# Patient Record
Sex: FEMALE | Race: WHITE | Employment: OTHER | URBAN - METROPOLITAN AREA
[De-identification: names, ages, dates, MRNs, and addresses within clinical notes are randomized per-mention and may not be internally consistent; named-entity substitution may affect disease eponyms.]

---

## 2019-04-17 ENCOUNTER — APPOINTMENT (OUTPATIENT)
Dept: GENERAL RADIOLOGY | Facility: CLINIC | Age: 84
DRG: 981 | End: 2019-04-17
Attending: EMERGENCY MEDICINE
Payer: MEDICARE

## 2019-04-17 ENCOUNTER — APPOINTMENT (OUTPATIENT)
Dept: CT IMAGING | Facility: CLINIC | Age: 84
DRG: 981 | End: 2019-04-17
Attending: EMERGENCY MEDICINE
Payer: MEDICARE

## 2019-04-17 ENCOUNTER — APPOINTMENT (OUTPATIENT)
Dept: GENERAL RADIOLOGY | Facility: CLINIC | Age: 84
DRG: 981 | End: 2019-04-17
Attending: PHYSICIAN ASSISTANT
Payer: MEDICARE

## 2019-04-17 ENCOUNTER — HOSPITAL ENCOUNTER (INPATIENT)
Facility: CLINIC | Age: 84
LOS: 13 days | Discharge: HOSPICE | DRG: 981 | End: 2019-04-30
Attending: EMERGENCY MEDICINE | Admitting: INTERNAL MEDICINE
Payer: MEDICARE

## 2019-04-17 ENCOUNTER — DOCUMENTATION ONLY (OUTPATIENT)
Dept: OTHER | Facility: CLINIC | Age: 84
End: 2019-04-17

## 2019-04-17 ENCOUNTER — APPOINTMENT (OUTPATIENT)
Dept: CARDIOLOGY | Facility: CLINIC | Age: 84
DRG: 981 | End: 2019-04-17
Attending: INTERNAL MEDICINE
Payer: MEDICARE

## 2019-04-17 ENCOUNTER — NURSE TRIAGE (OUTPATIENT)
Dept: NURSING | Facility: CLINIC | Age: 84
End: 2019-04-17

## 2019-04-17 DIAGNOSIS — I63.9 CEREBROVASCULAR ACCIDENT (CVA), UNSPECIFIED MECHANISM (H): ICD-10-CM

## 2019-04-17 DIAGNOSIS — I63.9 ACUTE CVA (CEREBROVASCULAR ACCIDENT) (H): Primary | ICD-10-CM

## 2019-04-17 LAB
ABO + RH BLD: NORMAL
ABO + RH BLD: NORMAL
ALBUMIN SERPL-MCNC: 2.5 G/DL (ref 3.4–5)
ALBUMIN SERPL-MCNC: 3 G/DL (ref 3.4–5)
ALBUMIN UR-MCNC: 30 MG/DL
ALP SERPL-CCNC: 122 U/L (ref 40–150)
ALP SERPL-CCNC: 128 U/L (ref 40–150)
ALT SERPL W P-5'-P-CCNC: 29 U/L (ref 0–50)
ALT SERPL W P-5'-P-CCNC: 30 U/L (ref 0–50)
ANION GAP SERPL CALCULATED.3IONS-SCNC: 11 MMOL/L (ref 3–14)
APPEARANCE UR: CLEAR
APTT PPP: 35 SEC (ref 22–37)
AST SERPL W P-5'-P-CCNC: 23 U/L (ref 0–45)
AST SERPL W P-5'-P-CCNC: 26 U/L (ref 0–45)
BASOPHILS # BLD AUTO: 0 10E9/L (ref 0–0.2)
BASOPHILS NFR BLD AUTO: 0.1 %
BILIRUB DIRECT SERPL-MCNC: 0.3 MG/DL (ref 0–0.2)
BILIRUB DIRECT SERPL-MCNC: 0.4 MG/DL (ref 0–0.2)
BILIRUB SERPL-MCNC: 0.8 MG/DL (ref 0.2–1.3)
BILIRUB SERPL-MCNC: 0.8 MG/DL (ref 0.2–1.3)
BILIRUB UR QL STRIP: NEGATIVE
BLD GP AB SCN SERPL QL: NORMAL
BLOOD BANK CMNT PATIENT-IMP: NORMAL
BUN SERPL-MCNC: 25 MG/DL (ref 7–30)
CALCIUM SERPL-MCNC: 9.7 MG/DL (ref 8.5–10.1)
CHLORIDE SERPL-SCNC: 96 MMOL/L (ref 94–109)
CHOLEST SERPL-MCNC: 126 MG/DL
CO2 SERPL-SCNC: 22 MMOL/L (ref 20–32)
COLOR UR AUTO: YELLOW
CREAT SERPL-MCNC: 0.84 MG/DL (ref 0.52–1.04)
CRP SERPL-MCNC: 231 MG/L (ref 0–8)
DIFFERENTIAL METHOD BLD: ABNORMAL
EOSINOPHIL # BLD AUTO: 0 10E9/L (ref 0–0.7)
EOSINOPHIL NFR BLD AUTO: 0 %
ERYTHROCYTE [DISTWIDTH] IN BLOOD BY AUTOMATED COUNT: 14.1 % (ref 10–15)
FLUAV+FLUBV AG SPEC QL: NEGATIVE
FLUAV+FLUBV AG SPEC QL: NEGATIVE
GFR SERPL CREATININE-BSD FRML MDRD: 61 ML/MIN/{1.73_M2}
GLUCOSE BLDC GLUCOMTR-MCNC: 127 MG/DL (ref 70–99)
GLUCOSE SERPL-MCNC: 136 MG/DL (ref 70–99)
GLUCOSE UR STRIP-MCNC: NEGATIVE MG/DL
HBA1C MFR BLD: 6.1 % (ref 0–5.6)
HCT VFR BLD AUTO: 31.6 % (ref 35–47)
HDLC SERPL-MCNC: 22 MG/DL
HGB BLD-MCNC: 11.4 G/DL (ref 11.7–15.7)
HGB UR QL STRIP: ABNORMAL
IMM GRANULOCYTES # BLD: 0 10E9/L (ref 0–0.4)
IMM GRANULOCYTES NFR BLD: 0.3 %
INR PPP: 1.2 (ref 0.86–1.14)
INTERPRETATION ECG - MUSE: NORMAL
KETONES UR STRIP-MCNC: NEGATIVE MG/DL
LACTATE BLD-SCNC: 1.1 MMOL/L (ref 0.7–2)
LACTATE BLD-SCNC: 1.3 MMOL/L (ref 0.7–2)
LDLC SERPL CALC-MCNC: 81 MG/DL
LEUKOCYTE ESTERASE UR QL STRIP: NEGATIVE
LIPASE SERPL-CCNC: 77 U/L (ref 73–393)
LYMPHOCYTES # BLD AUTO: 0.3 10E9/L (ref 0.8–5.3)
LYMPHOCYTES NFR BLD AUTO: 1.8 %
MCH RBC QN AUTO: 31.2 PG (ref 26.5–33)
MCHC RBC AUTO-ENTMCNC: 36.1 G/DL (ref 31.5–36.5)
MCV RBC AUTO: 87 FL (ref 78–100)
MONOCYTES # BLD AUTO: 0.5 10E9/L (ref 0–1.3)
MONOCYTES NFR BLD AUTO: 3.1 %
NEUTROPHILS # BLD AUTO: 13.9 10E9/L (ref 1.6–8.3)
NEUTROPHILS NFR BLD AUTO: 94.7 %
NITRATE UR QL: NEGATIVE
NONHDLC SERPL-MCNC: 104 MG/DL
NRBC # BLD AUTO: 0 10*3/UL
NRBC BLD AUTO-RTO: 0 /100
PH UR STRIP: 6 PH (ref 5–7)
PLATELET # BLD AUTO: 199 10E9/L (ref 150–450)
POTASSIUM SERPL-SCNC: 3.9 MMOL/L (ref 3.4–5.3)
PROCALCITONIN SERPL-MCNC: 0.61 NG/ML
PROT SERPL-MCNC: 6.4 G/DL (ref 6.8–8.8)
PROT SERPL-MCNC: 7.4 G/DL (ref 6.8–8.8)
RADIOLOGIST FLAGS: ABNORMAL
RBC # BLD AUTO: 3.65 10E12/L (ref 3.8–5.2)
RBC #/AREA URNS AUTO: 1 /HPF (ref 0–2)
SODIUM SERPL-SCNC: 129 MMOL/L (ref 133–144)
SOURCE: ABNORMAL
SP GR UR STRIP: 1.02 (ref 1–1.03)
SPECIMEN EXP DATE BLD: NORMAL
SPECIMEN SOURCE: NORMAL
TRIGL SERPL-MCNC: 117 MG/DL
UROBILINOGEN UR STRIP-MCNC: NORMAL MG/DL (ref 0–2)
WBC # BLD AUTO: 14.7 10E9/L (ref 4–11)
WBC #/AREA URNS AUTO: 1 /HPF (ref 0–5)

## 2019-04-17 PROCEDURE — 85610 PROTHROMBIN TIME: CPT | Performed by: EMERGENCY MEDICINE

## 2019-04-17 PROCEDURE — 86140 C-REACTIVE PROTEIN: CPT | Performed by: INTERNAL MEDICINE

## 2019-04-17 PROCEDURE — 85025 COMPLETE CBC W/AUTO DIFF WBC: CPT | Performed by: EMERGENCY MEDICINE

## 2019-04-17 PROCEDURE — 81001 URINALYSIS AUTO W/SCOPE: CPT | Performed by: EMERGENCY MEDICINE

## 2019-04-17 PROCEDURE — 71046 X-RAY EXAM CHEST 2 VIEWS: CPT

## 2019-04-17 PROCEDURE — 93306 TTE W/DOPPLER COMPLETE: CPT | Mod: 26 | Performed by: INTERNAL MEDICINE

## 2019-04-17 PROCEDURE — 40000264 ECHOCARDIOGRAM COMPLETE

## 2019-04-17 PROCEDURE — 87800 DETECT AGNT MULT DNA DIREC: CPT | Performed by: EMERGENCY MEDICINE

## 2019-04-17 PROCEDURE — 00000146 ZZHCL STATISTIC GLUCOSE BY METER IP

## 2019-04-17 PROCEDURE — 36415 COLL VENOUS BLD VENIPUNCTURE: CPT

## 2019-04-17 PROCEDURE — 87040 BLOOD CULTURE FOR BACTERIA: CPT | Performed by: EMERGENCY MEDICINE

## 2019-04-17 PROCEDURE — 25000132 ZZH RX MED GY IP 250 OP 250 PS 637: Performed by: EMERGENCY MEDICINE

## 2019-04-17 PROCEDURE — 25800030 ZZH RX IP 258 OP 636: Performed by: PHYSICIAN ASSISTANT

## 2019-04-17 PROCEDURE — 80076 HEPATIC FUNCTION PANEL: CPT | Performed by: EMERGENCY MEDICINE

## 2019-04-17 PROCEDURE — 70450 CT HEAD/BRAIN W/O DYE: CPT

## 2019-04-17 PROCEDURE — 80048 BASIC METABOLIC PNL TOTAL CA: CPT | Performed by: EMERGENCY MEDICINE

## 2019-04-17 PROCEDURE — 87077 CULTURE AEROBIC IDENTIFY: CPT | Performed by: PHYSICIAN ASSISTANT

## 2019-04-17 PROCEDURE — 80061 LIPID PANEL: CPT | Performed by: INTERNAL MEDICINE

## 2019-04-17 PROCEDURE — 12000000 ZZH R&B MED SURG/OB

## 2019-04-17 PROCEDURE — 86900 BLOOD TYPING SEROLOGIC ABO: CPT | Performed by: EMERGENCY MEDICINE

## 2019-04-17 PROCEDURE — 96360 HYDRATION IV INFUSION INIT: CPT | Mod: 59

## 2019-04-17 PROCEDURE — 70498 CT ANGIOGRAPHY NECK: CPT

## 2019-04-17 PROCEDURE — 36415 COLL VENOUS BLD VENIPUNCTURE: CPT | Performed by: PHYSICIAN ASSISTANT

## 2019-04-17 PROCEDURE — 99285 EMERGENCY DEPT VISIT HI MDM: CPT | Mod: 25

## 2019-04-17 PROCEDURE — 87040 BLOOD CULTURE FOR BACTERIA: CPT | Performed by: PHYSICIAN ASSISTANT

## 2019-04-17 PROCEDURE — 87804 INFLUENZA ASSAY W/OPTIC: CPT | Performed by: EMERGENCY MEDICINE

## 2019-04-17 PROCEDURE — 86850 RBC ANTIBODY SCREEN: CPT | Performed by: EMERGENCY MEDICINE

## 2019-04-17 PROCEDURE — 87186 SC STD MICRODIL/AGAR DIL: CPT | Performed by: EMERGENCY MEDICINE

## 2019-04-17 PROCEDURE — 84145 PROCALCITONIN (PCT): CPT | Performed by: INTERNAL MEDICINE

## 2019-04-17 PROCEDURE — 25000128 H RX IP 250 OP 636: Performed by: EMERGENCY MEDICINE

## 2019-04-17 PROCEDURE — 25800030 ZZH RX IP 258 OP 636: Performed by: INTERNAL MEDICINE

## 2019-04-17 PROCEDURE — 73502 X-RAY EXAM HIP UNI 2-3 VIEWS: CPT

## 2019-04-17 PROCEDURE — 74176 CT ABD & PELVIS W/O CONTRAST: CPT

## 2019-04-17 PROCEDURE — 99223 1ST HOSP IP/OBS HIGH 75: CPT | Mod: GC | Performed by: PSYCHIATRY & NEUROLOGY

## 2019-04-17 PROCEDURE — 25000132 ZZH RX MED GY IP 250 OP 250 PS 637: Performed by: PHYSICIAN ASSISTANT

## 2019-04-17 PROCEDURE — 83605 ASSAY OF LACTIC ACID: CPT | Performed by: EMERGENCY MEDICINE

## 2019-04-17 PROCEDURE — 83036 HEMOGLOBIN GLYCOSYLATED A1C: CPT | Performed by: INTERNAL MEDICINE

## 2019-04-17 PROCEDURE — 25000125 ZZHC RX 250: Performed by: EMERGENCY MEDICINE

## 2019-04-17 PROCEDURE — 80076 HEPATIC FUNCTION PANEL: CPT | Performed by: INTERNAL MEDICINE

## 2019-04-17 PROCEDURE — 93005 ELECTROCARDIOGRAM TRACING: CPT

## 2019-04-17 PROCEDURE — 99223 1ST HOSP IP/OBS HIGH 75: CPT | Mod: AI | Performed by: INTERNAL MEDICINE

## 2019-04-17 PROCEDURE — 25000128 H RX IP 250 OP 636: Performed by: PHYSICIAN ASSISTANT

## 2019-04-17 PROCEDURE — A9270 NON-COVERED ITEM OR SERVICE: HCPCS | Performed by: EMERGENCY MEDICINE

## 2019-04-17 PROCEDURE — 83605 ASSAY OF LACTIC ACID: CPT | Performed by: INTERNAL MEDICINE

## 2019-04-17 PROCEDURE — A9270 NON-COVERED ITEM OR SERVICE: HCPCS | Performed by: PHYSICIAN ASSISTANT

## 2019-04-17 PROCEDURE — 96361 HYDRATE IV INFUSION ADD-ON: CPT

## 2019-04-17 PROCEDURE — 36415 COLL VENOUS BLD VENIPUNCTURE: CPT | Performed by: INTERNAL MEDICINE

## 2019-04-17 PROCEDURE — 25000128 H RX IP 250 OP 636: Performed by: INTERNAL MEDICINE

## 2019-04-17 PROCEDURE — 83690 ASSAY OF LIPASE: CPT | Performed by: EMERGENCY MEDICINE

## 2019-04-17 PROCEDURE — 86901 BLOOD TYPING SEROLOGIC RH(D): CPT | Performed by: EMERGENCY MEDICINE

## 2019-04-17 PROCEDURE — 73562 X-RAY EXAM OF KNEE 3: CPT | Mod: RT

## 2019-04-17 PROCEDURE — 87077 CULTURE AEROBIC IDENTIFY: CPT | Performed by: EMERGENCY MEDICINE

## 2019-04-17 PROCEDURE — 85730 THROMBOPLASTIN TIME PARTIAL: CPT | Performed by: EMERGENCY MEDICINE

## 2019-04-17 RX ORDER — NALOXONE HYDROCHLORIDE 0.4 MG/ML
.1-.4 INJECTION, SOLUTION INTRAMUSCULAR; INTRAVENOUS; SUBCUTANEOUS
Status: DISCONTINUED | OUTPATIENT
Start: 2019-04-17 | End: 2019-04-18

## 2019-04-17 RX ORDER — ATORVASTATIN CALCIUM 40 MG/1
40 TABLET, FILM COATED ORAL
Status: DISCONTINUED | OUTPATIENT
Start: 2019-04-17 | End: 2019-04-19

## 2019-04-17 RX ORDER — ACETAMINOPHEN 650 MG/1
650 SUPPOSITORY RECTAL EVERY 4 HOURS PRN
Status: DISCONTINUED | OUTPATIENT
Start: 2019-04-17 | End: 2019-04-25

## 2019-04-17 RX ORDER — LORAZEPAM 2 MG/ML
.5-1 INJECTION INTRAMUSCULAR ONCE
Status: COMPLETED | OUTPATIENT
Start: 2019-04-18 | End: 2019-04-18

## 2019-04-17 RX ORDER — LORAZEPAM 2 MG/ML
0.5 INJECTION INTRAMUSCULAR
Status: DISCONTINUED | OUTPATIENT
Start: 2019-04-17 | End: 2019-04-24

## 2019-04-17 RX ORDER — ONDANSETRON 2 MG/ML
4 INJECTION INTRAMUSCULAR; INTRAVENOUS EVERY 6 HOURS PRN
Status: DISCONTINUED | OUTPATIENT
Start: 2019-04-17 | End: 2019-04-25

## 2019-04-17 RX ORDER — POTASSIUM CHLORIDE 29.8 MG/ML
20 INJECTION INTRAVENOUS
Status: DISCONTINUED | OUTPATIENT
Start: 2019-04-17 | End: 2019-04-18

## 2019-04-17 RX ORDER — HYDROCHLOROTHIAZIDE 12.5 MG/1
12.5 TABLET ORAL DAILY
Status: ON HOLD | COMMUNITY
End: 2019-04-29

## 2019-04-17 RX ORDER — AMLODIPINE BESYLATE 5 MG/1
5 TABLET ORAL DAILY
Status: ON HOLD | COMMUNITY
End: 2019-04-29

## 2019-04-17 RX ORDER — POTASSIUM CHLORIDE 1500 MG/1
20-40 TABLET, EXTENDED RELEASE ORAL
Status: DISCONTINUED | OUTPATIENT
Start: 2019-04-17 | End: 2019-04-18

## 2019-04-17 RX ORDER — POTASSIUM CHLORIDE 7.45 MG/ML
10 INJECTION INTRAVENOUS
Status: DISCONTINUED | OUTPATIENT
Start: 2019-04-17 | End: 2019-04-18

## 2019-04-17 RX ORDER — POTASSIUM CHLORIDE 1.5 G/1.58G
20-40 POWDER, FOR SOLUTION ORAL
Status: DISCONTINUED | OUTPATIENT
Start: 2019-04-17 | End: 2019-04-18

## 2019-04-17 RX ORDER — HYDROMORPHONE HYDROCHLORIDE 1 MG/ML
0.2 INJECTION, SOLUTION INTRAMUSCULAR; INTRAVENOUS; SUBCUTANEOUS
Status: DISCONTINUED | OUTPATIENT
Start: 2019-04-17 | End: 2019-04-18

## 2019-04-17 RX ORDER — LORAZEPAM 2 MG/ML
.5-1 INJECTION INTRAMUSCULAR ONCE
Status: DISCONTINUED | OUTPATIENT
Start: 2019-04-17 | End: 2019-04-17

## 2019-04-17 RX ORDER — IOPAMIDOL 755 MG/ML
120 INJECTION, SOLUTION INTRAVASCULAR ONCE
Status: COMPLETED | OUTPATIENT
Start: 2019-04-17 | End: 2019-04-17

## 2019-04-17 RX ORDER — ASPIRIN 300 MG/1
300 SUPPOSITORY RECTAL ONCE
Status: COMPLETED | OUTPATIENT
Start: 2019-04-17 | End: 2019-04-17

## 2019-04-17 RX ORDER — ONDANSETRON 4 MG/1
4 TABLET, ORALLY DISINTEGRATING ORAL EVERY 6 HOURS PRN
Status: DISCONTINUED | OUTPATIENT
Start: 2019-04-17 | End: 2019-04-25

## 2019-04-17 RX ORDER — PIPERACILLIN SODIUM, TAZOBACTAM SODIUM 3; .375 G/15ML; G/15ML
3.38 INJECTION, POWDER, LYOPHILIZED, FOR SOLUTION INTRAVENOUS EVERY 6 HOURS
Status: DISCONTINUED | OUTPATIENT
Start: 2019-04-17 | End: 2019-04-17

## 2019-04-17 RX ORDER — POTASSIUM CL/LIDO/0.9 % NACL 10MEQ/0.1L
10 INTRAVENOUS SOLUTION, PIGGYBACK (ML) INTRAVENOUS
Status: DISCONTINUED | OUTPATIENT
Start: 2019-04-17 | End: 2019-04-18

## 2019-04-17 RX ORDER — ATORVASTATIN CALCIUM 10 MG/1
10 TABLET, FILM COATED ORAL DAILY
Status: ON HOLD | COMMUNITY
End: 2019-04-29

## 2019-04-17 RX ORDER — LABETALOL 20 MG/4 ML (5 MG/ML) INTRAVENOUS SYRINGE
10-40 EVERY 10 MIN PRN
Status: DISCONTINUED | OUTPATIENT
Start: 2019-04-17 | End: 2019-04-30 | Stop reason: HOSPADM

## 2019-04-17 RX ORDER — ASPIRIN 81 MG/1
81 TABLET ORAL DAILY
Status: ON HOLD | COMMUNITY
End: 2019-04-29

## 2019-04-17 RX ORDER — ACETAMINOPHEN 325 MG/1
650 TABLET ORAL EVERY 4 HOURS PRN
Status: DISCONTINUED | OUTPATIENT
Start: 2019-04-17 | End: 2019-04-18

## 2019-04-17 RX ORDER — AMOXICILLIN 250 MG
1 CAPSULE ORAL 2 TIMES DAILY PRN
Status: DISCONTINUED | OUTPATIENT
Start: 2019-04-17 | End: 2019-04-30 | Stop reason: HOSPADM

## 2019-04-17 RX ORDER — LOSARTAN POTASSIUM 100 MG/1
100 TABLET ORAL DAILY
Status: ON HOLD | COMMUNITY
End: 2019-04-29

## 2019-04-17 RX ORDER — AMOXICILLIN 250 MG
2 CAPSULE ORAL 2 TIMES DAILY PRN
Status: DISCONTINUED | OUTPATIENT
Start: 2019-04-17 | End: 2019-04-30 | Stop reason: HOSPADM

## 2019-04-17 RX ORDER — ASPIRIN 300 MG/1
300 SUPPOSITORY RECTAL DAILY
Status: DISCONTINUED | OUTPATIENT
Start: 2019-04-18 | End: 2019-04-25

## 2019-04-17 RX ORDER — SODIUM CHLORIDE 9 MG/ML
INJECTION, SOLUTION INTRAVENOUS CONTINUOUS
Status: DISCONTINUED | OUTPATIENT
Start: 2019-04-17 | End: 2019-04-20

## 2019-04-17 RX ORDER — CEFTRIAXONE 1 G/1
1 INJECTION, POWDER, FOR SOLUTION INTRAMUSCULAR; INTRAVENOUS EVERY 24 HOURS
Status: DISCONTINUED | OUTPATIENT
Start: 2019-04-17 | End: 2019-04-18

## 2019-04-17 RX ADMIN — HYDROMORPHONE HYDROCHLORIDE 0.2 MG: 1 INJECTION, SOLUTION INTRAMUSCULAR; INTRAVENOUS; SUBCUTANEOUS at 20:37

## 2019-04-17 RX ADMIN — VANCOMYCIN HYDROCHLORIDE 1500 MG: 5 INJECTION, POWDER, LYOPHILIZED, FOR SOLUTION INTRAVENOUS at 23:27

## 2019-04-17 RX ADMIN — SODIUM CHLORIDE: 9 INJECTION, SOLUTION INTRAVENOUS at 23:27

## 2019-04-17 RX ADMIN — SODIUM CHLORIDE 100 ML: 9 INJECTION, SOLUTION INTRAVENOUS at 08:38

## 2019-04-17 RX ADMIN — HYDROMORPHONE HYDROCHLORIDE 0.2 MG: 1 INJECTION, SOLUTION INTRAMUSCULAR; INTRAVENOUS; SUBCUTANEOUS at 23:35

## 2019-04-17 RX ADMIN — SODIUM CHLORIDE: 9 INJECTION, SOLUTION INTRAVENOUS at 14:17

## 2019-04-17 RX ADMIN — SODIUM CHLORIDE 1000 ML: 9 INJECTION, SOLUTION INTRAVENOUS at 09:33

## 2019-04-17 RX ADMIN — ACETAMINOPHEN 650 MG: 650 SUPPOSITORY RECTAL at 19:14

## 2019-04-17 RX ADMIN — ASPIRIN 300 MG: 300 SUPPOSITORY RECTAL at 09:30

## 2019-04-17 RX ADMIN — IOPAMIDOL 70 ML: 755 INJECTION, SOLUTION INTRAVENOUS at 08:38

## 2019-04-17 ASSESSMENT — ACTIVITIES OF DAILY LIVING (ADL)
ADLS_ACUITY_SCORE: 16
ADLS_ACUITY_SCORE: 16

## 2019-04-17 ASSESSMENT — MIFFLIN-ST. JEOR: SCORE: 1199.5

## 2019-04-17 NOTE — H&P
Admitted:     04/17/2019      PRIMARY CARE PROVIDER:  Out of town.      CHIEF COMPLAINT:  Change in mental state, aphasia.      HISTORY OF PRESENT ILLNESS:  Ms. is a 89-year-old female with a known history of hypertension, TIA, hyperlipidemia, and recurrent UTIs, who was brought to the Emergency Room by her daughter with a change in mental state and aphasia.  Reportedly, per the daughter, the patient woke up around 3:00 a.m. today and appeared to be confused, was having word finding difficulty and also difficulty following commands.  It appeared like she was aphasic and had a jumbled response to speech.  She also appeared a little confused and disoriented.  Reportedly, she was unable to see well on the left side.  The patient apparently slept after that on a recliner and when she woke up this morning, she was still having ongoing problems.  The daughter drove her to the Emergency Room.  At the time of my evaluation, the patient's speech and confusion appear to be much improved, and she is participating somewhat better in the history taking.  The patient denies any chest pain, dyspnea, abdominal pain, nausea, or vomiting.  She denies dysuria, urinary urgency or frequency.  Reportedly, the patient had fever up to 100 degrees Fahrenheit yesterday and subsequently 101 degrees today.  She also recently had influenza A about a month ago when she was in [place] and was treated.  She has had a lingering cough since then.      The patient also has been having an ongoing problem with her left hip.  She has had left hip arthroplasty there with some ongoing pain, but the pain has gotten worse in the past 3 days.  She had a right knee steroid injection performed by [facility] yesterday for ongoing pain of her right knee.  Reportedly, she was also evaluated for her left hip pain at that time and had an x-ray of the pelvis with left hip done, which was reported as lucencies seen along the medial aspect of the femoral  components proximally, concerning for osteolysis.  Given the findings on the x-ray, she apparently was referred for outpatient evaluation with an orthopedic surgeon for the left hip.      In the Emergency Room, the patient was evaluated by Dr. Sierra Addison.  A code stroke was called.  A CT head revealed a 2-3 cm area of hypoattenuation in gray white differentiation loss involving the mid right postcentral gyrus, extending into the right parietal white matter.  This is suspicious for an area of acute/subacute stroke ischemia.  The patient is admitted with concerns for acute stroke.  She was out of the window for TPA as she woke up with the symptoms.      PAST MEDICAL HISTORY:   1.  Transient ischemic attack.   2.  Hypertension.   3.  Hyperlipidemia.   4.  Hyponatremia.   5.  Benign essential hypertension.   6.  Recurrent UTIs.      PAST SURGICAL HISTORY:  She has had bilateral hip replacement done.      SOCIAL AND PERSONAL HISTORY:  She lives in [place] during the summer months and in [place] during the winter months.  She is visiting her mom at the [facility] at the moment.  Denies tobacco use, drinks alcohol socially.  No recreational drug use.      FAMILY HISTORY:  Reviewed and is noncontributory.      HOME MEDICATIONS:     Medications Prior to Admission   Medication Sig Dispense Refill Last Dose     amLODIPine (NORVASC) 5 MG tablet Take 5 mg by mouth daily   4/14/2019 at am     aspirin 81 MG EC tablet Take 81 mg by mouth daily   4/14/2019 at am     atorvastatin (LIPITOR) 10 MG tablet Take 10 mg by mouth daily   4/14/2019 at am     hydrochlorothiazide (HYDRODIURIL) 12.5 MG tablet Take 12.5 mg by mouth daily   4/14/2019 at am     losartan (COZAAR) 100 MG tablet Take 100 mg by mouth daily   4/14/2019 at am        REVIEW OF SYSTEMS:  A complete review of system was done and was negative for anything else other than that mentioned in the HPI.      PHYSICAL EXAMINATION:   GENERAL:  Ms. Belgica Klein is a 89-year-old  female.  She is not in any overt distress.  She does appear to be slightly anxious.   VITAL SIGNS:  Temperature 98.8, blood pressure 148/83, heart rate is 114, respiration rate is 13, O2 sat 94% on room air.   HEENT:  Atraumatic, normocephalic, no pallor or icterus.   NECK:  Supple, with good range of motion.   RESPIRATORY:  Decreased air entry at the base.  Normal effort of breathing.  No crackles or wheezing.   CARDIOVASCULAR:  Tachycardic, no murmur.   ABDOMEN:  Mildly distended but is soft, nontender.  No guarding, rebound, or rigidity.  Bowel sounds normoactive.   EXTREMITIES:  There is no edema, cyanosis or clubbing.   MUSCULOSKELETAL:  There is no obvious joint swelling, erythema or deformity in any of the joints, but she has a tender area on the lateral aspect of the left hip with restricted range of motion.   SKIN:  Warm and dry.   NEUROLOGIC:  She is awake, alert and answering most of my questions appropriately at the moment.  Cranial nerves II-XII are grossly intact except for maybe mild expressive aphasia and possible left visual field cut.  Motor strength appears to be intact, although it is difficult to quantify motor strength in the left upper extremity as she is hesitant to move around because of the left hip pain.      LABORATORY AND DIAGNOSTIC DATA:  Sodium is 129.  Lactic acid is normal.  White cell count is 14.7.  UA does not show any clear evidence of UTI.  Influenza screen is negative.  X-ray of the chest shows left pleural effusion.  There is moderate diffuse vascular prominence suggesting vascular congestion.  There are mild atelectases in the left lung base.  CT abdomen and pelvis without contrast does not show any acute findings.  There is a 0.3 cm noncalcified nodule in the posterior left lung base.  Recommendations are optional followup at 12 months.  CT head reveals a 2-3 cm area of hypoattenuation and gray white differentiation loss involving the mid right postcentral gyrus, extending  into the right parietal white matter.  This is suspicious for area of acute/subacute ischemia.  A 12-lead EKG shows sinus tachycardia.  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  CT of the neck reveals severe atherosclerotic plaquing of the bilateral carotid bifurcation with approximately 20-30% narrowing of the proximal right internal carotid artery and 40-50% narrowing of the proximal left internal carotid artery.      ASSESSMENT AND PLAN:  Ms. Belgica Klein is a 89-year-old female who presents to the Emergency Room with change in mental state and expressive aphasia.   1.  Acute to subacute ischemic cerebrovascular accident involving right postcentral gyrus and right parietal lobe.  The patient presents with expressive aphasia, some confusion and possibly left visual field cut.  CT head as findings mentioned above.  The patient will be admitted for management and evaluation of acute stroke.  MRI brain will be performed.  She has been evaluated by Neurology.  She is not a candidate for TPA.  We will do neuro checks q.2 hours.  PT, OT, and speech will be consulted.  Continue with aspirin 325 mg daily along with Lipitor 40 mg daily.  We will also perform an echocardiogram as a part of the evaluation of her stroke.     2.  Hyponatremia.  Her sodium today is 129.  The patient reportedly has had problems with low sodium in the recent past.  She was on hydrochlorothiazide and reportedly has been stopped for the past 3 days or so.  That could be one of the culprits.  We will continue to hold hydrochlorothiazide and monitor her sodium here in the hospital.   3.  Left hip pain.  The patient has had left arthroplasty about 30 years ago.  She has had ongoing pain, which has reportedly escalated in the last 3 days or so.  She did have an x-ray  done yesterday, which showed some abnormal findings but per the read could not establish the acuity of this finding.  The patient continues to have pain, also has some reported fever at home and leukocytosis.  Clinically, no obvious evidence of inflammation around that joint or any erythema.  However, due to ongoing pain and abnormal x-ray, we would like to get a second opinion from Orthopedics.  We will also check a CRP and procalcitonin.   4.  Reported fever and leukocytosis.  Again, no localizing source for this.  CT abdomen was unremarkable.  Flu swab was negative.  Moreover, she just had influenza a month ago and was treated for that.  Blood culture has been obtained.  We will await the results of that.  We will check a procalcitonin.  We will monitor fever profile.  Hold off on any antibiotics at this time.   5.  Benign essential hypertension.  This will be managed as per acute stroke blood pressure management guidelines.  We will resume her prior to admission amlodipine and losartan when it is clinically appropriate.   6.  Hyperlipidemia.  Continue Lipitor 40 mg daily.      CODE STATUS:  This was discussed with the daughter who was at the bedside, and she has power of ; patient is DNR/DNI.         AIXA MEADOWS MD             D: 2019   T: 2019   MT: SABINO      Name:     RAJANI WILD   MRN:      [id number]        Account:      [id number]   :      1928        Admitted:     2019                   Document: [id number]       cc: Primary Care Physician

## 2019-04-17 NOTE — ED PROVIDER NOTES
"  History     Chief Complaint:    Altered Mental Status    History limited due to altered mental status. History provided by the patient's daughter.       DIVYA Klein is a 89 year old female with a history of TIA, hypertension, carotid artery stenosis, amongst others, who presents with altered mental status. The patient's daughter reports that recently the patient has been experiencing right knee pain and left hip pain of which there is suspicion for bursitis and yesterday had a cortisone injection at [facility]. She was not discharged with any medications but has been taking Tylenol to combat. Over the past few days, she also has had a decreased ability to ambulate due to the pain and usually is able to ambulate with a wright. Last night, the patient went to bed around 2100 at her baseline but in pain and the patient's daughter slept next to her. Around 0200, the patient woke up and then around 0300 she appeared confused to the patient's daughter and was unable to follow commands detailing that when she was going to the bathroom she was asking where to go, where the toilet was when it was in front of her, and \"what to do you want me to do\", which is unusual for her. She continues to detail that the patient was also having difficulty with her speech and her words were not making sense. The patient's daughter also annotates that her abdomen appears more distended than usual. She denies any vomiting, numbness or tingling. The patient's daughter recalls that the patient has a history of hyponatremia.     Allergies:  Penicillins; hives       Medications:    Irbesartan   Norvasc  Hydrodiuril     Past Medical History:    Hypertension   TIA  Bilateral carotid artery stenosis  Cystitis   Expressive aphasia   UTI    Past Surgical History:    Joint replacements     Family History:    Family history reviewed. No pertinent family history.     Social History:  The patient was accompanied to the ED by daughter.  Smoking Status: " "Never Smoker  Smokeless Tobacco: Never Used  Alcohol Use: Positiv  Drug Use: Negative     Review of Systems   Unable to perform ROS: Mental status change     Physical Exam     Patient Vitals for the past 24 hrs:   BP Temp Temp src Pulse Heart Rate Resp SpO2 Height Weight   04/17/19 1139 (!) 146/111 -- -- 115 117 22 93 % -- --   04/17/19 1130 (!) 146/101 -- -- 122 120 17 93 % -- --   04/17/19 1115 -- -- -- -- 123 16 95 % -- --   04/17/19 1100 148/83 -- -- 114 113 13 94 % -- --   04/17/19 1045 -- -- -- -- 118 9 94 % -- --   04/17/19 1030 142/87 -- -- 117 112 18 91 % -- --   04/17/19 1015 158/83 -- -- 113 115 17 95 % -- --   04/17/19 1000 160/87 -- -- -- -- -- -- -- --   04/17/19 0930 161/66 -- -- 113 115 (!) 74 96 % -- --   04/17/19 0915 (!) 165/93 -- -- 110 110 20 96 % -- --   04/17/19 0909 153/81 -- -- 111 108 19 95 % -- --   04/17/19 0900 -- -- -- -- 110 27 96 % -- --   04/17/19 0844 123/87 -- -- 109 -- -- -- -- --   04/17/19 0810 -- -- -- -- -- -- -- -- 73.1 kg (161 lb 2.5 oz)   04/17/19 0801 165/86 98.8  F (37.1  C) Tympanic 100 100 20 99 % 1.727 m (5' 8\") --      Physical Exam    General: Restless, appears to be uncomfortable at times  Eyes:  The pupils are equal and round    Conjunctivae and sclerae are normal  ENT:    No head trauma  Neck:  Normal range of motion  CV:  Tachycardic rate and rhythm    Skin warm and well perfused   Resp:  Lungs are clear    Non-labored    No rales    No wheezing   GI:  Abdomen is soft, there is no rigidity    Mild abdominal distention    No rebound tenderness     Mild diffuse abdominal tenderness  MS:  Normal muscular tone  Skin:  No rash or acute skin lesions noted  Psych: Normal affect.  Appropriate interactions.  NEURO: Awake, alert    Speech is clear but has intermittent expressive aphasia    Face is symmetric    EOMI    Moves all extremities and appears symmetric    Has difficulty with following commands    Equal sensation bilaterally on UE/LE and face    Seems to have left " sided visual field deficit/neglect    Emergency Department Course     ECG:  ECG taken at 0852, ECG read at 0911  Sinus tachycardia with frequent premature ventricular complexes  Possible left atrial enlargement  Left bundle brnach block  Abnormal ECG  Rate 15 bpm. IN interval 166 ms. QRS duration 130 ms. QT/QTc 352/486 ms. P-R-T axes 61 -19 112.    Imaging:  Radiology findings were communicated with the patient's daughter who voiced understanding of the findings.    XR Chest 2 Views  The heart size is normal. There is a small left pleural  effusion. No right effusion or pneumothorax is seen. There are  surgical changes of the right shoulder with advanced degenerative  changes of the left shoulder. There is moderate diffuse vascular  prominence suggesting vascular congestion. There may be mild  atelectasis in the left lung base. The lungs are otherwise clear. No  other abnormality is seen.   NAZARIO LEAVITT MD  Reading per radiology    XR Knee Right 3 Views  Degenerative changes as described above.   NAZARIO LEAVITT MD  Reading per radiology    CT Abdomen Pelvis w/o Contrast  1. No acute abnormality is seen. Specifically, no gastrointestinal  tract pathology is noted.   2. There is a 0.3 cm noncalcified nodule in the posterior left lung  base. Recommendations for one or multiple incidental lung nodules <  6mm :    Low risk patients: No routine follow-up.    High risk patients: Optional follow-up CT at 12 months; if  unchanged, no further follow-up.  *Low Risk: Minimal or absent history of smoking or other known risk  factors.  *Nonsolid (ground glass) or partly solid nodules may require longer  follow-up to exclude indolent adenocarcinoma.  *Recommendations based on Guidelines for the Management of Incidental  Pulmonary Nodules Detected at CT: From the Fleischner Society 2017,  Radiology 2017.  NAZARIO LEAVITT MD  Reading per radiology     CTA Head Neck with Contrast  Abnormal  CTA HEAD:  1. Patent  intracranial circulation.  2. 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  CTA NECK:  1. Severe atherosclerotic plaquing at the bilateral carotid  bifurcations with approximately 20-30% narrowing of the proximal right  internal carotid artery and approximately 40-50% narrowing of the  proximal left internal carotid artery.  2. Enhancing right thyroid nodule measuring up to 3.9 cm. Recommend  correlation with ultrasound-guided fine-needle aspiration.  [Access Center: Incidental thyroid nodule and aneurysm]  This report will be copied to the [facility] to ensure a  provider acknowledges the finding. Access Center is available Monday  through Friday 8am-3:30 pm.   BELINDA PÉREZ MD  Reading per radiology    CT Head w/o Contrast  A 2-3 cm area of hypoattenuation and gray-white  differentiation loss involving the mid right postcentral gyrus  extending into the right parietal white matter. This is suspicious for  area of acute/subacute ischemia. MRI could be performed. No evidence  of hemorrhage.  Findings were discussed by phone between Dr. Pérez and Dr. Addison at  8:40 AM on 4/17/2019.  BELINDA PÉREZ MD  Reading per radiology    Laboratory:  Laboratory findings were communicated with the patient's daughter who voiced understanding of the findings.    Blood Culture x2: Pending  Lactic Acid (Resulted: 1024): 1.3  Influenza A/B: Negative   UA: Blood small (A), Protein Albumin 30 (A), o/w WNL.  ABO/Rh type and screen: A pos, neg antibody   CBC: WBC 14.7 (H), HGB 11.4 (L),   BMP:  (L), Glucose 136 (H) o/w WNL (Creatinine 0.84)  INR: 1.20 (H)  Partial Thromboplastin Time: 25  Hepatic Panel: Bilirubin 0.4 (H), Albumin 3.0 (L), o/w WNL.  Lipase: 77    Interventions:  0930 Aspirin 300 mg rectal  0933 NS 1000 mL IV    Emergency Department Course:     Nursing notes and  "vitals reviewed.    0801 I performed an exam of the patient as documented above.     0805 IV was inserted and blood was drawn for laboratory testing, results above.     0815 Code stoke initiated.    0819 Stroke neurology team arrived in ED and I spoke with them prior to patient evaluation.     0840 I spoke with radiology regarding patient's imaging    0841 I spoke with stroke neurology team regarding patient's plan of care. Code stroke deescalated.     0900 The patient provided a urine sample here in the emergency department. This was sent for laboratory testing, findings above.     0933 A nasal swab sample was obtained for laboratory testing as documented above.    1001 Blood drawn. This was sent to the lab for further testing, results above.     1043 I spoke with Dr. Li of the hospitalist service from [facility] regarding patient's presentation, findings, and plan of care.     1047 I spoke with radiology regarding the patient's imaging.      I personally reviewed the imaging results with the patient's daughter and answered all related questions prior to admission.    Impression & Plan      Medical Decision Making:  Belgica Klein is a 89 year old female who presents to the emergency department today for evaluation of AMS. Concern for encephalopathy versus CVA. Code stroke called. Stroke team evaluated pt in ED. Unable to do CT perfusion due to movement but CT head shows likely infarct. Daughter thought she was having abdominal distention so did also do CT abdomen. This showed no acute abnormality. Labs with mild hyponatremia, leukocytosis. Daughter thought she had fever yesterday but none in ED. Workup for infection obtained but no clear bacterial infection found. Was having leg pain but was able to move her LE fairly well. She did say \"ouch\" with any movement of LE but not able to tell me where it hurt. Daughter thought it was left hip and right knee. Had xray pelvis yesterday at [facility]. No swelling or " erythema on joints to suggest septic arthritis. Stroke team recommended aspirin, MRI brain, admission. Outside of TPA window. No indication for neurointervention. Discussed with hospitalist for admission. After imaging resulted, final read did say she has thyroid nodule and incidental aneursym. Discussed with patient.     Diagnosis:    ICD-10-CM    1. Cerebrovascular accident (CVA), unspecified mechanism (H) I63.9 Lactic acid whole blood     ABO/Rh type and screen     Blood culture     Blood culture     Influenza A/B antigen     Disposition:   The patient is admitted into the care of Dr. Li.     CMS Diagnoses: The patient has stroke symptoms:           ED Stroke specific documentation           NIHSS PDF          Protocol PDF     Patient last known well time: 2130 4/16/19  ED Provider first to bedside at: 0801  CT Results received at: 0840  Patient was not treated with TPA due to the following reason(s):  Out of the treatment time window    National Institutes of Health Stroke Scale (Baseline)  Time Performed: 0801      Score    Level of consciousness: (0)   Alert, keenly responsive    LOC questions: (1)   Answers one question correctly    LOC commands: (1)   Performs one task correctly    Best gaze: (0)   Normal    Visual: (1)   Partial hemianopia    Facial palsy: (0)   Normal symmetrical movements    Motor arm (left): (0)   No drift    Motor arm (right): (0)   No drift    Motor leg (left): (0)   No drift    Motor leg (right): (0)   No drift    Limb ataxia: (0)   Absent    Sensory: (0)   Normal- no sensory loss    Best language: (1)   Mild to moderate aphasia    Dysarthria: (0)   Normal    Extinction and inattention: (0)   No abnormality        Total Score:  4        Stroke Mimics were considered (including migraine headache, seizure disorder, hypoglycemia (or hyperglycemia), head or spinal trauma, CNS infection, Toxin ingestion and shock state (e.g. sepsis) .    NIH Stroke Scale Post-CT and subsequent -  same as initial presentation    Scribe Disclosure:  I, Orla Severson, am serving as a scribe at 8:02 AM on 4/17/2019 to document services personally performed by Sierra Addison MD based on my observations and the provider's statements to me.      EMERGENCY DEPARTMENT       Sierra Addison MD  04/17/19 1151

## 2019-04-17 NOTE — PROGRESS NOTES
Radiology Note:  Patient was brought down to radiology suite for left  hip xray and aspiration. Patient had difficulty lying still and complying with instructions. Procedure was discussed with patient's daughter Karla, and it was determined that it would be difficult to maintain position and sterile field for the hip aspiration in the patient's current state. It was offered for the patient to come back tomorrow for hip aspiration with moderate sedation. The patient's daughter Karla would prefer to try tomorrow with sedation and states the patient would likely make the same choice.   Mari Bustamante PA-C

## 2019-04-17 NOTE — PHARMACY-ADMISSION MEDICATION HISTORY
Admission medication history interview status for the 4/17/2019  admission is complete. See EPIC admission navigator for prior to admission medications     Medication history source reliability:Good    Actions taken by pharmacist (provider contacted, etc): Spoke with patient and daughter. Daughter had a med list on her phone. Verified all medications with [facility] pharmacy     Additional medication history information not noted on PTA med list : Patient stopped taking her medications on Sunday 4/14/19 after her morning doses d/t not feeling well    Medication reconciliation/reorder completed by provider prior to medication history? No    Time spent in this activity: 20 min    Prior to Admission medications    Medication Sig Last Dose Taking? Auth Provider   amLODIPine (NORVASC) 5 MG tablet Take 5 mg by mouth daily 4/14/2019 at am Yes Unknown, Entered By History   aspirin 81 MG EC tablet Take 81 mg by mouth daily 4/14/2019 at am Yes Unknown, Entered By History   atorvastatin (LIPITOR) 10 MG tablet Take 10 mg by mouth daily 4/14/2019 at am Yes Unknown, Entered By History   hydrochlorothiazide (HYDRODIURIL) 12.5 MG tablet Take 12.5 mg by mouth daily 4/14/2019 at am Yes Unknown, Entered By History   losartan (COZAAR) 100 MG tablet Take 100 mg by mouth daily 4/14/2019 at am Yes Unknown, Entered By History

## 2019-04-17 NOTE — PLAN OF CARE
A&O to self only. Has difficulty finding her words. Went for aspiration this afternoon and was too restless to have this done. Rescheduled for tomorrow at 11am. MD paged and Ativan order placed for prior to procedure tomorrow. When patient returned to the unit she had a fever of 101.2. Recheck 100.1 then 98.5. MD updated. Tylenol suppository ordered. Patient c/o pain to bilateral hips. PRN Dilaudid ordered. Neuro difficult to assess due to patient confusion and uncooperative. Patient NPO due to inability to follow swallow eval instructions. . LS dim. Infrequent congested cough observed. Tele A-Fib. BS active, + Flatus. Abdomen distended but non-tender. PVR performed 204. Daughter at bedside for support. SW consult placed due to potential need for placement after hospital. Progressing toward plan of care.

## 2019-04-17 NOTE — ED NOTES
"[facility]  ED Nurse Handoff Report    ED Chief complaint: Altered Mental Status (since 0300 pt been acting confusion per daughter - pt from out of town - hx of hyponatremia - similiar symptoms with hyponatremia - pt had flu in Feb. - pt also complaining of left hip pain and right knee pain )      ED Diagnosis:   Final diagnoses:   Cerebrovascular accident (CVA), unspecified mechanism (H)       Code Status: Full Code in ED, to be addressed by admitting provider    Allergies: No Known Allergies    Activity level - Baseline/Home:  Stand with Assist    Activity Level - Current:   Unable to Assess     Needed?: No    Isolation: No  Infection: Not Applicable  Bariatric?: No    Vital Signs:   Vitals:    04/17/19 0909 04/17/19 0915 04/17/19 0930 04/17/19 1000   BP: 153/81 (!) 165/93 161/66 160/87   Pulse: 111 110 113    Resp: 19 20 (!) 74    Temp:       TempSrc:       SpO2: 95% 96% 96%    Weight:       Height:           Cardiac Rhythm: ,        Pain level:      Is this patient confused?: Yes  Does this patient have a guardian?  No         If yes, is there guardianship documents in the Epic \"Code/ACP\" activity?  N/A         Guardian Notified?  N/A  [place] - Suicide Severity Rating Scale Completed?  No, secondary to confusion  If yes, what color did the patient score?  N/A    Patient Report: Initial Complaint: AMS   Focused Assessment: Pt arrives to ED via private car with daughter, c/o confusion since 0300 this AM. Last known well when going to bed at 2130 last night (4/16). Cardiac exam exhibits probable irregular A fib, rates 100-125. Respiratory exam WNL. Neuro exam exhibits inability to follow commands, dysarthria and mild aphasia, possible L field cut. GI exam exhibits distended abdomen, pt unable to report specific abdominal symptoms. MSK exam exhibits L hip pain, R knee pain, L shoulder pain and numerous other body aches that daughter states is normal for patient. Plan to admit for noted " stroke.  Tests Performed: labs, EKG, CT head, CT abdomen, lactic, blood cultures, UA  Abnormal Results: Na 129, INR 1.2, WBC 14.7, ANC 13.9  Treatments provided: 1 L NS bolus, 300 mg ASA suppository    Family Comments: claritza Acosta at bedside    OBS brochure/video discussed/provided to patient/family: N/A              Name of person given brochure if not patient: NA              Relationship to patient: NA    ED Medications:   Medications   iopamidol (ISOVUE-370) solution 120 mL (70 mLs Intravenous Given 4/17/19 0838)   100mL Saline (100 mLs Intravenous Given 4/17/19 0838)   aspirin (ASA) Suppository 300 mg (300 mg Rectal Given 4/17/19 0930)   0.9% sodium chloride BOLUS (1,000 mLs Intravenous New Bag 4/17/19 0933)       Drips infusing?:  No    For the majority of the shift this patient was Yellow.   Interventions performed were redirection, family at bedside, repositioning.    Severe Sepsis OR Septic Shock Diagnosis Present: No    To be done/followed up on inpatient unit:  NA    ED NURSE PHONE NUMBER: [phone]

## 2019-04-17 NOTE — TELEPHONE ENCOUNTER
Radiology calling to give report on MRI results. I connected the caller to the [facility].  Nikky Childers RN-[facility]

## 2019-04-17 NOTE — CONSULTS
[facility]    Orthopedic Consultation    Belgica Klein MRN# [id number]   Age: 89 year old YOB: 1928     Date of Admission:  4/17/2019    Reason for consult: Left hip pain       Requesting physician: Dr. Li       Level of consult: Consult, follow and place orders           Assessment and Plan:   Assessment:   Bilateral total hip arthroplasties  Left hip pain  Recent right knee cortisone injection      Plan:   Will order aspiration of the left hip to rule out septic GURJIT  Please hold Abx until asp obtained           Chief Complaint:   Left hip pain - concern for septic joint         History of Present Illness:   This patient is a 89 year old female who presents with the following condition requiring a hospital admission:    Mrs Klein was very confused at the time of my visit. Daughter was present and along with chart review filled in HPI. Per daughter patients left hip has been bothering her for multiple days. Patient denies pain at this time but daughter states that with any repositioning or transfers, sitting in a chair she complains of left hip/groin pain. She was seen at [facility] yesterday for right knee cortisone injection where x-rays were taken and no gross abnormalities were seen. After returning home patient seemed to be in her normal state. Over the evening, however, she became more confused and aphasic with jumbled speech. She also had a recorded temp of 101 degrees yesterday as well. On admittance to ED a code stroke was called.   Of note she is not current on anticoagulation and has not yet received any antibiotics that I can see per chart review.          Past Medical History:   No past medical history on file.          Past Surgical History:   No past surgical history on file.          Social History:     Social History     Tobacco Use     Smoking status: Not on file   Substance Use Topics     Alcohol use: Not on file             Family History:   No family history on  file.          Immunizations:     VACCINE/DOSE   Diptheria   DPT   DTAP   HBIG   Hepatitis A   Hepatitis B   HIB   Influenza   Measles   Meningococcal   MMR   Mumps   Pneumococcal   Polio   Rubella   Small Pox   TDAP   Varicella   Zoster             Allergies:   No Known Allergies          Medications:     Current Facility-Administered Medications   Medication     acetaminophen (TYLENOL) tablet 650 mg     [START ON 4/18/2019] aspirin (ASA) EC tablet 325 mg    Or     [START ON 4/18/2019] aspirin (ASA) Suppository 300 mg     atorvastatin (LIPITOR) tablet 40 mg     labetalol (NORMODYNE/TRANDATE) syringe 10-40 mg     Medication Instruction     melatonin tablet 1 mg     naloxone (NARCAN) injection 0.1-0.4 mg     ondansetron (ZOFRAN-ODT) ODT tab 4 mg    Or     ondansetron (ZOFRAN) injection 4 mg     potassium chloride (KLOR-CON) Packet 20-40 mEq     potassium chloride 10 mEq in 100 mL intermittent infusion with 10 mg lidocaine     potassium chloride 10 mEq in 100 mL sterile water intermittent infusion (premix)     potassium chloride 20 mEq in 50 mL intermittent infusion     potassium chloride ER (K-DUR/KLOR-CON M) CR tablet 20-40 mEq     senna-docusate (SENOKOT-S/PERICOLACE) 8.6-50 MG per tablet 1 tablet    Or     senna-docusate (SENOKOT-S/PERICOLACE) 8.6-50 MG per tablet 2 tablet     sodium chloride 0.9% infusion             Review of Systems:   Unable to ROS due to patients current state         Physical Exam:   All vitals have been reviewed  Patient Vitals for the past 24 hrs:   BP Temp Temp src Pulse Heart Rate Resp SpO2 Height Weight   04/17/19 1200 -- -- -- -- -- -- 92 % -- --   04/17/19 1139 (!) 146/111 -- -- 115 117 22 93 % -- --   04/17/19 1130 (!) 146/101 -- -- 122 120 17 93 % -- --   04/17/19 1115 -- -- -- -- 123 16 95 % -- --   04/17/19 1100 148/83 -- -- 114 113 13 94 % -- --   04/17/19 1045 -- -- -- -- 118 9 94 % -- --   04/17/19 1030 142/87 -- -- 117 112 18 91 % -- --   04/17/19 1015 158/83 -- -- 113 115 17  "95 % -- --   04/17/19 1000 160/87 -- -- -- -- -- -- -- --   04/17/19 0930 161/66 -- -- 113 115 (!) 74 96 % -- --   04/17/19 0915 (!) 165/93 -- -- 110 110 20 96 % -- --   04/17/19 0909 153/81 -- -- 111 108 19 95 % -- --   04/17/19 0900 -- -- -- -- 110 27 96 % -- --   04/17/19 0844 123/87 -- -- 109 -- -- -- -- --   04/17/19 0810 -- -- -- -- -- -- -- -- 73.1 kg (161 lb 2.5 oz)   04/17/19 0801 165/86 98.8  F (37.1  C) Tympanic 100 100 20 99 % 1.727 m (5' 8\") --     No intake or output data in the 24 hours ending 04/17/19 1340    Patient laying comfortably in bed, repeating that she has to use the bathroom  Leg lengths appear equal  Mild pain with log roll  Pain increased with hip flexion to 45 degrees  No TTP over greater trochanteric bursa  Right knee with mild effusion   No erythema of right knee  No TTP of either knee  Bilateral calves are soft, non-tender.  Bilateral lower extremity is NVI.  Sensation intact bilateral lower extremities  Active dorsi and plantar flexion bilaterally  +Dp pulse          Data:   All laboratory data reviewed  Results for orders placed or performed during the hospital encounter of 04/17/19   CT Head w/o Contrast    Narrative    CT SCAN OF THE HEAD WITHOUT CONTRAST   4/17/2019 8:35 AM     HISTORY: Ataxia, stroke suspected. Code stroke.    TECHNIQUE:  Axial images of the head and coronal reformations without  IV contrast material. Radiation dose for this scan was reduced using  automated exposure control, adjustment of the mA and/or kV according  to patient size, or iterative reconstruction technique.    COMPARISON: None.    FINDINGS:  Mild volume loss is present. A 2 to 3 cm area of gray-white  differentiation loss is present involving the right mid postcentral  gyrus (series 3 image 24). This is in continuity with hypoattenuation  extending into the deep right parietal white matter. Background of  patchy white matter hypoattenuation is present likely reflecting  chronic small vessel " ischemic change. No evidence of acute hemorrhage,  mass effect, or hydrocephalus. The visualized calvarium, skull base,  paranasal sinuses, and extracranial soft tissues are unremarkable.  Bilateral lens replacements are present.      Impression    IMPRESSION:  A 2-3 cm area of hypoattenuation and gray-white  differentiation loss involving the mid right postcentral gyrus  extending into the right parietal white matter. This is suspicious for  area of acute/subacute ischemia. MRI could be performed. No evidence  of hemorrhage.    Findings were discussed by phone between Dr. Pérez and Dr. Addison at  8:40 AM on 4/17/2019.    BELINDA PÉREZ MD   CTA Head Neck with Contrast   Result Value Ref Range    Radiologist flags Incidental thyroid nodule and aneurysm (Urgent)     Narrative    CT ANGIOGRAM OF THE HEAD AND NECK WITH CONTRAST  4/17/2019 8:37 AM     HISTORY: Ataxia, stroke suspected. Code stroke.    TECHNIQUE:  CT angiography with an injection of 70 mL Isovue-370 IV  with scans through the head and neck. Images were transferred to a  separate 3-D workstation where multiplanar reformations and 3-D images  were created. Estimates of carotid stenoses are made relative to the  distal internal carotid artery diameters except as noted. Radiation  dose for this scan was reduced using automated exposure control,  adjustment of the mA and/or kV according to patient size, or iterative  reconstruction technique.    COMPARISON: None.     CT ANGIOGRAM HEAD FINDINGS:  The vertebral arteries, basilar artery,  internal carotid arteries, anterior cerebral arteries, and middle  cerebral arteries are patent. Scattered vascular calcifications are  present. There is fetal origin of the right posterior cerebral artery.  Patent left posterior communicating artery is present. Patent anterior  communicating artery is present. No evidence of large vessel  occlusion. A broad-based 4 mm saccular aneurysm is present off the  distal left middle  cerebral artery M1 segment. The aneurysm projects  posteriorly (series 9 image 164). Dural venous sinuses are without  appreciable abnormality.     CT ANGIOGRAM NECK FINDINGS:  A three-vessel aortic arch is present.  The bilateral common carotid, internal carotid, external carotid, and  vertebral arteries are patent. There is severe atherosclerotic  plaquing at the bilateral carotid bifurcations. There is approximately  30% narrowing of the proximal right internal carotid artery and  approximately 50% narrowing of the proximal left internal carotid  artery.     An enhancing right thyroid nodule is present measuring up to 3.9 cm.  There is a retropharyngeal course of the right internal carotid  artery. Degenerative changes are present throughout the spine. There  is fusion across multiple bilateral cervical spine facet joints.      Impression    IMPRESSION:  CTA HEAD:  1. Patent intracranial circulation.  2. 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    CTA NECK:  1. Severe atherosclerotic plaquing at the bilateral carotid  bifurcations with approximately 20-30% narrowing of the proximal right  internal carotid artery and approximately 40-50% narrowing of the  proximal left internal carotid artery.  2. Enhancing right thyroid nodule measuring up to 3.9 cm. Recommend  correlation with ultrasound-guided fine-needle aspiration.    [Access Center: Incidental thyroid nodule and aneurysm]    This report will be copied to the [facility] to ensure a  provider acknowledges the finding. [facility] Center is available Monday  through Friday 8am-3:30 pm.     BELINDA DAVIS MD   CT Abdomen Pelvis w/o Contrast    Narrative    CT ABDOMEN AND PELVIS WITHOUT CONTRAST  4/17/2019 8:51 AM    HISTORY: Abdominal distention and abdominal pain.    COMPARISON: None.    TECHNIQUE: Routine transverse CT  imaging of the abdomen and pelvis was  performed without contrast. Radiation dose for this scan was reduced  using automated exposure control, adjustment of the mA and/or kV  according to patient size, or iterative reconstruction technique.    FINDINGS: There is a 0.3 cm noncalcified nodule in the posterior  aspect of the left lung base. There is mild atelectasis elsewhere in  both lung bases. There is also a small left pleural effusion. The  liver, spleen, pancreas, gallbladder, and adrenal glands are normal.  There is a 7.0 cm simple-appearing cyst of the right kidney. No other  renal abnormality is identified. The bladder is obscured by artifact  from bilateral hip arthroplasties. However, no abnormality is seen  within the limited visualized portion of the bladder. No enlarged  lymph node or other abnormal mass is demonstrated. No free fluid is  seen. No free intraperitoneal gas is identified. The gastrointestinal  tract is unremarkable. The appendix is not identified. There is no  additional evidence of appendicitis. There is calcification in the  vascular structures. There are degenerative changes of the spine. No  other osseous abnormality is seen. There appears to be atrophy of the  anterior abdominal wall musculature with a slight outpouching of the  peritoneal lining adjacent to the musculature. However, no focal  hernia is seen. No other abdominal or pelvic wall pathology is  demonstrated.       Impression    IMPRESSION:   1. No acute abnormality is seen. Specifically, no gastrointestinal  tract pathology is noted.   2. There is a 0.3 cm noncalcified nodule in the posterior left lung  base. Recommendations for one or multiple incidental lung nodules <  6mm :    Low risk patients: No routine follow-up.    High risk patients: Optional follow-up CT at 12 months; if  unchanged, no further follow-up.    *Low Risk: Minimal or absent history of smoking or other known risk  factors.  *Nonsolid (ground glass) or  partly solid nodules may require longer  follow-up to exclude indolent adenocarcinoma.  *Recommendations based on Guidelines for the Management of Incidental  Pulmonary Nodules Detected at CT: From the Fleischner Society 2017,  Radiology 2017.    NAZARIO LEAVITT MD   XR Chest 2 Views    Narrative    CHEST TWO VIEWS  4/17/2019 9:59 AM    HISTORY: Cough.    COMPARISON: None.      Impression    IMPRESSION: The heart size is normal. There is a small left pleural  effusion. No right effusion or pneumothorax is seen. There are  surgical changes of the right shoulder with advanced degenerative  changes of the left shoulder. There is moderate diffuse vascular  prominence suggesting vascular congestion. There may be mild  atelectasis in the left lung base. The lungs are otherwise clear. No  other abnormality is seen.     NAZARIO LEAVITT MD   XR Knee Right 3 Views    Narrative    RIGHT KNEE THREE VIEWS   4/17/2019 9:59 AM    HISTORY: Right knee pain.    COMPARISON: None.    FINDINGS: No fracture or acute abnormality is demonstrated. There is  moderate medial and lateral compartment joint space loss with mild  chondrocalcinosis. There are similar or slightly less extensive  degenerative changes of the patellofemoral joint. No other abnormality  is seen.      Impression    IMPRESSION: Degenerative changes as described above.     NAZARIO LEAVITT MD   Lactic acid whole blood   Result Value Ref Range    Lactic Acid 1.3 0.7 - 2.0 mmol/L   UA with Microscopic   Result Value Ref Range    Color Urine Yellow     Appearance Urine Clear     Glucose Urine Negative NEG^Negative mg/dL    Bilirubin Urine Negative NEG^Negative    Ketones Urine Negative NEG^Negative mg/dL    Specific Gravity Urine 1.017 1.003 - 1.035    Blood Urine Small (A) NEG^Negative    pH Urine 6.0 5.0 - 7.0 pH    Protein Albumin Urine 30 (A) NEG^Negative mg/dL    Urobilinogen mg/dL Normal 0.0 - 2.0 mg/dL    Nitrite Urine Negative NEG^Negative    Leukocyte  Esterase Urine Negative NEG^Negative    Source Catheterized Urine     WBC Urine 1 0 - 5 /HPF    RBC Urine 1 0 - 2 /HPF   Basic metabolic panel   Result Value Ref Range    Sodium 129 (L) 133 - 144 mmol/L    Potassium 3.9 3.4 - 5.3 mmol/L    Chloride 96 94 - 109 mmol/L    Carbon Dioxide 22 20 - 32 mmol/L    Anion Gap 11 3 - 14 mmol/L    Glucose 136 (H) 70 - 99 mg/dL    Urea Nitrogen 25 7 - 30 mg/dL    Creatinine 0.84 0.52 - 1.04 mg/dL    GFR Estimate 61 >60 mL/min/[1.73_m2]    GFR Estimate If Black 70 >60 mL/min/[1.73_m2]    Calcium 9.7 8.5 - 10.1 mg/dL   CBC with platelets differential   Result Value Ref Range    WBC 14.7 (H) 4.0 - 11.0 10e9/L    RBC Count 3.65 (L) 3.8 - 5.2 10e12/L    Hemoglobin 11.4 (L) 11.7 - 15.7 g/dL    Hematocrit 31.6 (L) 35.0 - 47.0 %    MCV 87 78 - 100 fl    MCH 31.2 26.5 - 33.0 pg    MCHC 36.1 31.5 - 36.5 g/dL    RDW 14.1 10.0 - 15.0 %    Platelet Count 199 150 - 450 10e9/L    Diff Method Automated Method     % Neutrophils 94.7 %    % Lymphocytes 1.8 %    % Monocytes 3.1 %    % Eosinophils 0.0 %    % Basophils 0.1 %    % Immature Granulocytes 0.3 %    Nucleated RBCs 0 0 /100    Absolute Neutrophil 13.9 (H) 1.6 - 8.3 10e9/L    Absolute Lymphocytes 0.3 (L) 0.8 - 5.3 10e9/L    Absolute Monocytes 0.5 0.0 - 1.3 10e9/L    Absolute Eosinophils 0.0 0.0 - 0.7 10e9/L    Absolute Basophils 0.0 0.0 - 0.2 10e9/L    Abs Immature Granulocytes 0.0 0 - 0.4 10e9/L    Absolute Nucleated RBC 0.0    INR   Result Value Ref Range    INR 1.20 (H) 0.86 - 1.14   Partial thromboplastin time   Result Value Ref Range    PTT 35 22 - 37 sec   Hepatic panel   Result Value Ref Range    Bilirubin Direct 0.4 (H) 0.0 - 0.2 mg/dL    Bilirubin Total 0.8 0.2 - 1.3 mg/dL    Albumin 3.0 (L) 3.4 - 5.0 g/dL    Protein Total 7.4 6.8 - 8.8 g/dL    Alkaline Phosphatase 128 40 - 150 U/L    ALT 30 0 - 50 U/L    AST 23 0 - 45 U/L   Lipase   Result Value Ref Range    Lipase 77 73 - 393 U/L   EKG 12 lead   Result Value Ref Range     Interpretation ECG Click View Image link to view waveform and result    ABO/Rh type and screen   Result Value Ref Range    ABO A     RH(D) Pos     Antibody Screen Neg     Test Valid Only At [facility]        Specimen Expires 04/20/2019    Influenza A/B antigen   Result Value Ref Range    Influenza A/B Agn Specimen Nasopharyngeal     Influenza A Negative NEG^Negative    Influenza B Negative NEG^Negative          Attestation:  I have reviewed today's vital signs, notes, medications, labs and imaging with Dr. Zhang.  Amount of time performed on this consult: 25 minutes.    Shyla Suggs PA-C

## 2019-04-17 NOTE — PROGRESS NOTES
For a patient that is not on any blood thinner inhibitors  Patient is scheduled for a left hip aspiration on 4/17/19 at 1500. The patient's present medications and allergies with the patient s nurse and the patient is not on any blood thinners.    The patient will be consented for the procedure when they come down to radiology.   Blood Thinner Holds for Joint Injections  No need to hold-     Aspirin, NSAIDs-Ibuprofen, Motrin, Advil, etc. ALMONTE-2 inhibitors-Celebrex, Vioxx, Bextra. Salsalate    Platelet inhibitors- Clopidogrel/Plavix; Ticlopidine/Ticlid; Dipyridamole/Persantine; Aggrenox; Cilostazol/Pletal; Prasugrel /Effient;  Ticagrelor/Brilinta    Blood thinners    Coumadin (Warfarin)-Hold 3 days, need INR 1.5 or less.     Dabigatrin (Pradaxa) Hold 2 days (4 doses)     Rivaroxaban (Xarelto) Hold 24 hours (1dose)    IV Heparin (therapeutic-infusion) -Hold 4 hours & check PTT  prior to procedure   SC Heparin (Prophylactic-5000 units BID or TID) -Hold 8-12 hours (1 dose)    SC Enoxaparin     Therapeutic-1 mg/kg Q24h or q12h)-hold 24 hrs (2 doses)     Prophylactic-- 30mg BID or 40mg Qday) -hold 12-24 hrs (1 dose)  SC Arixtra-     Therapeutic-5-7.5mg q24h)- Hold 24 hours (1 dose)    Prophylactic-2.5 mg q24h)- hold 24 hours (1 dose)

## 2019-04-17 NOTE — PLAN OF CARE
Patient A&0 to self. Confabulated speech. Unable to follow commands with any consistency. No neuro deficits noted. Tele is ST w BBB and frequent PVC's. Lungs diminished. Congested nonproductive cough noted.  92% on RA. Abdomin distended. Bowel sounds active. Patient unable to urinate. Bladder scanned for 600, straight cathed for 700.

## 2019-04-17 NOTE — PROGRESS NOTES
Per Dr. Andrade, pt OK to receive a diet. Bedside swallow assessment completed. No slurred speech, no facial droop. Congested cough that daughter states pt has had for approximately 6 weeks. Completed oral care with difficulty as patient kept teeth clenched. Attempted to have pt swallow water, unable to hold cup herself and pushing cup away with her tongue when it was brought up to her mouth. Will defer diet orders at this time secondary to AMS and disorganized behavior, speech to see tomorrow for formal swallow evaluation.

## 2019-04-17 NOTE — CONSULTS
"[facility]    Stroke Code / Consult Note    Reason for Consult:   Stroke Code    HPI:  Belgica Klein is a 89 year old female from [facility] with a history of hypertension, osteoarthritis of R knee s/p kenalog injection yesterday, and L total hip arthroplasty, suspected TIA with aphasia in 12/2018, and reoccurring hyponatremia (last ED visit 3 days ago with NA to 131), who presents with her daughter for confusion, inability to follow commands, and nonsensical communication. Last known normal was 9 pm last night (4/16/19).     Yesterday (4/16/19):    Pt was having some pain in her R knee and L hip that has made ambulation difficult for the past few days and has been taking tylenol for this. She was seen at [facility] orthopedics and was given a cortisone injection in her R knee. She was otherwise in her usual state of health and went to sleep next to her daughter around 9pm.         Today (4/17/19)    Pt was noted to be confused at 3am. She was unable to follow commands, had trouble with recognition, and her speech was not making sense. Daughter notes that her sister has periodically observed this behavior in the pt when she stays with her sister but this is new to her. They think it might be due to \"low sodium\" because the pt had similar similar presentation to the one today.     Pt is somewhat agitated today and has difficulty obeying commands and laying still for imaging. Physical exam was also difficult to perform due to this.     ___________________________________________________________________  Chart review:    3 days ago (4/14/19):    Presented to [facility] ED in [place] for weakness and urinary frequency. Daughter noted pt has had hyponatremia to 127 in the past. She was found to have hyponatremia to 131 and UA with + nitirties and WBCs. Was given Normal saline but no UTI tx.    Roughly 4 months ago (12/6/19):    She was hospitalized in [place] for unsteady gate and aphasia for " less than 6 hours. No prior history of TIA/Stroke before this. She was further worked up for TIA. CT head with no acute intraparenchymal abnormality and MRI brain with no acute intracranial abnormality. Carotid doppler showed bilateral plaques with stenosis of 50-60% bilaterally. Neurological symptoms were noted to resolved fast than expected and concurrent E.coli UTI was treated with Levaquin. She was discharged on 325mg of ASA and 10mg of atorvastatin in addition to her PTA meds.        Acute stroke therapy  TPA Treatment   Not given due to outside the time window.  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   -Hyponatremia  -Chronic pain  -Hypertension  -Hyperlipidemia    Recommendations:    #Right postcentral gyrus infarct  - Neurochecks every 4 hours  - Permissive HTN; labetalol PRN for SBP > 220  - Daily aspirin for secondary stroke prevention: Aspirin 325 mg  - Statin: Lipitor 40 mg daily  - MRI Stroke Protocol  - TTE with Bubble Study  - Telemetry, EKG  - Bedside Glucose Monitoring  - A1c, Lipid Panel, Troponin x 3  - PT/OT/SLP  - PM&R  - Stroke Education    #Hyponatremia  -As per daughter, patient has had multiple episodes of hyponatremia in the past  -These are associated with confusion  -Management as per hospitalist    Patient Follow-up    - Final recommendation pending work-up    Plan was discussed with patient and patient's daughter at bedside who expressed understanding, we will continue to follow along closely please call us with any questions or concerns. Case was seen and discussed with Dr. Andrade.    Valeri Hess MD  Vascular Neurology fellow  Pager # [phone]    __________________________________________________    Past medical history:   1.  Transient ischemic attack.   2.  Hypertension.   3.  Hyperlipidemia.   4.  Hyponatremia.   5.  Benign essential hypertension.   6.  Recurrent UTIs.     "  Past surgical history:  She has had bilateral hip replacement done.     Social history: She lives in [place] during the summer months and in [place] during the winter months. She is visiting her mom at the [facility] at the moment. Denies tobacco use, drinks alcohol socially.  No recreational drug use.      Family history: Reviewed and is noncontributory.     Medications Prior to Admission   Medication Sig Dispense Refill Last Dose     amLODIPine (NORVASC) 5 MG tablet Take 5 mg by mouth daily   4/14/2019 at am     aspirin 81 MG EC tablet Take 81 mg by mouth daily   4/14/2019 at am     atorvastatin (LIPITOR) 10 MG tablet Take 10 mg by mouth daily   4/14/2019 at am     hydrochlorothiazide (HYDRODIURIL) 12.5 MG tablet Take 12.5 mg by mouth daily   4/14/2019 at am     losartan (COZAAR) 100 MG tablet Take 100 mg by mouth daily   4/14/2019 at am     Allergies   No Known Allergies    ROS  Review of systems not obtained due to patient factors - confusion    PHYSICAL EXAMINATION  /86   Pulse 100   Temp 98.8  F (37.1  C) (Tympanic)   Resp 20   Ht 1.727 m (5' 8\")   SpO2 99%   General: Patient lying in bed, appears to be in mild distress from pain.  HEENT: normocephalic/atraumatic  Cardio: RRR  Pulmonary: no respiratory distress  Abdomen: Distended  Extremities: no edema  Skin: intact   Neurologic  Mental Status: Awake, oriented to self and daughter only, not oriented to month/year.  Intermittently agitated/confused and needs repeated redirection to follow commands.mild dysarthria, no clear aphasia noted.  Cranial Nerves: PERRL, facial sensation intact and symmetric, facial movements symmetric, hearing not formally tested but intact to conversation, palate elevation symmetric and uvula midline, shoulder shrug strong bilaterally, tongue protrusion midline, Has a mild right gaze preference with suspected visual neglect/left homonymous hemianopia  Motor: Could not clearly assess pronator drift in the left upper " extremity due to shoulder injury,  strength appears to be intact, left hip limited examination due to chronic pain.  Reflexes: no clonus  Sensory: intact/symmetric to light touch and pin prick throughout upper and lower extremities  Coordination: Unable to assess due to agitation  Station/Gait: unable to test    NIHSS  Interval and Comments baseline   1a. Level of Consciousness 0-->Alert, keenly responsive   1b. LOC Questions 0-->Answers both questions correctly   1c. LOC Commands 1-->Performs one task correctly   2.   Best Gaze 1-->Partial gaze palsy, gaze is abnormal in one or both eyes, but forced deviation or total gaze paresis is not present(Mild Right gaze preference)   3.   Visual 2-->Complete hemianopia   4.   Facial Palsy 0-->Normal symmetrical movements   5a. Motor Arm, Left 0-->No drift, limb holds 90 (or 45) degrees for full 10 secs   5b. Motor Arm, Right 0-->No drift, limb holds 90 (or 45) degrees for full 10 secs   6a. Motor Leg, Left 0-->No drift, leg holds 30 degree position for full 5 secs   6b. Motor Leg, right 0-->No drift, leg holds 30 degree position for full 5 secs   7.   Limb Ataxia 0-->Absent   8.   Sensory 0-->Normal, no sensory loss   9.   Best Language 0-->No aphasia, normal   10. Dysarthria 0-->Normal   11. Extinction and Inattention  0-->No abnormality   Total 4     Labs/Imaging  Labs and imaging were reviewed and used in developing plan; pertinent results included.  Data   CBC  No results found for: WBC No results found for: RBC No results found for: HGB   No results found for: HCT No results found for: PLT      BMP  No results found for: NA No results found for: POTASSIUM No results found for: CHLORIDE   No results found for: CO2 No results found for: GLC No results found for: BUN   No results found for: CR No results found for: LULU      INR Troponin I A1C   No results found for: INR No results found for: TROPI No results found for: A1C     Liver Panel  No results found for:  PROTTOTAL No results found for: ALBUMIN No results found for: BILITOTAL   No results found for: ALKPHOS No results found for: AST No results found for: ALT   No results found for: BILIDIRECT       Lipid Profile  No results found for: CHOL No results found for: HDL No results found for: LDL   No results found for: TRIG No results found for: CHOLHDLRATIO      Stroke Code Data  (for stroke code without tele)  Stroke code activated 04/17/19   0816   First stroke provider response 04/17/19   0820   Last known normal 04/16/19   2100   Time of discovery   (or onset of symptoms) 04/17/19   (Around 0800)   Head CT read by me 04/17/19   0837   Was stroke code de-escalated? Yes 04/17/19 0849  patient is outside emergent treatment time parameters       Scribe provided by MS 3 Lamin Shahid

## 2019-04-17 NOTE — PROGRESS NOTES
MD Notification    Notified Person: MD    Notified Person Name: Jen    Notification Date/Time: 4/17/9 @6918    Notification Interaction: Paged    Purpose of Notification:   FYI: patient did not have aspiration done due to restlessness. Rescheduled for 11am tomorrow.  Nurse requested sedative be ordered for procedure tomorrow morning.   Patient had temperature of 101.2 after returning from attempted aspiration.    Orders Received: Ativan prior to aspiration tomorrow. Continue to monitor temp.     Comments:

## 2019-04-18 ENCOUNTER — ANESTHESIA EVENT (OUTPATIENT)
Dept: SURGERY | Facility: CLINIC | Age: 84
DRG: 981 | End: 2019-04-18
Payer: MEDICARE

## 2019-04-18 ENCOUNTER — APPOINTMENT (OUTPATIENT)
Dept: GENERAL RADIOLOGY | Facility: CLINIC | Age: 84
DRG: 981 | End: 2019-04-18
Attending: INTERNAL MEDICINE
Payer: MEDICARE

## 2019-04-18 ENCOUNTER — APPOINTMENT (OUTPATIENT)
Dept: GENERAL RADIOLOGY | Facility: CLINIC | Age: 84
DRG: 981 | End: 2019-04-18
Attending: PHYSICIAN ASSISTANT
Payer: MEDICARE

## 2019-04-18 ENCOUNTER — APPOINTMENT (OUTPATIENT)
Dept: MRI IMAGING | Facility: CLINIC | Age: 84
DRG: 981 | End: 2019-04-18
Attending: INTERNAL MEDICINE
Payer: MEDICARE

## 2019-04-18 ENCOUNTER — ANESTHESIA (OUTPATIENT)
Dept: SURGERY | Facility: CLINIC | Age: 84
DRG: 981 | End: 2019-04-18
Payer: MEDICARE

## 2019-04-18 PROBLEM — M00.9 SEPTIC HIP (H): Status: ACTIVE | Noted: 2019-04-18

## 2019-04-18 LAB
ANION GAP SERPL CALCULATED.3IONS-SCNC: 11 MMOL/L (ref 3–14)
APPEARANCE FLD: NORMAL
BUN SERPL-MCNC: 24 MG/DL (ref 7–30)
CALCIUM SERPL-MCNC: 8.8 MG/DL (ref 8.5–10.1)
CHLORIDE SERPL-SCNC: 105 MMOL/L (ref 94–109)
CO2 SERPL-SCNC: 20 MMOL/L (ref 20–32)
COLOR FLD: NORMAL
CREAT SERPL-MCNC: 0.67 MG/DL (ref 0.52–1.04)
CRYSTALS SNV MICRO: NORMAL
ERYTHROCYTE [DISTWIDTH] IN BLOOD BY AUTOMATED COUNT: 14.5 % (ref 10–15)
ERYTHROCYTE [SEDIMENTATION RATE] IN BLOOD BY WESTERGREN METHOD: 80 MM/H (ref 0–30)
GFR SERPL CREATININE-BSD FRML MDRD: 77 ML/MIN/{1.73_M2}
GLUCOSE BLDC GLUCOMTR-MCNC: 151 MG/DL (ref 70–99)
GLUCOSE BLDC GLUCOMTR-MCNC: 170 MG/DL (ref 70–99)
GLUCOSE BLDC GLUCOMTR-MCNC: 173 MG/DL (ref 70–99)
GLUCOSE SERPL-MCNC: 139 MG/DL (ref 70–99)
GRAM STN SPEC: ABNORMAL
HCT VFR BLD AUTO: 30.5 % (ref 35–47)
HGB BLD-MCNC: 10.9 G/DL (ref 11.7–15.7)
LYMPHOCYTES NFR FLD MANUAL: 9 %
Lab: ABNORMAL
MAGNESIUM SERPL-MCNC: 1.9 MG/DL (ref 1.6–2.3)
MCH RBC QN AUTO: 31 PG (ref 26.5–33)
MCHC RBC AUTO-ENTMCNC: 35.7 G/DL (ref 31.5–36.5)
MCV RBC AUTO: 87 FL (ref 78–100)
MONOS+MACROS NFR FLD MANUAL: 19 %
NEUTS BAND NFR FLD MANUAL: 72 %
PHOSPHATE SERPL-MCNC: 2.6 MG/DL (ref 2.5–4.5)
PLATELET # BLD AUTO: 137 10E9/L (ref 150–450)
POTASSIUM SERPL-SCNC: 3.7 MMOL/L (ref 3.4–5.3)
POTASSIUM SERPL-SCNC: 4.4 MMOL/L (ref 3.4–5.3)
RBC # BLD AUTO: 3.52 10E12/L (ref 3.8–5.2)
SODIUM SERPL-SCNC: 136 MMOL/L (ref 133–144)
SPECIMEN SOURCE FLD: NORMAL
SPECIMEN SOURCE SNV: NORMAL
SPECIMEN SOURCE: ABNORMAL
SPECIMEN SOURCE: ABNORMAL
WBC # BLD AUTO: 10.6 10E9/L (ref 4–11)
WBC # FLD AUTO: NORMAL /UL

## 2019-04-18 PROCEDURE — 40000008 ZZH STATISTIC AIRWAY CARE

## 2019-04-18 PROCEDURE — A9585 GADOBUTROL INJECTION: HCPCS | Performed by: INTERNAL MEDICINE

## 2019-04-18 PROCEDURE — 25800030 ZZH RX IP 258 OP 636: Performed by: NURSE ANESTHETIST, CERTIFIED REGISTERED

## 2019-04-18 PROCEDURE — 25000566 ZZH SEVOFLURANE, EA 15 MIN: Performed by: ORTHOPAEDIC SURGERY

## 2019-04-18 PROCEDURE — 94002 VENT MGMT INPAT INIT DAY: CPT

## 2019-04-18 PROCEDURE — 25800030 ZZH RX IP 258 OP 636: Performed by: INTERNAL MEDICINE

## 2019-04-18 PROCEDURE — 37000009 ZZH ANESTHESIA TECHNICAL FEE, EACH ADDTL 15 MIN: Performed by: ORTHOPAEDIC SURGERY

## 2019-04-18 PROCEDURE — 87070 CULTURE OTHR SPECIMN AEROBIC: CPT | Performed by: PHYSICIAN ASSISTANT

## 2019-04-18 PROCEDURE — 87205 SMEAR GRAM STAIN: CPT | Performed by: PHYSICIAN ASSISTANT

## 2019-04-18 PROCEDURE — 99233 SBSQ HOSP IP/OBS HIGH 50: CPT | Performed by: INTERNAL MEDICINE

## 2019-04-18 PROCEDURE — 40000986 XR CHEST PORT 1 VW

## 2019-04-18 PROCEDURE — 93005 ELECTROCARDIOGRAM TRACING: CPT

## 2019-04-18 PROCEDURE — 99233 SBSQ HOSP IP/OBS HIGH 50: CPT | Mod: GC | Performed by: PSYCHIATRY & NEUROLOGY

## 2019-04-18 PROCEDURE — 85652 RBC SED RATE AUTOMATED: CPT | Performed by: PHYSICIAN ASSISTANT

## 2019-04-18 PROCEDURE — 25000128 H RX IP 250 OP 636: Performed by: ORTHOPAEDIC SURGERY

## 2019-04-18 PROCEDURE — 87077 CULTURE AEROBIC IDENTIFY: CPT | Performed by: PHYSICIAN ASSISTANT

## 2019-04-18 PROCEDURE — 87075 CULTR BACTERIA EXCEPT BLOOD: CPT | Performed by: ORTHOPAEDIC SURGERY

## 2019-04-18 PROCEDURE — 40000275 ZZH STATISTIC RCP TIME EA 10 MIN

## 2019-04-18 PROCEDURE — 25800030 ZZH RX IP 258 OP 636: Performed by: ANESTHESIOLOGY

## 2019-04-18 PROCEDURE — 20000003 ZZH R&B ICU

## 2019-04-18 PROCEDURE — 36000056 ZZH SURGERY LEVEL 3 1ST 30 MIN: Performed by: ORTHOPAEDIC SURGERY

## 2019-04-18 PROCEDURE — 84100 ASSAY OF PHOSPHORUS: CPT | Performed by: INTERNAL MEDICINE

## 2019-04-18 PROCEDURE — 85027 COMPLETE CBC AUTOMATED: CPT | Performed by: INTERNAL MEDICINE

## 2019-04-18 PROCEDURE — 84132 ASSAY OF SERUM POTASSIUM: CPT | Performed by: INTERNAL MEDICINE

## 2019-04-18 PROCEDURE — 87070 CULTURE OTHR SPECIMN AEROBIC: CPT | Performed by: ORTHOPAEDIC SURGERY

## 2019-04-18 PROCEDURE — 25000132 ZZH RX MED GY IP 250 OP 250 PS 637: Performed by: INTERNAL MEDICINE

## 2019-04-18 PROCEDURE — A9270 NON-COVERED ITEM OR SERVICE: HCPCS | Performed by: INTERNAL MEDICINE

## 2019-04-18 PROCEDURE — 36000058 ZZH SURGERY LEVEL 3 EA 15 ADDTL MIN: Performed by: ORTHOPAEDIC SURGERY

## 2019-04-18 PROCEDURE — 25500064 ZZH RX 255 OP 636: Performed by: INTERNAL MEDICINE

## 2019-04-18 PROCEDURE — 80048 BASIC METABOLIC PNL TOTAL CA: CPT | Performed by: INTERNAL MEDICINE

## 2019-04-18 PROCEDURE — 87077 CULTURE AEROBIC IDENTIFY: CPT | Performed by: ORTHOPAEDIC SURGERY

## 2019-04-18 PROCEDURE — P9041 ALBUMIN (HUMAN),5%, 50ML: HCPCS | Performed by: NURSE ANESTHETIST, CERTIFIED REGISTERED

## 2019-04-18 PROCEDURE — 99291 CRITICAL CARE FIRST HOUR: CPT | Performed by: INTERNAL MEDICINE

## 2019-04-18 PROCEDURE — 25000128 H RX IP 250 OP 636

## 2019-04-18 PROCEDURE — 0S9B3ZX DRAINAGE OF LEFT HIP JOINT, PERCUTANEOUS APPROACH, DIAGNOSTIC: ICD-10-PCS | Performed by: PHYSICIAN ASSISTANT

## 2019-04-18 PROCEDURE — 87205 SMEAR GRAM STAIN: CPT | Performed by: ORTHOPAEDIC SURGERY

## 2019-04-18 PROCEDURE — 25000128 H RX IP 250 OP 636: Performed by: INTERNAL MEDICINE

## 2019-04-18 PROCEDURE — 25000128 H RX IP 250 OP 636: Performed by: NURSE ANESTHETIST, CERTIFIED REGISTERED

## 2019-04-18 PROCEDURE — 93010 ELECTROCARDIOGRAM REPORT: CPT | Performed by: INTERNAL MEDICINE

## 2019-04-18 PROCEDURE — 25000125 ZZHC RX 250: Performed by: NURSE ANESTHETIST, CERTIFIED REGISTERED

## 2019-04-18 PROCEDURE — 00000146 ZZHCL STATISTIC GLUCOSE BY METER IP

## 2019-04-18 PROCEDURE — 0S9B0ZZ DRAINAGE OF LEFT HIP JOINT, OPEN APPROACH: ICD-10-PCS | Performed by: ORTHOPAEDIC SURGERY

## 2019-04-18 PROCEDURE — 40000171 ZZH STATISTIC PRE-PROCEDURE ASSESSMENT III: Performed by: ORTHOPAEDIC SURGERY

## 2019-04-18 PROCEDURE — 36415 COLL VENOUS BLD VENIPUNCTURE: CPT | Performed by: PHYSICIAN ASSISTANT

## 2019-04-18 PROCEDURE — 25800025 ZZH RX 258: Performed by: ORTHOPAEDIC SURGERY

## 2019-04-18 PROCEDURE — 25000125 ZZHC RX 250: Performed by: INTERNAL MEDICINE

## 2019-04-18 PROCEDURE — 27210794 ZZH OR GENERAL SUPPLY STERILE: Performed by: ORTHOPAEDIC SURGERY

## 2019-04-18 PROCEDURE — 83735 ASSAY OF MAGNESIUM: CPT | Performed by: INTERNAL MEDICINE

## 2019-04-18 PROCEDURE — 89051 BODY FLUID CELL COUNT: CPT | Performed by: PHYSICIAN ASSISTANT

## 2019-04-18 PROCEDURE — 87186 SC STD MICRODIL/AGAR DIL: CPT | Performed by: PHYSICIAN ASSISTANT

## 2019-04-18 PROCEDURE — 70553 MRI BRAIN STEM W/O & W/DYE: CPT

## 2019-04-18 PROCEDURE — 3E043XZ INTRODUCTION OF VASOPRESSOR INTO CENTRAL VEIN, PERCUTANEOUS APPROACH: ICD-10-PCS | Performed by: INTERNAL MEDICINE

## 2019-04-18 PROCEDURE — 89060 EXAM SYNOVIAL FLUID CRYSTALS: CPT | Performed by: PHYSICIAN ASSISTANT

## 2019-04-18 PROCEDURE — 27211111 XR JOINT ASPIRATION MAJOR LEFT

## 2019-04-18 PROCEDURE — 37000008 ZZH ANESTHESIA TECHNICAL FEE, 1ST 30 MIN: Performed by: ORTHOPAEDIC SURGERY

## 2019-04-18 RX ORDER — SODIUM CHLORIDE 9 MG/ML
INJECTION, SOLUTION INTRAVENOUS CONTINUOUS PRN
Status: DISCONTINUED | OUTPATIENT
Start: 2019-04-18 | End: 2019-04-18

## 2019-04-18 RX ORDER — ALBUMIN, HUMAN INJ 5% 5 %
SOLUTION INTRAVENOUS CONTINUOUS PRN
Status: DISCONTINUED | OUTPATIENT
Start: 2019-04-18 | End: 2019-04-18

## 2019-04-18 RX ORDER — TRAMADOL HYDROCHLORIDE 50 MG/1
50 TABLET ORAL EVERY 6 HOURS PRN
Status: DISCONTINUED | OUTPATIENT
Start: 2019-04-18 | End: 2019-04-24

## 2019-04-18 RX ORDER — PROPOFOL 10 MG/ML
INJECTION, EMULSION INTRAVENOUS
Status: COMPLETED
Start: 2019-04-18 | End: 2019-04-18

## 2019-04-18 RX ORDER — POTASSIUM CHLORIDE 29.8 MG/ML
20 INJECTION INTRAVENOUS
Status: DISCONTINUED | OUTPATIENT
Start: 2019-04-18 | End: 2019-04-24

## 2019-04-18 RX ORDER — NALOXONE HYDROCHLORIDE 0.4 MG/ML
.1-.4 INJECTION, SOLUTION INTRAMUSCULAR; INTRAVENOUS; SUBCUTANEOUS
Status: DISCONTINUED | OUTPATIENT
Start: 2019-04-18 | End: 2019-04-30 | Stop reason: HOSPADM

## 2019-04-18 RX ORDER — PROPOFOL 10 MG/ML
INJECTION, EMULSION INTRAVENOUS PRN
Status: DISCONTINUED | OUTPATIENT
Start: 2019-04-18 | End: 2019-04-18

## 2019-04-18 RX ORDER — ACETAMINOPHEN 325 MG/1
975 TABLET ORAL EVERY 8 HOURS
Status: ACTIVE | OUTPATIENT
Start: 2019-04-18 | End: 2019-04-21

## 2019-04-18 RX ORDER — HYDROMORPHONE HYDROCHLORIDE 1 MG/ML
.3-.5 INJECTION, SOLUTION INTRAMUSCULAR; INTRAVENOUS; SUBCUTANEOUS
Status: DISCONTINUED | OUTPATIENT
Start: 2019-04-18 | End: 2019-04-18

## 2019-04-18 RX ORDER — NICOTINE POLACRILEX 4 MG
15-30 LOZENGE BUCCAL
Status: DISCONTINUED | OUTPATIENT
Start: 2019-04-18 | End: 2019-04-30 | Stop reason: HOSPADM

## 2019-04-18 RX ORDER — BENZTROPINE MESYLATE 1 MG/1
1-2 TABLET ORAL 3 TIMES DAILY PRN
Status: DISCONTINUED | OUTPATIENT
Start: 2019-04-18 | End: 2019-04-30 | Stop reason: HOSPADM

## 2019-04-18 RX ORDER — DEXTROSE MONOHYDRATE 25 G/50ML
25-50 INJECTION, SOLUTION INTRAVENOUS
Status: DISCONTINUED | OUTPATIENT
Start: 2019-04-18 | End: 2019-04-30 | Stop reason: HOSPADM

## 2019-04-18 RX ORDER — DEXTROSE MONOHYDRATE 25 G/50ML
25-50 INJECTION, SOLUTION INTRAVENOUS
Status: DISCONTINUED | OUTPATIENT
Start: 2019-04-18 | End: 2019-04-18

## 2019-04-18 RX ORDER — CEFAZOLIN SODIUM 2 G/100ML
2 INJECTION, SOLUTION INTRAVENOUS EVERY 8 HOURS
Status: DISCONTINUED | OUTPATIENT
Start: 2019-04-18 | End: 2019-04-18

## 2019-04-18 RX ORDER — FENTANYL CITRATE 50 UG/ML
INJECTION, SOLUTION INTRAMUSCULAR; INTRAVENOUS PRN
Status: DISCONTINUED | OUTPATIENT
Start: 2019-04-18 | End: 2019-04-18

## 2019-04-18 RX ORDER — CLINDAMYCIN PHOSPHATE 900 MG/50ML
900 INJECTION, SOLUTION INTRAVENOUS SEE ADMIN INSTRUCTIONS
Status: DISCONTINUED | OUTPATIENT
Start: 2019-04-18 | End: 2019-04-18

## 2019-04-18 RX ORDER — POTASSIUM CHLORIDE 7.45 MG/ML
10 INJECTION INTRAVENOUS
Status: DISCONTINUED | OUTPATIENT
Start: 2019-04-18 | End: 2019-04-24

## 2019-04-18 RX ORDER — LIDOCAINE 40 MG/G
CREAM TOPICAL
Status: DISCONTINUED | OUTPATIENT
Start: 2019-04-18 | End: 2019-04-30 | Stop reason: HOSPADM

## 2019-04-18 RX ORDER — POTASSIUM CHLORIDE 1.5 G/1.58G
20-40 POWDER, FOR SOLUTION ORAL
Status: DISCONTINUED | OUTPATIENT
Start: 2019-04-18 | End: 2019-04-24

## 2019-04-18 RX ORDER — CHLORHEXIDINE GLUCONATE ORAL RINSE 1.2 MG/ML
15 SOLUTION DENTAL EVERY 12 HOURS
Status: DISCONTINUED | OUTPATIENT
Start: 2019-04-18 | End: 2019-04-20 | Stop reason: CLARIF

## 2019-04-18 RX ORDER — LIDOCAINE HYDROCHLORIDE 20 MG/ML
INJECTION, SOLUTION INFILTRATION; PERINEURAL PRN
Status: DISCONTINUED | OUTPATIENT
Start: 2019-04-18 | End: 2019-04-18

## 2019-04-18 RX ORDER — NALOXONE HYDROCHLORIDE 0.4 MG/ML
.1-.4 INJECTION, SOLUTION INTRAMUSCULAR; INTRAVENOUS; SUBCUTANEOUS
Status: DISCONTINUED | OUTPATIENT
Start: 2019-04-18 | End: 2019-04-18

## 2019-04-18 RX ORDER — MAGNESIUM SULFATE HEPTAHYDRATE 40 MG/ML
4 INJECTION, SOLUTION INTRAVENOUS EVERY 4 HOURS PRN
Status: DISCONTINUED | OUTPATIENT
Start: 2019-04-18 | End: 2019-04-30 | Stop reason: HOSPADM

## 2019-04-18 RX ORDER — VANCOMYCIN HYDROCHLORIDE 1 G/20ML
INJECTION, POWDER, LYOPHILIZED, FOR SOLUTION INTRAVENOUS PRN
Status: DISCONTINUED | OUTPATIENT
Start: 2019-04-18 | End: 2019-04-18 | Stop reason: HOSPADM

## 2019-04-18 RX ORDER — NICOTINE POLACRILEX 4 MG
15-30 LOZENGE BUCCAL
Status: DISCONTINUED | OUTPATIENT
Start: 2019-04-18 | End: 2019-04-18

## 2019-04-18 RX ORDER — CLINDAMYCIN PHOSPHATE 900 MG/50ML
900 INJECTION, SOLUTION INTRAVENOUS
Status: DISCONTINUED | OUTPATIENT
Start: 2019-04-18 | End: 2019-04-18

## 2019-04-18 RX ORDER — ACETAMINOPHEN 325 MG/1
650 TABLET ORAL EVERY 4 HOURS PRN
Status: DISCONTINUED | OUTPATIENT
Start: 2019-04-21 | End: 2019-04-25

## 2019-04-18 RX ORDER — POTASSIUM CL/LIDO/0.9 % NACL 10MEQ/0.1L
10 INTRAVENOUS SOLUTION, PIGGYBACK (ML) INTRAVENOUS
Status: DISCONTINUED | OUTPATIENT
Start: 2019-04-18 | End: 2019-04-24

## 2019-04-18 RX ORDER — NOREPINEPHRINE BITARTRATE/D5W 16MG/250ML
0.03-0.4 PLASTIC BAG, INJECTION (ML) INTRAVENOUS CONTINUOUS
Status: DISCONTINUED | OUTPATIENT
Start: 2019-04-18 | End: 2019-04-20

## 2019-04-18 RX ORDER — PANTOPRAZOLE SODIUM 40 MG/1
40 TABLET, DELAYED RELEASE ORAL DAILY
Status: DISCONTINUED | OUTPATIENT
Start: 2019-04-18 | End: 2019-04-19

## 2019-04-18 RX ORDER — SODIUM CHLORIDE, SODIUM LACTATE, POTASSIUM CHLORIDE, CALCIUM CHLORIDE 600; 310; 30; 20 MG/100ML; MG/100ML; MG/100ML; MG/100ML
INJECTION, SOLUTION INTRAVENOUS CONTINUOUS
Status: DISCONTINUED | OUTPATIENT
Start: 2019-04-18 | End: 2019-04-18 | Stop reason: HOSPADM

## 2019-04-18 RX ORDER — GADOBUTROL 604.72 MG/ML
10 INJECTION INTRAVENOUS ONCE
Status: DISCONTINUED | OUTPATIENT
Start: 2019-04-18 | End: 2019-04-23

## 2019-04-18 RX ORDER — POTASSIUM CHLORIDE 1500 MG/1
20-40 TABLET, EXTENDED RELEASE ORAL
Status: DISCONTINUED | OUTPATIENT
Start: 2019-04-18 | End: 2019-04-24

## 2019-04-18 RX ORDER — PROPOFOL 10 MG/ML
5-75 INJECTION, EMULSION INTRAVENOUS CONTINUOUS
Status: DISCONTINUED | OUTPATIENT
Start: 2019-04-18 | End: 2019-04-18

## 2019-04-18 RX ORDER — GADOBUTROL 604.72 MG/ML
7 INJECTION INTRAVENOUS ONCE
Status: COMPLETED | OUTPATIENT
Start: 2019-04-18 | End: 2019-04-18

## 2019-04-18 RX ORDER — ESMOLOL HYDROCHLORIDE 10 MG/ML
INJECTION INTRAVENOUS PRN
Status: DISCONTINUED | OUTPATIENT
Start: 2019-04-18 | End: 2019-04-18

## 2019-04-18 RX ORDER — HYDROMORPHONE HYDROCHLORIDE 1 MG/ML
.3-.5 INJECTION, SOLUTION INTRAMUSCULAR; INTRAVENOUS; SUBCUTANEOUS
Status: DISCONTINUED | OUTPATIENT
Start: 2019-04-18 | End: 2019-04-25

## 2019-04-18 RX ORDER — SODIUM CHLORIDE 9 MG/ML
INJECTION, SOLUTION INTRAVENOUS CONTINUOUS
Status: DISCONTINUED | OUTPATIENT
Start: 2019-04-18 | End: 2019-04-22

## 2019-04-18 RX ORDER — MAGNESIUM SULFATE HEPTAHYDRATE 40 MG/ML
2 INJECTION, SOLUTION INTRAVENOUS DAILY PRN
Status: DISCONTINUED | OUTPATIENT
Start: 2019-04-18 | End: 2019-04-30 | Stop reason: HOSPADM

## 2019-04-18 RX ORDER — PROPOFOL 10 MG/ML
5-75 INJECTION, EMULSION INTRAVENOUS CONTINUOUS
Status: DISCONTINUED | OUTPATIENT
Start: 2019-04-18 | End: 2019-04-20

## 2019-04-18 RX ADMIN — PROPOFOL 10 MCG/KG/MIN: 10 INJECTION, EMULSION INTRAVENOUS at 17:17

## 2019-04-18 RX ADMIN — POTASSIUM CHLORIDE 20 MEQ: 400 INJECTION, SOLUTION INTRAVENOUS at 19:47

## 2019-04-18 RX ADMIN — NOREPINEPHRINE BITARTRATE 0.18 MCG/KG/MIN: 1 INJECTION INTRAVENOUS at 17:53

## 2019-04-18 RX ADMIN — PHENYLEPHRINE HYDROCHLORIDE 100 MCG: 10 INJECTION, SOLUTION INTRAMUSCULAR; INTRAVENOUS; SUBCUTANEOUS at 15:24

## 2019-04-18 RX ADMIN — PHENYLEPHRINE HYDROCHLORIDE 100 MCG: 10 INJECTION, SOLUTION INTRAMUSCULAR; INTRAVENOUS; SUBCUTANEOUS at 15:05

## 2019-04-18 RX ADMIN — ESMOLOL HYDROCHLORIDE 20 MG: 10 INJECTION, SOLUTION INTRAVENOUS at 15:18

## 2019-04-18 RX ADMIN — HYDROMORPHONE HYDROCHLORIDE 0.2 MG: 1 INJECTION, SOLUTION INTRAMUSCULAR; INTRAVENOUS; SUBCUTANEOUS at 06:37

## 2019-04-18 RX ADMIN — SODIUM CHLORIDE, POTASSIUM CHLORIDE, SODIUM LACTATE AND CALCIUM CHLORIDE: 600; 310; 30; 20 INJECTION, SOLUTION INTRAVENOUS at 15:54

## 2019-04-18 RX ADMIN — ESMOLOL HYDROCHLORIDE 20 MG: 10 INJECTION, SOLUTION INTRAVENOUS at 15:26

## 2019-04-18 RX ADMIN — ALBUMIN HUMAN: 0.05 INJECTION, SOLUTION INTRAVENOUS at 15:23

## 2019-04-18 RX ADMIN — PHENYLEPHRINE HYDROCHLORIDE 0.5 MCG/KG/MIN: 10 INJECTION, SOLUTION INTRAMUSCULAR; INTRAVENOUS; SUBCUTANEOUS at 15:10

## 2019-04-18 RX ADMIN — ROCURONIUM BROMIDE 40 MG: 10 INJECTION INTRAVENOUS at 15:03

## 2019-04-18 RX ADMIN — SODIUM CHLORIDE: 9 INJECTION, SOLUTION INTRAVENOUS at 16:13

## 2019-04-18 RX ADMIN — PROPOFOL 40 MG: 10 INJECTION, EMULSION INTRAVENOUS at 15:03

## 2019-04-18 RX ADMIN — PHENYLEPHRINE HYDROCHLORIDE 100 MCG: 10 INJECTION, SOLUTION INTRAMUSCULAR; INTRAVENOUS; SUBCUTANEOUS at 15:07

## 2019-04-18 RX ADMIN — SODIUM CHLORIDE, POTASSIUM CHLORIDE, SODIUM LACTATE AND CALCIUM CHLORIDE 500 ML: 600; 310; 30; 20 INJECTION, SOLUTION INTRAVENOUS at 22:30

## 2019-04-18 RX ADMIN — HYDROMORPHONE HYDROCHLORIDE 0.2 MG: 1 INJECTION, SOLUTION INTRAMUSCULAR; INTRAVENOUS; SUBCUTANEOUS at 12:28

## 2019-04-18 RX ADMIN — ESMOLOL HYDROCHLORIDE 20 MG: 10 INJECTION, SOLUTION INTRAVENOUS at 15:49

## 2019-04-18 RX ADMIN — FENTANYL CITRATE 25 MCG: 50 INJECTION, SOLUTION INTRAMUSCULAR; INTRAVENOUS at 15:50

## 2019-04-18 RX ADMIN — SODIUM CHLORIDE: 9 INJECTION, SOLUTION INTRAVENOUS at 19:28

## 2019-04-18 RX ADMIN — DEXMEDETOMIDINE HYDROCHLORIDE 4 MCG: 100 INJECTION, SOLUTION INTRAVENOUS at 15:26

## 2019-04-18 RX ADMIN — GADOBUTROL 7 ML: 604.72 INJECTION INTRAVENOUS at 11:53

## 2019-04-18 RX ADMIN — FENTANYL CITRATE 25 MCG: 50 INJECTION, SOLUTION INTRAMUSCULAR; INTRAVENOUS at 15:03

## 2019-04-18 RX ADMIN — ESMOLOL HYDROCHLORIDE 20 MG: 10 INJECTION, SOLUTION INTRAVENOUS at 16:05

## 2019-04-18 RX ADMIN — FENTANYL CITRATE 25 MCG: 50 INJECTION, SOLUTION INTRAMUSCULAR; INTRAVENOUS at 15:41

## 2019-04-18 RX ADMIN — ESMOLOL HYDROCHLORIDE 20 MG: 10 INJECTION, SOLUTION INTRAVENOUS at 15:22

## 2019-04-18 RX ADMIN — SODIUM CHLORIDE, POTASSIUM CHLORIDE, SODIUM LACTATE AND CALCIUM CHLORIDE 500 ML: 600; 310; 30; 20 INJECTION, SOLUTION INTRAVENOUS at 19:59

## 2019-04-18 RX ADMIN — SODIUM CHLORIDE, POTASSIUM CHLORIDE, SODIUM LACTATE AND CALCIUM CHLORIDE 1000 ML: 600; 310; 30; 20 INJECTION, SOLUTION INTRAVENOUS at 17:13

## 2019-04-18 RX ADMIN — DEXMEDETOMIDINE HYDROCHLORIDE 4 MCG: 100 INJECTION, SOLUTION INTRAVENOUS at 15:29

## 2019-04-18 RX ADMIN — ESMOLOL HYDROCHLORIDE 20 MG: 10 INJECTION, SOLUTION INTRAVENOUS at 15:03

## 2019-04-18 RX ADMIN — FENTANYL CITRATE 25 MCG: 50 INJECTION, SOLUTION INTRAMUSCULAR; INTRAVENOUS at 16:23

## 2019-04-18 RX ADMIN — ASPIRIN 300 MG: 300 SUPPOSITORY RECTAL at 10:02

## 2019-04-18 RX ADMIN — HYDROMORPHONE HYDROCHLORIDE 0.3 MG: 1 INJECTION, SOLUTION INTRAMUSCULAR; INTRAVENOUS; SUBCUTANEOUS at 18:04

## 2019-04-18 RX ADMIN — CEFAZOLIN SODIUM 2 G: 2 INJECTION, SOLUTION INTRAVENOUS at 17:50

## 2019-04-18 RX ADMIN — DEXMEDETOMIDINE HYDROCHLORIDE 0.4 MCG/KG/HR: 100 INJECTION, SOLUTION INTRAVENOUS at 15:48

## 2019-04-18 RX ADMIN — SODIUM CHLORIDE, POTASSIUM CHLORIDE, SODIUM LACTATE AND CALCIUM CHLORIDE: 600; 310; 30; 20 INJECTION, SOLUTION INTRAVENOUS at 14:57

## 2019-04-18 RX ADMIN — LIDOCAINE HYDROCHLORIDE 80 MG: 20 INJECTION, SOLUTION INFILTRATION; PERINEURAL at 15:03

## 2019-04-18 RX ADMIN — HYDROMORPHONE HYDROCHLORIDE 0.2 MG: 1 INJECTION, SOLUTION INTRAMUSCULAR; INTRAVENOUS; SUBCUTANEOUS at 03:32

## 2019-04-18 RX ADMIN — LIDOCAINE HYDROCHLORIDE 3 ML: 10 INJECTION, SOLUTION EPIDURAL; INFILTRATION; INTRACAUDAL; PERINEURAL at 11:10

## 2019-04-18 RX ADMIN — LORAZEPAM 1 MG: 2 INJECTION INTRAMUSCULAR; INTRAVENOUS at 10:29

## 2019-04-18 RX ADMIN — ESMOLOL HYDROCHLORIDE 30 MG: 10 INJECTION, SOLUTION INTRAVENOUS at 15:12

## 2019-04-18 RX ADMIN — ESMOLOL HYDROCHLORIDE 20 MG: 10 INJECTION, SOLUTION INTRAVENOUS at 15:41

## 2019-04-18 RX ADMIN — CHLORHEXIDINE GLUCONATE 15 ML: 1.2 RINSE ORAL at 20:12

## 2019-04-18 RX ADMIN — CEFAZOLIN SODIUM 2 G: 2 INJECTION, SOLUTION INTRAVENOUS at 08:28

## 2019-04-18 RX ADMIN — CEFAZOLIN SODIUM 2 G: 2 INJECTION, SOLUTION INTRAVENOUS at 00:43

## 2019-04-18 RX ADMIN — HYDROMORPHONE HYDROCHLORIDE 0.2 MG: 1 INJECTION, SOLUTION INTRAMUSCULAR; INTRAVENOUS; SUBCUTANEOUS at 09:42

## 2019-04-18 ASSESSMENT — ACTIVITIES OF DAILY LIVING (ADL)
ADLS_ACUITY_SCORE: 18
ADLS_ACUITY_SCORE: 16

## 2019-04-18 ASSESSMENT — ENCOUNTER SYMPTOMS: DYSRHYTHMIAS: 1

## 2019-04-18 ASSESSMENT — MIFFLIN-ST. JEOR: SCORE: 1176.5

## 2019-04-18 NOTE — PROGRESS NOTES
"[facility], [facility]    Vascular Neurology Progress Note    Name: Belgica Klein  YOB: 1928  MRN: [id number]  Admission Date: 4/17/2019  Date of Service:04/18/2019   Hospital Day: 2                                             24 hour event summary:                                         -Refuses to follow commands.  -Not appropriate to work with therapies yet per notes.   -Febrile, Blood cultures positive for MSSA -on Ancef, ID consulted.  -Straight cath due to voiding issues -pvr 215  -Fecal incontinence   -Still with hip pain -IV dilaudid helped a bit, Ortho consulted.  -Hip joint aspiration with concepcion pus.  -MRI completed   -To OR for wash out.        Physical Examination:     /57 (BP Location: Right arm)   Pulse 115   Temp 99.3  F (37.4  C) (Axillary)   Resp 18   Ht 1.727 m (5' 8\")   Wt 70.8 kg (156 lb 1.4 oz)   SpO2 92%   BMI 23.73 kg/m      Neurologic  Mental Status: lethargic, Intermittently agitated/confused and needs repeated redirection to follow commands. Mild dysarthria, refuses to participate with neuro exam  Cranial Nerves: PERRL, facial movements symmetric to grimace, hearing not formally tested but intact to conversation, tongue protrusion midline, Has a mild right gaze preference with suspected visual neglect/left homonymous hemianopia  Motor: Could not clearly assess pronator drift in the left upper extremity due to shoulder injury,  strength appears to be intact, left hip limited examination due to chronic pain.  Reflexes: no clonus  Sensory: withdraws all extremities to pain, less on the LLE   Coordination: Unable to assess due to agitation  Station/Gait: unable to test    Medications:     Scheduled Meds:    [Auto Hold] aspirin  325 mg Oral Daily    Or     [Auto Hold] aspirin  300 mg Rectal Daily     [Auto Hold] atorvastatin  40 mg Oral or NG Tube Daily at 8 pm     [Auto Hold] ceFAZolin  2 g Intravenous Q8H     [Auto " Hold] gadobutrol  10 mL Intravenous Once     PRN Meds: [Auto Hold] acetaminophen, [Auto Hold] acetaminophen, [Auto Hold] benztropine, [Auto Hold] HYDROmorphone, [Auto Hold] labetalol, [Auto Hold] LORazepam, - MEDICATION INSTRUCTIONS -, [Auto Hold] melatonin, [Auto Hold] naloxone, [Auto Hold] OLANZapine zydis, [Auto Hold] ondansetron **OR** [Auto Hold] ondansetron, [Auto Hold] potassium chloride, [Auto Hold] potassium chloride with lidocaine, [Auto Hold] potassium chloride, [Auto Hold] potassium chloride, [Auto Hold] potassium chloride, [Auto Hold] senna-docusate **OR** [Auto Hold] senna-docusate, sodium chloride (PF)    Data:     Recent Labs   Lab Test 04/17/19  0805   WBC 14.7*   RBC 3.65*   HGB 11.4*   HCT 31.6*        Recent Labs   Lab Test 04/18/19  1253 04/17/19  0805    129*   POTASSIUM 3.7 3.9   CHLORIDE 105 96   CO2 20 22   BUN 24 25   CR 0.67 0.84   * 136*   LULU 8.8 9.7     Recent Labs   Lab Test 04/17/19  1818 04/17/19  0805   PROTTOTAL 6.4* 7.4   ALBUMIN 2.5* 3.0*   BILITOTAL 0.8 0.8   ALKPHOS 122 128   AST 26 23   ALT 29 30     Recent Labs   Lab Test 04/17/19  0805   INR 1.20*     Recent Labs   Lab Test 04/17/19  1818   CHOL 126   HDL 22*   LDL 81   TRIG 117     Recent Labs   Lab Test 04/17/19  1818   A1C 6.1*     CRP: 231  Blood cultures x 4: Gram positive cocci in clusters     Imaging/ workup:     CT head without contrast: 4/17/2019   A 2-3 cm area of hypoattenuation and gray-white differentiation loss involving the mid right postcentral gyrus extending into the right parietal white matter. This is suspicious for area of acute/subacute ischemia.    CTA head and neck with contrast: 4/17/2019   CTA HEAD:  1. Patent intracranial circulation.  2. 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   CTA NECK:  Severe atherosclerotic plaquing at the bilateral carotid  bifurcations with approximately 20-30% narrowing of the proximal right internal carotid artery and approximately 40-50% narrowing of the proximal left internal carotid artery.    MRI brain +/- contrast: 4/18/2019   1. Numerous scattered infarcts involving the cerebral and cerebellar hemispheres bilaterally and basal ganglia bilaterally consistent with embolic infarcts from a central embolic source.  2. Diffuse cerebral volume loss and cerebral white matter changes consistent with chronic small vessel ischemic disease    TTE: 04/17/2019   1. The left ventricle is normal in structure, function and size. The visual ejection fraction is estimated at 55%.  2. The right ventricle is normal in structure, function and size.  3. There is mild to moderate mitral stenosis. Mean 8mmHg.  4. There is mild (1+) tricuspid regurgitation. The right ventricular systolic pressure is approximated at 42mmHg plus the right atrial pressure.  5. Dilation of the inferior vena cava is present with abnormal respiratory variation in diameter.  6. There is no atrial shunt seen. A contrast injection (Bubble Study) was performed that was negative for flow across the interatrial septum.    Assessment and Plan:                                           Belgica Klein is a 89 year old female from [facility] with a history of hypertension, osteoarthritis of R knee s/p kenalog injection 4/16/19, and L total hip arthroplasty, suspected TIA with aphasia in 12/2018, and reoccurring hyponatremia, who presented with her daughter for confusion, inability to follow commands, and nonsensical communication. CT on 4/17/19 consistent with Right postcentral gyrus infarct. MRI delayed due to agitation. Pt also found to have fever to 101.3, leukocytosis to 14.7 and blood cultures positive for MSSA - LUTHER with bubble study negative. EKG notable for abnormal rhythm. Next day 4/18/19 pt found to have septic left hip joint with concepcion pus and taken to OR for washout.  MRI completed in the interim and showed numerous acute/subacute infarcts - suspect embolic source from either new onset atrial fibrillation vs septic embolic from vegetations/ MSSA bacteremia    #Numerous acute/subacute infarcts - suspect septic emboli given septic Left hip vs new onset atrial fibrillation   - Neurochecks every 4 hours  - Continue Aspirin 325 mg  - Statin: Lipitor 40 mg daily  - Will need to evaluate for vegetations with either LUTHER or cardiac MRI   - Would recommend to hold off on anticoagulation until vegetations are ruled out due to higher risk of hemorrhagic conversion  - Telemetry monitoring  - PT/OT/SLP  - PM&R  - Stroke Education     #Hyponatremia  - As per daughter, patient has had multiple episodes of hyponatremia in the past  - These are associated with confusion  - Management as per hospitalist    #New onset atrial fibrillation  - Rate control for now    #Suspected septic joint  - Ortho consulted  - Plan for wash out today      Patient Follow-up    - Final recommendation pending work-up    Lamin Shahid MS3    Resident/Fellow Attestation   I, Valeri Hess, was present with the medical student who participated in the service and in the documentation of the note.  I have verified the history and personally performed the physical exam and medical decision making.  I agree with the assessment and plan of care as documented in the note.      We will follow along closely, please call us with any questions or concerns, case seen and discussed with Dr Lupe Hess MD  Vascular Neurology fellow  Pager # [phone]

## 2019-04-18 NOTE — PROVIDER NOTIFICATION
Call received regarding positive blood cultures from L arm containing staph aureus. Hospitalist paged.

## 2019-04-18 NOTE — PLAN OF CARE
"Alert. D/Ox3. Unable to assess full neuro, pt does not follow command. Shuts eyes close and says \"no\" every request. Pt is sensitive to touch and screams and moan in pain when being touched or repositioned. VSS, temp improved with tylenol suppository. Tele SR/SD with BBB. NPO pending speech eval. Bedrest overnight. Incontinent of stool. Pt is unable to void, was straight cath for second times overnight. 0630 bladder scan was 215. IV dilaudid given for pain with relief. Pt had positive blood cultures and was started on antibiotics. Plan for MRI and hip aspiration today, discharge pending.   "

## 2019-04-18 NOTE — ANESTHESIA PREPROCEDURE EVALUATION
Anesthesia Pre-Procedure Evaluation    Patient: Belgica Klein   MRN: [id number] : 8/3/1928          Preoperative Diagnosis: SEPTIC HIP.    Procedure(s):  COMBINED IRRIGATION AND DEBRIDEMENT LEFT HIP.    No past medical history on file.  No past surgical history on file.    Anesthesia Evaluation     .             ROS/MED HX    ENT/Pulmonary:      (-) sleep apnea   Neurologic:     (+)CVA date:  with deficits- aphasia;, TIA     Cardiovascular: Comment: Afib with RVR;    (+) Dyslipidemia, hypertension----. : . . . :. dysrhythmias a-fib, . Previous cardiac testing Echodate:2019results:Interpretation Summary     1. The left ventricle is normal in structure, function and size. The visual  ejection fraction is estimated at 55%.  2. The right ventricle is normal in structure, function and size.  3. There is mild to moderate mitral stenosis. Mean 8mmHg.  4. There is mild (1+) tricuspid regurgitation. The right ventricular systolic  pressure is approximated at 42mmHg plus the right atrial pressure.  5. Dilation of the inferior vena cava is present with abnormal respiratory  variation in diameter.  6. There is no atrial shunt seen. A contrast injection (Bubble Study) was  performed that was negative for flow across the interatrial septum.     No changes from outside echo report from 2018.date: results: date: results: date: results:          METS/Exercise Tolerance:     Hematologic:         Musculoskeletal:         GI/Hepatic:        (-) GERD   Renal/Genitourinary:         Endo:         Psychiatric:         Infectious Disease:         Malignancy:         Other:                          Physical Exam  Normal systems: dental    Airway   Mallampati: III  TM distance: >3 FB  Neck ROM: limited    Dental     Cardiovascular   Rhythm and rate: irregular and abnormal      Pulmonary    breath sounds clear to auscultation            Lab Results   Component Value Date    WBC 14.7 (H) 2019    HGB 11.4 (L) 2019    HCT  "31.6 (L) 04/17/2019     04/17/2019    .0 (H) 04/17/2019    SED 80 (H) 04/18/2019     04/18/2019    POTASSIUM 3.7 04/18/2019    CHLORIDE 105 04/18/2019    CO2 20 04/18/2019    BUN 24 04/18/2019    CR 0.67 04/18/2019     (H) 04/18/2019    LULU 8.8 04/18/2019    ALBUMIN 2.5 (L) 04/17/2019    PROTTOTAL 6.4 (L) 04/17/2019    ALT 29 04/17/2019    AST 26 04/17/2019    ALKPHOS 122 04/17/2019    BILITOTAL 0.8 04/17/2019    LIPASE 77 04/17/2019    PTT 35 04/17/2019    INR 1.20 (H) 04/17/2019       Preop Vitals  BP Readings from Last 3 Encounters:   04/18/19 107/56    Pulse Readings from Last 3 Encounters:   04/17/19 115      Resp Readings from Last 3 Encounters:   04/18/19 14    SpO2 Readings from Last 3 Encounters:   04/18/19 97%      Temp Readings from Last 1 Encounters:   04/18/19 37.4  C (99.3  F) (Axillary)    Ht Readings from Last 1 Encounters:   04/17/19 1.727 m (5' 8\")      Wt Readings from Last 1 Encounters:   04/18/19 70.8 kg (156 lb 1.4 oz)    Estimated body mass index is 23.73 kg/m  as calculated from the following:    Height as of this encounter: 1.727 m (5' 8\").    Weight as of this encounter: 70.8 kg (156 lb 1.4 oz).       Anesthesia Plan      History & Physical Review  History and physical reviewed and following examination; no interval change.    ASA Status:  4 emergent.    NPO Status:  > 8 hours    Plan for General and ETT with Intravenous induction. Maintenance will be Balanced.    PONV prophylaxis:  Ondansetron (or other 5HT-3)  Additional equipment: Arterial Line, 2nd IV and Central Line She arrived in pre-op in Afib with RVR and hypotensive; this was a new finding; Discussed with hospitalist and surgeon; not sure if she would be better if we waited for Afib to be treated vs. The infection causing the Afib and surgery would be therapeutic - discussed with family who opted to proceed with the surgery as there is no guarantee that waiting would have any benefit, but surgery at this " time could; discussed risks including but not limited to prolonged intubation, major cardiac event and death; family still wishes to proceed;      Postoperative Care  Postoperative pain management:  IV analgesics.      Consents  Anesthetic plan, risks, benefits and alternatives discussed with:  Patient and Daughter/Son..                 APRIL KEEN MD

## 2019-04-18 NOTE — PROVIDER NOTIFICATION
MD Notification    Notified Person: MD Ortiz     Notified Person Name:     Notification Date/Time: 4/18/19 1002    Notification Interaction: text page    Purpose of Notification: Seferino Luque, lab just notified of a critical. Positive blood culture from right arm draw from last night. Growing gram positive cocci and cluster.     Orders Received:    Comments:

## 2019-04-18 NOTE — PHARMACY-VANCOMYCIN DOSING SERVICE
Pharmacy Vancomycin Initial Note  Date of Service 2019  Patient's  8/3/1928  89 year old, female    Indication: Sepsis    Current estimated CrCl = Estimated Creatinine Clearance: 51.4 mL/min (based on SCr of 0.84 mg/dL).    Creatinine for last 3 days  2019:  8:05 AM Creatinine 0.84 mg/dL    Recent Vancomycin Level(s) for last 3 days  No results found for requested labs within last 72 hours.      Vancomycin IV Administrations (past 72 hours)      No vancomycin orders with administrations in past 72 hours.                Nephrotoxins and other renal medications (From now, onward)    Start     Dose/Rate Route Frequency Ordered Stop    19 2300  vancomycin (VANCOCIN) 1,500 mg in sodium chloride 0.9 % 250 mL intermittent infusion      1,500 mg  over 90 Minutes Intravenous EVERY 24 HOURS 19 2221      19 2145  piperacillin-tazobactam (ZOSYN) 3.375 g vial to attach to  mL bag     Note to Pharmacy:  Pharmacy can adjust dose based on renal function.    3.375 g  over 30 Minutes Intravenous EVERY 6 HOURS 19 2135            Contrast Orders - past 72 hours (72h ago, onward)    Start     Dose/Rate Route Frequency Ordered Stop    19 0818  iopamidol (ISOVUE-370) solution 120 mL      120 mL Intravenous ONCE 19 0817 19 0838                Plan:  1.  Start vancomycin  1500 mg IV q24h.   2.  Goal Trough Level: 15-20 mg/L   3.  Pharmacy will check trough levels as appropriate in 3-5 Days.    4. Serum creatinine levels will be ordered daily for the first week of therapy and at least twice weekly for subsequent weeks.   Zay pt is also on zosyn  5. [place] method utilized to dose vancomycin therapy: Method 1    Unique Sorenson, PharmD

## 2019-04-18 NOTE — PLAN OF CARE
PT Note 4/18/2019 10:16 AM    PT dalila attempted. Spoke with RN who requested PT to re-attempt in the afternoon. Pt is not appropriate for an assessment at this time due to severe pain in the hip. Pt is scheduled to have a hip aspiration around 11am today.

## 2019-04-18 NOTE — PROGRESS NOTES
SPIRITUAL HEALTH SERVICES  Spiritual Assessment Progress Note  [facility]    received a phone call from pt's son Jatin Klein (lives in [place])  who is requesting that pt be anointed.  Son was informed that our regular  is out ill and that we have an on-call  for emergencies only.  I also noted that pt was already in surgery.  Son was informed that we would keep an eye on pt after surgery and I would notify on-call .  Son said he was driving up to [place] and would be here tomorrow.     called on-call .  He is planning on anointing pt on Saturday morning. IF PT DECLINES, CALL HIM IN  SOONER.       will continue to follow.                                                                                                                                              Renay Greer M.Div., [facility]  Staff    Pager [phone]

## 2019-04-18 NOTE — PLAN OF CARE
SLP: Orders received for swallow evaluation and chart reviewed. Per discussion with RN Pt is not appropriate for PO trials at this time d/t lethargy from medication and decreased ability to follow commands.     SLP: Re-attempted evaluation this pm. Per RN, Pt scheduled for surgery. Will re-schedule for tomorrow (4/19).

## 2019-04-18 NOTE — ANESTHESIA PROCEDURE NOTES
ARTERIAL LINE PROCEDURE NOTE:   Pre-Procedure  Performed by Cem Simons MD  Location: pre-op      Pre-Anesthestic Checklist: patient identified, IV checked, risks and benefits discussed, informed consent, monitors and equipment checked and pre-op evaluation    Timeout  Correct Patient: Yes   Correct Procedure: Yes   Correct Site: Yes   Correct Laterality: N/A   Correct Position: Yes   Site Marked: N/A   .   Procedure Documentation  Procedure: arterial line    Supine  Insertion Site:left, radial.Skin infiltrated with mL of 1% lidocaine. Injection technique: Seldinger Technique  .  .  Patient Prep;chlorhexidine gluconate and isopropyl alcohol, patient draped    Assessment/Narrative    Catheter: 20 gauge, 12 cm     Secured by suture  Tegaderm dressing used.        Comments:  No complications.

## 2019-04-18 NOTE — ANESTHESIA CARE TRANSFER NOTE
Patient: Belgica Klein    Procedure(s):  COMBINED IRRIGATION AND DEBRIDEMENT LEFT HIP.    Diagnosis: SEPTIC HIP.  Diagnosis Additional Information: No value filed.    Anesthesia Type:   General, ETT     Note:  Airway :ETT  Patient transferred to:ICU  Comments: Patient connected to SpO2, EKG, and arterial blood pressure transport monitors and accompanied by CRNA, anesthesiologist, circulating RN to ICU room. Patient ventilated by anesthesiologist with ambu via ETT with O2 at 15 liters per minute during transport.     On arrival to ICU, endotracheal tube position unchanged, equal, bilateral breath sounds auscultated in ICU room, patient placed on ICU ventilator by respiratory therapist, teeth and oral mucosa intact and unchanged at handoff of care. At anesthesia handoff of care, clinical monitors and alarms on and functioning, report on patient's clinical status given to ICU RN, report on patient's clinical status given to intensivist, report on patient's clinical status given to RN, ICU staff questions answered.ICU Handoff: Call for PAUSE to initiate/utilize ICU HANDOFF, Identified Patient, Identified Responsible Provider, Reviewed the Pertinent Medical History, Discussed Surgical Course, Reviewed Intra-OP Anesthesia Management and Issues during Anesthesia, Set Expectations for Post Procedure Period and Allowed Opportunity for Questions and Acknowledgement of Understanding      Vitals: (Last set prior to Anesthesia Care Transfer)    CRNA VITALS  4/18/2019 1549 - 4/18/2019 1640      4/18/2019             Resp Rate (set):  10                Electronically Signed By: TAE Escobar CRNA  April 18, 2019  4:40 PM

## 2019-04-18 NOTE — PROGRESS NOTES
Cross cover:    Notified of blood culture from left arm with gram-positive cocci in clusters.  Patient febrile with persistent fever, T-max 101.3.  Tachycardic with heart rate in the 100-110s.  WBC elevated at 14.7.  Lactic acid normal.  Pro-calcitonin moderately elevated 0.61.  Source of infection unknown.  Reported left hip pain with plan for arthrocentesis, to be completed tomorrow.  --Given clinical signs for sepsis of unknown origin will start broad antibiotics with IV Zosyn and vancomycin.  --Repeat blood cultures  --Continue IV fluids, Tylenol, supportive cares.    John Aguilar PA-C    Addendum 2237: Patient's family at bedside reports patient has allergy to penicillins.  This was added to her allergy list.  Do not start Zosyn.  Start Ceftriaxone and IV vancomycin for empiric coverage.

## 2019-04-18 NOTE — ANESTHESIA PROCEDURE NOTES
CENTRAL LINE INSERTION PROCEDURE NOTE:   Pre-Procedure  Performed by Cem Simons MD  Location: OR    Procedure Times:4/18/2019 3:55 PM and 4/18/2019 4:10 PM  Pre-Anesthestic Checklist: patient identified, IV checked, risks and benefits discussed, informed consent, monitors and equipment checked and pre-op evaluation    Timeout  Correct Patient: Yes   Correct Procedure: Yes   Correct Site: Yes   Correct Laterality: N/A   Correct Position: Yes   Site Marked: N/A   .   Procedure Documentation   Procedure: central line  Position: Trendelenburg  Patient Prep;all elements of maximal sterile barrier technique followed, mask, hat, sterile gown, sterile gloves, draped, hand hygiene, chlorhexidine gluconate and isopropyl alcohol, patient draped      Insertion Site:right, internal jugular    Skin infiltrated with mL of 1% lidocaine.  Catheter: 7 Fr, 20 cm, T.L.  Assessment/Narrative         Secured by suture  Tegaderm and Biopatch dressing used.  blood aspirated from all lumens  All lumens flushed: Yes    Comments:  No complications.

## 2019-04-18 NOTE — PROGRESS NOTES
RADIOLOGY PROCEDURE NOTE  Patient name: Belgica Klein  MRN: [id number]  : 8/3/1928    Pre-procedure diagnosis: left hip pain and fever  Post-procedure diagnosis: Same    Procedure Date/Time: 2019  11:25 AM  Procedure: left hip aspiration  Estimated blood loss: None  Specimen(s) collected with description: 2mL pink-tinged white thick cloudy fluid  The patient tolerated the procedure well with no immediate complications.  Significant findings:none    See imaging dictation for procedural details.    Provider name: Mari Bustamante  Assistant(s):None

## 2019-04-18 NOTE — PROGRESS NOTES
X cover    bcx came back as MSSA.    Will order ancef, noted PCN allergies with hives.    Discontinue ceftriaxone and vancomycin.    ID consult.      Chalino Weston MD

## 2019-04-18 NOTE — ANESTHESIA POSTPROCEDURE EVALUATION
Patient: Belgica Klein    Procedure(s):  COMBINED IRRIGATION AND DEBRIDEMENT LEFT HIP.    Diagnosis:SEPTIC HIP.  Diagnosis Additional Information: No value filed.    Anesthesia Type:  General, ETT    Note:  Anesthesia Post Evaluation    Patient location during evaluation: ICU  Patient participation: Unable to evaluate secondary to administered sedation  Airway patency: patent  Cardiovascular status: hemodynamically stable  Respiratory status: acceptable  Hydration status: acceptable       Comments: Taken directly to ICU; intubated and sedated; precedex gtt for sedation; phenylepherine gtt to maintain BP; VSS; report given to nursing and intensivist;        Last vitals:  Vitals:    04/18/19 1403 04/18/19 1445 04/18/19 1450   BP: 107/56 99/44 127/50   Pulse:      Resp: 14 12 14   Temp:      SpO2:  96%          Electronically Signed By: APRIL KEEN MD  April 18, 2019  4:53 PM

## 2019-04-18 NOTE — PROVIDER NOTIFICATION
Notified on call OSIRIS Parson:    Pt has + blood cultures in L arm: Gram positive cocci in clusters.    Call back with new orders. IV antibiotics and repeat blood cultures.     Pt's family at bedside states pt is allergic to penicillin. Added to pt's allergies list and notified OSIRIS Parson.     Zosyn discontinued, continue Vancomycin.    Notified OSIRIS Parson at [phone]    FYI positive cultures from R arm: Gram positive cocci in clusters

## 2019-04-18 NOTE — PLAN OF CARE
OT/PT: Orders received. Pt in the OR. Will hold evaluations at this time. Please update activity orders as appropriate

## 2019-04-18 NOTE — PROGRESS NOTES
[facility]    Medicine Progress Note - Hospitalist Service        Date of Admission:  4/17/2019  7:56 AM    Assessment & Plan:   Belgica Klein is a 89-year-old female who presents to the Emergency Room with change in mental state and expressive aphasia.     Staph aureus bacteremia:  -Patient presenting with fever up to 101 at home and worsening left hip pain, concerns for septic left hip  -Blood culture growing staph aureus  -Continue IV Ancef 2 g every 8 hours 3 times daily  -Infectious disease consult today    Acute to subacute ischemic cerebrovascular accident involving right postcentral gyrus and right parietal lobe.   Acute encephalopathy   -The patient presents with expressive aphasia, some confusion and possibly left visual field cut.    -CT head was read as 2-3 cm area of hypoattenuation and gray-white differentiation loss involving the mid right postcentral gyrus extending into the right parietal white matter.   -Unable to do MRI of the brain yesterday as the patient would not lay still  -Discussed with neurology today, will reattempt MRI of the brain  -Physical therapy, occupational therapy and speech therapy as able.  -Echocardiogram reviewed, LV normal in structure function and size.  -Continue with aspirin 325 mg daily along with Lipitor 40 mg daily.    -Appears more confused this morning probably has some element of delirium-multifactorial given the acute stroke, bacteremia, pain.  -Delirium protocol, Zyprexa Zydis as needed    Left hip pain with concerns for septic left hip.  -The patient has had left arthroplasty about 30 years ago.  She has had ongoing pain, which has reportedly escalated in the last 3 days or so.    -Status post left hip joint aspiration this morning   -Await results of labs from joint aspirate including total count, differential count, Gram stain and culture.  -Concerns for septic left hip at this time  -IV antibiotics as above  -Orthopedic surgery  "following.    Hyponatremia.    -Sodium at presentation was 129.  The patient reportedly has had problems with low sodium in the recent past.  She was on hydrochlorothiazide and reportedly has been stopped for the past 3 days or so.  That could be one of the culprits.   -Continue to hold hydrochlorothiazide  -Recheck BMP tomorrow morning.    Benign essential hypertension.    -This will be managed as per acute stroke blood pressure management guidelines.    -Hold her prior to admission amlodipine and losartan     Hyperlipidemia.    -Continue Lipitor 40 mg daily when able to take orally.         Diet: NPO for Medical/Clinical Reasons Except for: No Exceptions     DVT Prophylaxis: Pneumatic Compression Devices   Aguilar Catheter: not present  Code Status: DNR/DNI     Disposition Plan    Expected discharge: 2 - 3 days, recommended to TBD  Entered: Chevy Li MD 04/18/2019, 9:35 AM        The patient's care was discussed with the Bedside Nurse, Patient and Patient's Family.    Chevy Li MD  Hospitalist Service  [facility]    ______________________________________________________________________    Interval History   Fever up to 101.3 last evening.  Blood culture now positive for staph aureus.  Continues to have left hip pain.  Status post aspiration of the left hip joint this morning.  More confused this morning.  Continues to be restless.    Data reviewed today: I reviewed all medications, new labs and imaging results over the last 24 hours. I personally reviewed no images or EKG's today.    Physical Exam   Vital signs:  Temp: 97.9  F (36.6  C) Temp src: Axillary BP: 140/77 Pulse: 115 Heart Rate: 108 Resp: 18 SpO2: 90 % O2 Device: None (Room air)   Height: 172.7 cm (5' 8\") Weight: 70.8 kg (156 lb 1.4 oz)  Estimated body mass index is 23.73 kg/m  as calculated from the following:    Height as of this encounter: 1.727 m (5' 8\").    Weight as of this encounter: 70.8 kg (156 lb 1.4 oz).      Wt " Readings from Last 2 Encounters:   04/18/19 70.8 kg (156 lb 1.4 oz)       Gen: Awake, oriented x1, more confused compared to yesterday, restless  HEENT:  no pallor  Resp: CTA B/L, normal WOB  CVS: RRR, no murmur  Abd/GI: Soft, non-tender. BS- normoactive.  No G/R/R  Skin: Warm, dry no rashes  MSK: Tender around the left hip area, restricted range of motion.  Neuro- CN- intact.  Appears to move all 4 extremities, difficult to accurately assess motor strength.    Data   Recent Labs   Lab 04/17/19  1818 04/17/19  0805   WBC  --  14.7*   HGB  --  11.4*   MCV  --  87   PLT  --  199   INR  --  1.20*   NA  --  129*   POTASSIUM  --  3.9   CHLORIDE  --  96   CO2  --  22   BUN  --  25   CR  --  0.84   ANIONGAP  --  11   LULU  --  9.7   GLC  --  136*   ALBUMIN 2.5* 3.0*   PROTTOTAL 6.4* 7.4   BILITOTAL 0.8 0.8   ALKPHOS 122 128   ALT 29 30   AST 26 23   LIPASE  --  77       Recent Results (from the past 24 hour(s))   XR Knee Right 3 Views    Narrative    RIGHT KNEE THREE VIEWS   4/17/2019 9:59 AM    HISTORY: Right knee pain.    COMPARISON: None.    FINDINGS: No fracture or acute abnormality is demonstrated. There is  moderate medial and lateral compartment joint space loss with mild  chondrocalcinosis. There are similar or slightly less extensive  degenerative changes of the patellofemoral joint. No other abnormality  is seen.      Impression    IMPRESSION: Degenerative changes as described above.     NAZARIO LEAVITT MD   XR Chest 2 Views    Narrative    CHEST TWO VIEWS  4/17/2019 9:59 AM    HISTORY: Cough.    COMPARISON: None.      Impression    IMPRESSION: The heart size is normal. There is a small left pleural  effusion. No right effusion or pneumothorax is seen. There are  surgical changes of the right shoulder with advanced degenerative  changes of the left shoulder. There is moderate diffuse vascular  prominence suggesting vascular congestion. There may be mild  atelectasis in the left lung base. The lungs are  otherwise clear. No  other abnormality is seen.     NAZARIO LEAVITT MD     Medications     - MEDICATION INSTRUCTIONS -       sodium chloride 100 mL/hr at 04/17/19 8335       aspirin  325 mg Oral Daily    Or     aspirin  300 mg Rectal Daily     atorvastatin  40 mg Oral or NG Tube Daily at 8 pm     ceFAZolin  2 g Intravenous Q8H     LORazepam  0.5-1 mg Intravenous Once         }

## 2019-04-18 NOTE — CONSULTS
Critical Care  Note      04/18/2019    Name: Belgica Klein MRN#: [id number]   Age: 89 year old YOB: 1928     \[facility]\""tl Day# 1  ICU DAY #0    MV DAY #0             Problem List:   Active Problems:    Acute CVA (cerebrovascular accident) (H)    Septic hip (H)           Summary/Hospital Course:     89F who presented 4/17 with expressive aphasia and encephalopathy, found to have MSSA bacteremia due to infection of a septic hip, now s/p washout by orthopedics on 4/18.  Found to have evidence of embolic strokes on MRI head.  Unable to obtain further history as pt is intubated/sedated.      Assessment and plan :     Belgica Klein IS a 89 year old female admitted on 4/17/2019 for MSSA bacteremia/septic hip.   I have personally reviewed the daily labs, imaging studies, cultures and discussed the case with referring physician and consulting physicians.     My assessment and plan by system for this patient is as follows:    Neurology/Psychiatry:   1.  Encepahlopathy:  Due to sepsis and recent strokes.  2.  Analgesia:  diluadid prn  3.  Sedation: propofol  4.  Embolic strokes:  Seen on mri.  No cause noted on TTE.  Plan for LUTHER in AM.    Cardiovascular:   1. Shock:  Septic.  S/p 30 ml/kg.  Norepi.   Ef 55%, nl R sided fxn.  Mild mitral stenosis.  Lactate ok 1.1.   2.  A fib with RVR:  Improved with volume.  K ok 3.7, check mag.    Pulmonary/Ventilator Management:   1. Keep intubated post-op for potential LUTHER tomorrow.    GI and Nutrition :   1. NPO for now.  2.  ppi for pud    Renal/Fluids/Electrolytes:   1. Creat ok 0.58.  2. Electrolytes ok.  Checking mag.  3. Acid/base status ok-- bicarb 20.    Infectious Disease:   1. MSSA bacteremia and septic hip:  Appreciate ID input.  Continue ancef.    Endocrine:   1. fsg acceptable for now.  Prn insulin vs glucose.    Hematology/Oncology:   1. Wbc 10.6 ok.  2.  hgb stable at 10.9.  3.  pltlts ok 137.     IV/Access:   1. Venous access - TLC  2. Arterial access -  A-line    ICU  Prophylaxis:   1. DVT: mechanical, chemo when cleared by ortho  2. VAP: HOB 30 degrees, chlorhexidine rinse  3. Stress Ulcer: PPI  4. Restraints: Nonviolent soft two point restraints required and necessary for patient safety and continued cares and good effect as patient continues to pull at necessary lines, tubes despite education and distraction. Will readdress daily.   5. Wound care  -   6. Feeding - npo for now  7. Family Update: daughter at bedside.  She is ok with patient remaining intubated.  Remains dnr.  8. Disposition - icu           Medical History:     No past medical history on file.  No past surgical history on file.  Social History     Socioeconomic History     Marital status:      Spouse name: Not on file     Number of children: Not on file     Years of education: Not on file     Highest education level: Not on file   Occupational History     Not on file   Social Needs     Financial resource strain: Not on file     Food insecurity:     Worry: Not on file     Inability: Not on file     Transportation needs:     Medical: Not on file     Non-medical: Not on file   Tobacco Use     Smoking status: Not on file   Substance and Sexual Activity     Alcohol use: Not on file     Drug use: Not on file     Sexual activity: Not on file   Lifestyle     Physical activity:     Days per week: Not on file     Minutes per session: Not on file     Stress: Not on file   Relationships     Social connections:     Talks on phone: Not on file     Gets together: Not on file     Attends Sikhism service: Not on file     Active member of club or organization: Not on file     Attends meetings of clubs or organizations: Not on file     Relationship status: Not on file     Intimate partner violence:     Fear of current or ex partner: Not on file     Emotionally abused: Not on file     Physically abused: Not on file     Forced sexual activity: Not on file   Other Topics Concern     Not on file   Social History Narrative      Not on file        Allergies   Allergen Reactions     Penicillins Hives              Key Medications:       amiodarone         propofol         acetaminophen  975 mg Oral Q8H     amiodarone  150 mg Intravenous Once     aspirin  325 mg Oral Daily    Or     aspirin  300 mg Rectal Daily     atorvastatin  40 mg Oral or NG Tube Daily at 8 pm     ceFAZolin  2 g Intravenous Q8H     gadobutrol  10 mL Intravenous Once     lactated ringers  1,000 mL Intravenous Once     sodium chloride (PF)  3 mL Intracatheter Q8H       amiodarone       amiodarone       - MEDICATION INSTRUCTIONS -       phenylephrine       propofol (DIPRIVAN) infusion       sodium chloride 100 mL/hr at 04/17/19 2327        Home Meds    No current facility-administered medications on file prior to encounter.   Current Outpatient Medications on File Prior to Encounter:  amLODIPine (NORVASC) 5 MG tablet Take 5 mg by mouth daily   aspirin 81 MG EC tablet Take 81 mg by mouth daily   atorvastatin (LIPITOR) 10 MG tablet Take 10 mg by mouth daily   hydrochlorothiazide (HYDRODIURIL) 12.5 MG tablet Take 12.5 mg by mouth daily   losartan (COZAAR) 100 MG tablet Take 100 mg by mouth daily              Physical Examination:   Temp:  [97.3  F (36.3  C)-101.3  F (38.5  C)] (P) 97.3  F (36.3  C)  Heart Rate:  [] 154  Resp:  [12-26] 14  BP: ()/(44-88) 127/50  FiO2 (%):  [100 %] 100 %  SpO2:  [88 %-97 %] (P) 94 %    Intake/Output Summary (Last 24 hours) at 4/18/2019 1711  Last data filed at 4/18/2019 1630  Gross per 24 hour   Intake 2650 ml   Output 950 ml   Net 1700 ml     Wt Readings from Last 4 Encounters:   04/18/19 70.8 kg (156 lb 1.4 oz)        Ventilation Mode: CMV/AC  (Continuous Mandatory Ventilation/ Assist Control)  FiO2 (%): 100 %  Rate Set (breaths/minute): 18 breaths/min  Tidal Volume Set (mL): 450 mL  PEEP (cm H2O): 5 cmH2O  Oxygen Concentration (%): 100 %  Resp: 14    No lab results found in last 7 days.    GEN: no acute distress, sedated   HEENT:  head ncat, sclera anicteric, OP patent, trachea midline   PULM: unlabored synchronous with vent, clear anteriorly    CV/COR: RRR S1S2 no gallop,  No rub, no murmur  ABD: soft nontender, hypoactive bowel sounds, no mass  EXT:  No Edema;   Warm x4  NEURO: sedated  SKIN: no obvious rash  LINES: clean, dry intact         Data:   All data and imaging reviewed     ROUTINE ICU LABS (Last four results)  CMP  Recent Labs   Lab 04/18/19  1253 04/17/19  1818 04/17/19  0805     --  129*   POTASSIUM 3.7  --  3.9   CHLORIDE 105  --  96   CO2 20  --  22   ANIONGAP 11  --  11   *  --  136*   BUN 24  --  25   CR 0.67  --  0.84   GFRESTIMATED 77  --  61   GFRESTBLACK 89  --  70   LULU 8.8  --  9.7   PROTTOTAL  --  6.4* 7.4   ALBUMIN  --  2.5* 3.0*   BILITOTAL  --  0.8 0.8   ALKPHOS  --  122 128   AST  --  26 23   ALT  --  29 30     CBC  Recent Labs   Lab 04/18/19  1253 04/17/19  0805   WBC 10.6 14.7*   RBC 3.52* 3.65*   HGB 10.9* 11.4*   HCT 30.5* 31.6*   MCV 87 87   MCH 31.0 31.2   MCHC 35.7 36.1   RDW 14.5 14.1   * 199     INR  Recent Labs   Lab 04/17/19  0805   INR 1.20*     Arterial Blood GasNo lab results found in last 7 days.    All cultures:  Recent Labs   Lab 04/18/19  1117 04/17/19  2215 04/17/19  2210 04/17/19  1003 04/17/19  1001   CULT PENDING Cultured on the 1st day of incubation:  Gram positive cocci in clusters  *  Critical Value/Significant Value, preliminary result only, called to and read back by  Vane Guzman RN at 9:10am 4/18/2019 ()   Cultured on the 1st day of incubation:  Gram positive cocci in clusters  *  Critical Value/Significant Value, preliminary result only, called to and read back by  Maday Wynne RN at 9:55am 4/18/19 ()   Cultured on the 1st day of incubation:  Staphylococcus aureus  *  Critical Value/Significant Value, preliminary result only, called to and read back by  TRA VERA RN [id number] 4.17.19 NDP    Susceptibility testing in progress  (Note)  POSITIVE for  STAPHYLOCOCCUS AUREUS and NEGATIVE for the mecA gene  (not MRSA) by Verigene multiplex nucleic acid test. The mecA gene was  not detected. Final identification and antimicrobial susceptibility  testing will be verified by standard methods.    Specimen tested with Verigene multiplex, gram-positive blood culture  nucleic acid test for the following targets: Staph aureus, Staph  epidermidis, Staph lugdunensis, other Staph species, Enterococcus  faecalis, Enterococcus faecium, Streptococcus species, S. agalactiae,  S. anginosus grp., S. pneumoniae, S. pyogenes, Listeria sp., mecA  (methicillin resistance) and Martin/B (vancomycin resistance).    Critical Value/Significant Value called to and read back by Vaishali Weaver RN on 04.17.2019 at 2358.  JRT   Cultured on the 1st day of incubation:  Staphylococcus aureus  *  Critical Value/Significant Value, preliminary result only, called to and read back by  TRA VERA RN [id number] 4.17.19 NDP    Susceptibility testing done on previous specimen     Recent Results (from the past 24 hour(s))   XR Joint Aspiration Major Left    Narrative    EXAM: XR JOINT ASPIRATION MAJOR LEFT  4/18/2019 11:32 AM       History:  Left hip pain - left total hip arthroplasty - fever - rule  out septic hip.     PROCEDURE: The risks (including bleeding, infection, and allergy to  contrast and medications) and benefits of the procedure were explained  to the patient and consent was obtained from the patient's daughter  due to the patient's mental status.  Using sterile technique and  fluoroscopic guidance, a #20 gauge needle was placed into the Left hip  joint using an anterior approach.  2 mL of pink-tinged white thick  cloudy fluid was aspirated.  No initial complication. Fluid sent to  lab for analysis.      Fluoroscopy time: 0.1 minutes  Number of Images: 1  Medications used: 3 mL Lidocaine 1% intradermally       Impression    IMPRESSION:  Technically successful left hip aspiration.     LISY HYDE  SUSANA   MRI Brain w & w/o contrast    Narrative    MRI OF THE BRAIN WITHOUT AND WITH CONTRAST 4/18/2019 12:15 PM     COMPARISON: Head CT 4/17/2019.    HISTORY: Focal neuro deficit, more than 6 hours, stroke suspected.     TECHNIQUE: Axial diffusion-weighted with ADC map, axial T2-weighted  with fat saturation, axial T1-weighted, axial turboFLAIR and coronal  T1-weighted images of the brain were acquired without intravenous  contrast.  Following intravenous administration of gadolinium (7 mL  Gadavist), axial T1-weighted images of the brain were acquired.     FINDINGS: There are numerous tiny and small ischemic infarcts  scattered throughout the cerebral and cerebellar hemispheres  bilaterally including moderate-sized cortical infarcts at the  frontoparietal junction on the right and posterior occipital lobe on  the left. There are also infarcts involving the right thalamus and  left caudate head. Given these infarcts are scattered throughout  multiple vascular distributions, embolic infarcts from a central  embolic source should be considered.    There is moderate diffuse cerebral volume loss. There are numerous  scattered focal and patchy periventricular areas of abnormal T2 signal  hyperintensity in the cerebral white matter bilaterally that are  consistent with sequela of chronic small vessel ischemic disease. Most  of the recent ischemic infarcts also demonstrate abnormal T2 signal  hyperintensity suggesting they are greater than 6 hours old.    The ventricles and basal cisterns are within normal limits in  configuration given the degree of cerebral volume loss. There is no  midline shift. There are no extra-axial fluid collections. There is no  evidence for acute intracranial hemorrhage. There is no abnormal  contrast enhancement in the brain or its coverings.    There is no sinusitis or mastoiditis.      Impression    IMPRESSION:  1. Numerous scattered infarcts involving the cerebral and cerebellar  hemispheres  bilaterally and basal ganglia bilaterally consistent with  embolic infarcts from a central embolic source.  2. Diffuse cerebral volume loss and cerebral white matter changes  consistent with chronic small vessel ischemic disease.    BARON ZEPEDA MD     Labs personally reviewed/interpreted, see a/p.  CXR (personally reviewed/interpreted):  Lungs clear.  NG and ETT in good position.    D/w Dr. Zhang of orthopedic surgery    Billing: This patient is critically ill: Yes. Total critical care time today 60 min.

## 2019-04-18 NOTE — PROGRESS NOTES
Patient brought up from OR intubated and placed on ventilator.    Ventilation Mode: CMV/AC  (Continuous Mandatory Ventilation/ Assist Control)  FiO2 (%): 100 %  Rate Set (breaths/minute): 18 breaths/min  Tidal Volume Set (mL): 450 mL  PEEP (cm H2O): 5 cmH2O  Oxygen Concentration (%): 100 %  Resp: 14    ETT 7.0, 23cm@lip.    Continue to monitor.

## 2019-04-18 NOTE — OP NOTE
[facility]  Orthopedic Operative Note    Belgica Klein MRN# [id number]   YOB: 1928  Procedure Date:  4/18/2019  Age: 89 year old     PREOPERATIVE DIAGNOSIS: Left septic total hip arthroplasty    POSTOPERATIVE DIAGNOSIS: Left septic total hip arthroplasty    PROCEDURE PERFORMED: Direct anterior approach irrigation and debridement left septic total hip arthroplasty     SURGEON:  Chalino Zhang    FIRST ASSISTANT: Shyla Suggs PA-C,  whose assistance was critical for positioning, preparation, retraction during exposure, implantation as well as closure.    ANESTHESIA:  General    EBL: 100 mL    INDICATIONS:  Belgica Klein is a 89 year old year old female who presents with septic left total hip arthroplasty confirmed the aspiration with 240,000 white blood cells and gross purulence on aspiration.  Patient indicated for urgent irrigation and debridement secondary to sepsis.  Risks, benefits and alternatives discussed with patient's daughter and son-in-law in detail.  Risks of bleeding infection nerve damage discussed.  Possibly of intraoperative or postoperative death discussed given the presence of sepsis.  Leading up to the time of surgery the patient became increasingly unstable including the development of new onset atrial fibrillation and hypotension.  We discussed that the patient was extremely high risk for mortality regardless of whether we perform irrigation debridement today or not.  I recommended proceeding with irrigation debridement as I feel that unless source control the infection is achieved the patient is unlikely to improve.  She has failed to respond to isolated medical treatment with IV antibiotics.  The patient's daughter and son-in-law verbalized understanding and elected to allow us to proceed in hopes that source control the infection would improve the patient's overall medical status.  Given our inability to obtain information regarding the implants as they are 20  years old and the patient was critical condition, we have elected to perform a damage control irrigation debridement for the purposes of decompressing the purulence.  We will potentially address the components in the future if needed.  Signed informed consent was obtained and placed on chart.      PROCEDURE:  The patient was identified in the preoperative area per hospital policy, correct operative site marked. Patient brought to the operating room. General anesthesia induced.  Transferred onto the Osteen table in supine position.  Positioned appropriately with all bony prominences well padded. Chlorhexidine prescrub performed. ChloraPrep applied. Draped in sterile fashion. Antibiotic administration confirmed and tranexamic acid given. Timeout performed per hospital protocol, correctly identifying the patient, operative site, and procedure to be performed.  All in the room agreed.        Using bony landmarks, I made an longitudinal incision for direct anterior approach 8 cm in length extending distally from a point 2cm distal and 2cm lateral to the anterior superior iliac spine. Incision  Was carried sharply through skin and subcutaneous tissue, identifying and incising fascia in-line with the incision. Tensor fascia german was bluntly dissected free and retracted laterally. The ascending branches of the lateral femoral circumflex were identified and cauterized. Incison made through floor of tensor fascis and pericapsular fat identified and excised. The capsule was exposed. Blunt Ji retractors placed proximal and distal around the femoral neck and a capsulectomy was performed to expose the femoral neck.  Gross purulence was noted.  This was sampled for aerobic, anaerobic and Gram stain.  Remaining purulence was evacuated with suction.  We thoroughly irrigated the hip joint while patient has good range of motion as well as distraction hip joint with antibiotic impregnated sterile saline.  We then placed 1 g vancomycin  powder in to the hip joint.  Hemovac drain placed into the joint exiting distal anterior to the wound.  We then turned our attention to closure.  We closed tensor fascia german with #1 running PDS, subcutaneous with 2-0 PDS and skin with staples. Sterile Aquacel Ag dressing applied.   Sponge and needle counts were correct.  Patient subsequently transferred to the intensive care unit in critical condition.    POSTOPERATIVE PLAN:   1.  Weightbearing as tolerated with assistive devices as needed.  2.  No hip precautions.  No abduction pillow necessary.   3.  Maintain Hemovac until less than 10 cc per shift  4.  Physical therapy.   5.  DVT prophylaxis per primary  6.  Antibiotics per infectious disease  7.  Follow up with in 2 weeks for wound check. Keep Aquacel dressing in place until follow-up.  8.  Discussed procedures performed today and plan going forward in detail with patient's daughter and son-in-law.  Advised them that the patient may ultimately need to be returned to the operating room for single stage revision total hip arthroplasty depending on how she responds to today's seizure.    Chalino Zhang

## 2019-04-18 NOTE — CONSULTS
[facility]    Infectious Disease Consultation     Date of Admission:  4/17/2019  Date of Consult (When I saw the patient): 04/18/19    Assessment & Plan   Belgica Klein is a 89 year old female who was admitted on 4/17/2019.     Impression:  1. 89 y.o with hypertension, TIA, hyperlipidemia, and recurrent UTIs.   2. Admitted with  change in mental state and aphasia, fever.   3. Also has had pain in  left hip. Bilateral GURJIT.   4. In the hospital blood cultures positive for MSSA.   5. The left hip aspiration done and fluid grossly infected with [id number] cells 72 % neutrophils. Going to the OR today for I and d. Cultures from the hip fluid pending.     Recommendations:   Agree with Ancef.   Will follow up on the cultures.   Avoid long term IV lines for now.   Will need daily blood cultures.         Bertram Carrillo MD    Reason for Consult   Reason for consult: I was asked by Dr. Li to evaluate this patient for MSSA bacteremia, Left hip infection. .    Primary Care Physician   Physician No Ref-Primary    Chief Complaint   Fever   Confusion left hip pain     History is obtained from the patient and medical records    History of Present Illness   Belgica Klein is a 89 year old female with  left hip has been bothering her for multiple days. Who was seen at [facility] yesterday for right knee cortisone injection where x-rays were taken and no gross abnormalities were seen. After returning home patient seemed to be in her normal state. Over the evening, however, she became more confused and aphasic with jumbled speech. She also had a recorded temp of 101 degrees yesterday as well. On admittance to ED a code stroke was called. Blood cultures positive for MSSA. Aspiration on the hip done, cell count very high. Going to the OR for I and D.     Past Medical History   I have reviewed this patient's medical history and updated it with pertinent information if needed.   No past medical history on file.    Past Surgical  History   I have reviewed this patient's surgical history and updated it with pertinent information if needed.  No past surgical history on file.    Prior to Admission Medications   Prior to Admission Medications   Prescriptions Last Dose Informant Patient Reported? Taking?   amLODIPine (NORVASC) 5 MG tablet 4/14/2019 at am Pharmacy Yes Yes   Sig: Take 5 mg by mouth daily   aspirin 81 MG EC tablet 4/14/2019 at am Daughter Yes Yes   Sig: Take 81 mg by mouth daily   atorvastatin (LIPITOR) 10 MG tablet 4/14/2019 at am Pharmacy Yes Yes   Sig: Take 10 mg by mouth daily   hydrochlorothiazide (HYDRODIURIL) 12.5 MG tablet 4/14/2019 at am Pharmacy Yes Yes   Sig: Take 12.5 mg by mouth daily   losartan (COZAAR) 100 MG tablet 4/14/2019 at am Pharmacy Yes Yes   Sig: Take 100 mg by mouth daily      Facility-Administered Medications: None     Allergies   Allergies   Allergen Reactions     Penicillins Hives       Immunization History     There is no immunization history on file for this patient.    Social History   I have reviewed this patient's social history and updated it with pertinent information if needed. Belgica Klein      Family History   I have reviewed this patient's family history and updated it with pertinent information if needed.   No family history on file.    Review of Systems   The 10 point Review of Systems is negative other than noted in the HPI or here.     Physical Exam   Temp: 99.3  F (37.4  C) Temp src: Axillary BP: 127/50(art line)   Heart Rate: 154 Resp: 14 SpO2: 96 % O2 Device: Oxymask Oxygen Delivery: 4 LPM  Vital Signs with Ranges  Temp:  [97.9  F (36.6  C)-101.3  F (38.5  C)] 99.3  F (37.4  C)  Heart Rate:  [] 154  Resp:  [12-26] 14  BP: ()/(44-88) 127/50  SpO2:  [88 %-97 %] 96 %  156 lbs 1.37 oz  Body mass index is 23.73 kg/m .    GENERAL APPEARANCE:  Lethargic and disoriented   EYES: Eyes grossly normal to inspection, PERRL and conjunctivae and sclerae normal  HENT: ear canals and TM's  normal and nose and mouth without ulcers or lesions  NECK: no adenopathy, no asymmetry, masses, or scars and thyroid normal to palpation  RESP: lungs clear to auscultation - no rales, rhonchi or wheezes  CV: regular rates and rhythm, normal S1 S2, no S3 or S4 and no murmur, click or rub  LYMPHATICS: normal ant/post cervical and supraclavicular nodes  ABDOMEN: soft, nontender, without hepatosplenomegaly or masses and bowel sounds normal  MS: extremities normal- no gross deformities noted  SKIN: no suspicious lesions or rashes      Data   Lab Results   Component Value Date    WBC 10.6 04/18/2019    HGB 10.9 (L) 04/18/2019    HCT 30.5 (L) 04/18/2019     (L) 04/18/2019     04/18/2019    POTASSIUM 3.7 04/18/2019    CHLORIDE 105 04/18/2019    CO2 20 04/18/2019    BUN 24 04/18/2019    CR 0.67 04/18/2019     (H) 04/18/2019    SED 80 (H) 04/18/2019    AST 26 04/17/2019    ALT 29 04/17/2019    ALKPHOS 122 04/17/2019    BILITOTAL 0.8 04/17/2019    INR 1.20 (H) 04/17/2019     Recent Labs   Lab 04/18/19  1117 04/17/19  2215 04/17/19  2210 04/17/19  1003 04/17/19  1001   CULT PENDING Cultured on the 1st day of incubation:  Gram positive cocci in clusters  *  Critical Value/Significant Value, preliminary result only, called to and read back by  Vane Guzman RN at 9:10am 4/18/2019 ()   Cultured on the 1st day of incubation:  Gram positive cocci in clusters  *  Critical Value/Significant Value, preliminary result only, called to and read back by  Maday Wynne RN at 9:55am 4/18/19 ()   Cultured on the 1st day of incubation:  Staphylococcus aureus  *  Critical Value/Significant Value, preliminary result only, called to and read back by  TRA VERA RN [id number] 4.17.19 NDP    Susceptibility testing in progress  (Note)  POSITIVE for STAPHYLOCOCCUS AUREUS and NEGATIVE for the mecA gene  (not MRSA) by Dubizzle multiplex nucleic acid test. The mecA gene was  not detected. Final identification and antimicrobial  susceptibility  testing will be verified by standard methods.    Specimen tested with Verigene multiplex, gram-positive blood culture  nucleic acid test for the following targets: Staph aureus, Staph  epidermidis, Staph lugdunensis, other Staph species, Enterococcus  faecalis, Enterococcus faecium, Streptococcus species, S. agalactiae,  S. anginosus grp., S. pneumoniae, S. pyogenes, Listeria sp., mecA  (methicillin resistance) and Martin/B (vancomycin resistance).    Critical Value/Significant Value called to and read back by Vaishali Weaver RN on 04.17.2019 at 2358.  JRT   Cultured on the 1st day of incubation:  Staphylococcus aureus  *  Critical Value/Significant Value, preliminary result only, called to and read back by  TRA VERA RN [id number] 4.17.19 NDP    Susceptibility testing done on previous specimen     Recent Labs   Lab Test 04/18/19  1117 04/17/19  2215 04/17/19  2210 04/17/19  1003 04/17/19  1001   CULT PENDING Cultured on the 1st day of incubation:  Gram positive cocci in clusters  *  Critical Value/Significant Value, preliminary result only, called to and read back by  Vane Guzman RN at 9:10am 4/18/2019 ()   Cultured on the 1st day of incubation:  Gram positive cocci in clusters  *  Critical Value/Significant Value, preliminary result only, called to and read back by  Maday Wynne RN at 9:55am 4/18/19 ()   Cultured on the 1st day of incubation:  Staphylococcus aureus  *  Critical Value/Significant Value, preliminary result only, called to and read back by  TRA VERA RN [id number] 4.17.19 NDP    Susceptibility testing in progress  (Note)  POSITIVE for STAPHYLOCOCCUS AUREUS and NEGATIVE for the mecA gene  (not MRSA) by Verigene multiplex nucleic acid test. The mecA gene was  not detected. Final identification and antimicrobial susceptibility  testing will be verified by standard methods.    Specimen tested with Verigene multiplex, gram-positive blood culture  nucleic acid test for the following  targets: Staph aureus, Staph  epidermidis, Staph lugdunensis, other Staph species, Enterococcus  faecalis, Enterococcus faecium, Streptococcus species, S. agalactiae,  S. anginosus grp., S. pneumoniae, S. pyogenes, Listeria sp., mecA  (methicillin resistance) and Martin/B (vancomycin resistance).    Critical Value/Significant Value called to and read back by Vaishali Weaver RN on 04.17.2019 at 2358.  JRT   Cultured on the 1st day of incubation:  Staphylococcus aureus  *  Critical Value/Significant Value, preliminary result only, called to and read back by  TRA VERA RN [id number] 4.17.19 NDP    Susceptibility testing done on previous specimen

## 2019-04-19 LAB
ANION GAP SERPL CALCULATED.3IONS-SCNC: 9 MMOL/L (ref 3–14)
BACTERIA SPEC CULT: ABNORMAL
BUN SERPL-MCNC: 36 MG/DL (ref 7–30)
CALCIUM SERPL-MCNC: 8.1 MG/DL (ref 8.5–10.1)
CHLORIDE SERPL-SCNC: 108 MMOL/L (ref 94–109)
CO2 SERPL-SCNC: 20 MMOL/L (ref 20–32)
CREAT SERPL-MCNC: 0.93 MG/DL (ref 0.52–1.04)
ERYTHROCYTE [DISTWIDTH] IN BLOOD BY AUTOMATED COUNT: 14.7 % (ref 10–15)
GFR SERPL CREATININE-BSD FRML MDRD: 54 ML/MIN/{1.73_M2}
GLUCOSE BLDC GLUCOMTR-MCNC: 130 MG/DL (ref 70–99)
GLUCOSE BLDC GLUCOMTR-MCNC: 134 MG/DL (ref 70–99)
GLUCOSE BLDC GLUCOMTR-MCNC: 137 MG/DL (ref 70–99)
GLUCOSE BLDC GLUCOMTR-MCNC: 141 MG/DL (ref 70–99)
GLUCOSE BLDC GLUCOMTR-MCNC: 151 MG/DL (ref 70–99)
GLUCOSE SERPL-MCNC: 153 MG/DL (ref 70–99)
HCT VFR BLD AUTO: 27.7 % (ref 35–47)
HGB BLD-MCNC: 9.8 G/DL (ref 11.7–15.7)
Lab: ABNORMAL
Lab: ABNORMAL
MAGNESIUM SERPL-MCNC: 1.9 MG/DL (ref 1.6–2.3)
MCH RBC QN AUTO: 30.5 PG (ref 26.5–33)
MCHC RBC AUTO-ENTMCNC: 35.4 G/DL (ref 31.5–36.5)
MCV RBC AUTO: 86 FL (ref 78–100)
PHOSPHATE SERPL-MCNC: 2.5 MG/DL (ref 2.5–4.5)
PLATELET # BLD AUTO: 125 10E9/L (ref 150–450)
POTASSIUM SERPL-SCNC: 4.5 MMOL/L (ref 3.4–5.3)
RBC # BLD AUTO: 3.21 10E12/L (ref 3.8–5.2)
SODIUM SERPL-SCNC: 137 MMOL/L (ref 133–144)
SPECIMEN SOURCE: ABNORMAL
SPECIMEN SOURCE: ABNORMAL
WBC # BLD AUTO: 13.6 10E9/L (ref 4–11)

## 2019-04-19 PROCEDURE — 25000132 ZZH RX MED GY IP 250 OP 250 PS 637: Performed by: INTERNAL MEDICINE

## 2019-04-19 PROCEDURE — 85027 COMPLETE CBC AUTOMATED: CPT | Performed by: INTERNAL MEDICINE

## 2019-04-19 PROCEDURE — 20000003 ZZH R&B ICU

## 2019-04-19 PROCEDURE — 99233 SBSQ HOSP IP/OBS HIGH 50: CPT | Performed by: PSYCHIATRY & NEUROLOGY

## 2019-04-19 PROCEDURE — 83735 ASSAY OF MAGNESIUM: CPT | Performed by: INTERNAL MEDICINE

## 2019-04-19 PROCEDURE — 25800030 ZZH RX IP 258 OP 636: Performed by: INTERNAL MEDICINE

## 2019-04-19 PROCEDURE — 25000128 H RX IP 250 OP 636: Performed by: INTERNAL MEDICINE

## 2019-04-19 PROCEDURE — 00000146 ZZHCL STATISTIC GLUCOSE BY METER IP

## 2019-04-19 PROCEDURE — 87077 CULTURE AEROBIC IDENTIFY: CPT | Performed by: INTERNAL MEDICINE

## 2019-04-19 PROCEDURE — 84100 ASSAY OF PHOSPHORUS: CPT | Performed by: INTERNAL MEDICINE

## 2019-04-19 PROCEDURE — 40000008 ZZH STATISTIC AIRWAY CARE

## 2019-04-19 PROCEDURE — 80048 BASIC METABOLIC PNL TOTAL CA: CPT | Performed by: INTERNAL MEDICINE

## 2019-04-19 PROCEDURE — C9113 INJ PANTOPRAZOLE SODIUM, VIA: HCPCS | Performed by: INTERNAL MEDICINE

## 2019-04-19 PROCEDURE — A9270 NON-COVERED ITEM OR SERVICE: HCPCS | Performed by: INTERNAL MEDICINE

## 2019-04-19 PROCEDURE — 94003 VENT MGMT INPAT SUBQ DAY: CPT

## 2019-04-19 PROCEDURE — 87040 BLOOD CULTURE FOR BACTERIA: CPT | Performed by: INTERNAL MEDICINE

## 2019-04-19 PROCEDURE — 40000275 ZZH STATISTIC RCP TIME EA 10 MIN

## 2019-04-19 PROCEDURE — 99207 ZZC NON-BILLABLE SERV PER CHARTING: CPT | Performed by: INTERNAL MEDICINE

## 2019-04-19 PROCEDURE — 99291 CRITICAL CARE FIRST HOUR: CPT | Performed by: INTERNAL MEDICINE

## 2019-04-19 RX ORDER — ATORVASTATIN CALCIUM 10 MG/1
10 TABLET, FILM COATED ORAL
Status: DISCONTINUED | OUTPATIENT
Start: 2019-04-19 | End: 2019-04-25

## 2019-04-19 RX ADMIN — CEFAZOLIN: 1 INJECTION, POWDER, FOR SOLUTION INTRAMUSCULAR; INTRAVENOUS at 02:28

## 2019-04-19 RX ADMIN — HYDROMORPHONE HYDROCHLORIDE 0.5 MG: 1 INJECTION, SOLUTION INTRAMUSCULAR; INTRAVENOUS; SUBCUTANEOUS at 22:32

## 2019-04-19 RX ADMIN — CHLORHEXIDINE GLUCONATE 15 ML: 1.2 RINSE ORAL at 08:13

## 2019-04-19 RX ADMIN — HYDROMORPHONE HYDROCHLORIDE 0.5 MG: 1 INJECTION, SOLUTION INTRAMUSCULAR; INTRAVENOUS; SUBCUTANEOUS at 14:40

## 2019-04-19 RX ADMIN — AMIODARONE HYDROCHLORIDE 150 MG: 1.5 INJECTION, SOLUTION INTRAVENOUS at 20:13

## 2019-04-19 RX ADMIN — ASPIRIN 300 MG: 300 SUPPOSITORY RECTAL at 08:20

## 2019-04-19 RX ADMIN — CEFAZOLIN: 1 INJECTION, POWDER, FOR SOLUTION INTRAMUSCULAR; INTRAVENOUS at 18:27

## 2019-04-19 RX ADMIN — CEFAZOLIN: 1 INJECTION, POWDER, FOR SOLUTION INTRAMUSCULAR; INTRAVENOUS at 10:30

## 2019-04-19 RX ADMIN — INSULIN ASPART 1 UNITS: 100 INJECTION, SOLUTION INTRAVENOUS; SUBCUTANEOUS at 14:48

## 2019-04-19 RX ADMIN — HYDROMORPHONE HYDROCHLORIDE 0.5 MG: 1 INJECTION, SOLUTION INTRAMUSCULAR; INTRAVENOUS; SUBCUTANEOUS at 08:20

## 2019-04-19 RX ADMIN — HYDROMORPHONE HYDROCHLORIDE 0.3 MG: 1 INJECTION, SOLUTION INTRAMUSCULAR; INTRAVENOUS; SUBCUTANEOUS at 00:25

## 2019-04-19 RX ADMIN — PROPOFOL 20 MCG/KG/MIN: 10 INJECTION, EMULSION INTRAVENOUS at 02:17

## 2019-04-19 RX ADMIN — PANTOPRAZOLE SODIUM 40 MG: 40 INJECTION, POWDER, FOR SOLUTION INTRAVENOUS at 08:43

## 2019-04-19 RX ADMIN — SODIUM CHLORIDE, POTASSIUM CHLORIDE, SODIUM LACTATE AND CALCIUM CHLORIDE 500 ML: 600; 310; 30; 20 INJECTION, SOLUTION INTRAVENOUS at 06:43

## 2019-04-19 RX ADMIN — AMIODARONE HYDROCHLORIDE 0.5 MG/MIN: 50 INJECTION, SOLUTION INTRAVENOUS at 20:43

## 2019-04-19 RX ADMIN — HYDROMORPHONE HYDROCHLORIDE 0.5 MG: 1 INJECTION, SOLUTION INTRAMUSCULAR; INTRAVENOUS; SUBCUTANEOUS at 18:25

## 2019-04-19 RX ADMIN — SODIUM CHLORIDE: 9 INJECTION, SOLUTION INTRAVENOUS at 22:21

## 2019-04-19 RX ADMIN — MAGNESIUM SULFATE HEPTAHYDRATE 2 G: 40 INJECTION, SOLUTION INTRAVENOUS at 05:15

## 2019-04-19 RX ADMIN — INSULIN ASPART 1 UNITS: 100 INJECTION, SOLUTION INTRAVENOUS; SUBCUTANEOUS at 20:29

## 2019-04-19 ASSESSMENT — MIFFLIN-ST. JEOR: SCORE: 1239.5

## 2019-04-19 ASSESSMENT — ACTIVITIES OF DAILY LIVING (ADL)
ADLS_ACUITY_SCORE: 18

## 2019-04-19 NOTE — PLAN OF CARE
OT and PT: spoke with nursing, pt not appropriate for therapy today, pt is intubated and sedated.

## 2019-04-19 NOTE — PROGRESS NOTES
ETT advanced 2 cm and secured 25 cm at lip . Good bilateral breath sounds . Will continue to follow

## 2019-04-19 NOTE — PROVIDER NOTIFICATION
Restraints were removed because patient demonstrated ability to cooperate, follow instructions and leave remaining therapeutic lines and tubes alone while awake, thus meeting discontinuation criteria.  Pt Extubated.

## 2019-04-19 NOTE — PROGRESS NOTES
Vascular Neurology Progress Note      Chief complaint: Altered Mental Status (since 0300 pt been acting confusion per daughter - pt from out of town - hx of hyponatremia - similiar symptoms with hyponatremia - pt had flu in Feb. - pt also complaining of left hip pain and right knee pain )      Overnight events: was extubated this morning. Has had left hip pain postop and is grimacing frequently per family, and has had pain meds including dilaudid about an hour before my exam      Impression:  Post central gyrus ischemic stroke ?ESUS concern for septic emboli  Hip infection/sepsis  Afib  Left hemiparesis    Recommendations:  -Aspirin continue for now  -Statin - will reduce dose back to home dose due to lower-end LDL of 82  -Consider further valve imaging with cardiac MRI  - Would recommend to hold off on anticoagulation until vegetations are ruled out due to higher risk of hemorrhagic conversion  - PT/OT/SLP  -Ancef    Patient follow up:  - final recommendation pending work-up    Lore Osullivan PA-C  Neurology  04/19/2019 5:21 PM  Pager: [phone]    ____________________________________________________________________      Medications    Scheduled medications    acetaminophen  975 mg Oral Q8H     amiodarone  150 mg Intravenous Once     aspirin  325 mg Oral Daily    Or     aspirin  300 mg Rectal Daily     atorvastatin  40 mg Oral or NG Tube Daily at 8 pm     IV Antibiotic builder   Intravenous Q8H     chlorhexidine  15 mL Mouth/Throat Q12H     gadobutrol  10 mL Intravenous Once     insulin aspart  1-7 Units Subcutaneous TID AC     insulin aspart  1-5 Units Subcutaneous At Bedtime     pantoprazole  40 mg Oral or Feeding Tube QAM AC    Or     pantoprazole (PROTONIX) IV  40 mg Intravenous QAM AC     sodium chloride (PF)  3 mL Intracatheter Q8H       Infusion medications    amiodarone Stopped (04/18/19 2301)     - MEDICATION INSTRUCTIONS -       norepinephrine 0.05 mcg/kg/min (04/19/19 1151)     phenylephrine Stopped  (04/18/19 1715)     propofol (DIPRIVAN) infusion Stopped (04/19/19 1144)     sodium chloride 30 mL/hr at 04/18/19 1928     sodium chloride 100 mL/hr at 04/19/19 0626       PRN medications  Current Facility-Administered Medications   Medication Dose Route Frequency     acetaminophen  650 mg Rectal Q4H PRN     [START ON 4/21/2019] acetaminophen  650 mg Oral Q4H PRN     sore throat lozenge  1-2 lozenge Buccal Q1H PRN     benztropine  1-2 mg Oral TID PRN     glucose  15-30 g Oral Q15 Min PRN    Or     dextrose  25-50 mL Intravenous Q15 Min PRN    Or     glucagon  1 mg Subcutaneous Q15 Min PRN     HYDROmorphone  0.3-0.5 mg Intravenous Q1H PRN     labetalol  10-40 mg Intravenous Q10 Min PRN     lidocaine 4%   Topical Q1H PRN     lidocaine (buffered or not buffered)  0.1-1 mL Other Q1H PRN     LORazepam  0.5 mg Intravenous Once PRN     magnesium sulfate  2 g Intravenous Daily PRN     magnesium sulfate  4 g Intravenous Q4H PRN     - MEDICATION INSTRUCTIONS -   Does not apply Continuous PRN     melatonin  1 mg Oral At Bedtime PRN     naloxone  0.1-0.4 mg Intravenous Q2 Min PRN     OLANZapine zydis  2.5 mg Oral TID PRN     ondansetron  4 mg Oral Q6H PRN    Or     ondansetron  4 mg Intravenous Q6H PRN     potassium chloride  20-40 mEq Oral or Feeding Tube Q2H PRN     potassium chloride with lidocaine  10 mEq Intravenous Q1H PRN     potassium chloride  10 mEq Intravenous Q1H PRN     potassium chloride  20 mEq Intravenous Q1H PRN     potassium chloride  20-40 mEq Oral Q2H PRN     senna-docusate  1 tablet Oral BID PRN    Or     senna-docusate  2 tablet Oral BID PRN     sodium chloride (PF)  3 mL Intracatheter q1 min prn     sodium phosphate  15 mmol Intravenous Daily PRN     sodium phosphate  20 mmol Intravenous Q6H PRN     sodium phosphate  25 mmol Intravenous Q8H PRN     traMADol  50 mg Oral Q6H PRN       Allergies  Allergies   Allergen Reactions     Penicillins Hives         Physical Examination    Vital Signs:  Temp:  [97.7   F (36.5  C)-98.9  F (37.2  C)] 98.5  F (36.9  C)  Pulse:  [] 96  Heart Rate:  [] 121  Resp:  [11-27] 21  BP: ()/() 111/84  MAP:  [60 mmHg-104 mmHg] 78 mmHg  Arterial Line BP: ()/(43-89) 81/77  FiO2 (%):  [40 %-100 %] 40 %  SpO2:  [89 %-100 %] 95 %     Neurologic  Mental Status: lethargic, answers some questions and does not seem dysarthric, but does not participate in most of exam  Cranial Nerves: PERRL, facial movements symmetric to grimace, hearing not formally tested but intact to conversation, tongue protrusion midline, Has a mild right gaze preference with suspected visual neglect/left homonymous hemianopia  Motor: slight wiggle right thumb on command, not on left. Does not wiggle toes on command  Reflexes: toes mute  Sensory: senstion to painful nailbed pressure deferred at present  Coordination: Unable to assess  Station/Gait: unable to test            Labs/Studies:  CBC:     Recent Labs   Lab 04/19/19  0408 04/18/19  1253 04/17/19  0805   WBC 13.6* 10.6 14.7*   RBC 3.21* 3.52* 3.65*   HGB 9.8* 10.9* 11.4*   HCT 27.7* 30.5* 31.6*   * 137* 199     Basic Metabolic Panel:   Recent Labs   Lab 04/19/19  0408 04/18/19  2150 04/18/19  1253 04/17/19  0805     --  136 129*   POTASSIUM 4.5 4.4 3.7 3.9   CHLORIDE 108  --  105 96   CO2 20  --  20 22   BUN 36*  --  24 25   CR 0.93  --  0.67 0.84   *  --  139* 136*   LULU 8.1*  --  8.8 9.7     Liver panel:  Recent Labs   Lab Test 04/17/19  1818 04/17/19  0805   PROTTOTAL 6.4* 7.4   ALBUMIN 2.5* 3.0*   BILITOTAL 0.8 0.8   ALKPHOS 122 128   AST 26 23   ALT 29 30     INR:  Recent Labs   Lab Test 04/17/19  0805   INR 1.20*      Lipid Profile:  Recent Labs   Lab Test 04/17/19 1818   CHOL 126   HDL 22*   LDL 81   TRIG 117     A1C:   Recent Labs   Lab Test 04/17/19 1818   A1C 6.1*     Troponin I: No results for input(s): TROPI in the last 168 hours.      Imaging:  No new 4/19

## 2019-04-19 NOTE — PROVIDER NOTIFICATION
Pt gram stain culutures from left hip came back as many gram positive cocci in cluster.     Notified Dr. Orellana. No new orders.     0315: Fluid from pt him came back as gram positive cocci in clusters.    Dr. Orellana notified. Orders to continue Ancef.

## 2019-04-19 NOTE — PROGRESS NOTES
SPIRITUAL HEALTH SERVICES  Spiritual Assessment Progress Note  [facility] ICU   met with pt's adult children (2 -daughters and 1 -son).  Two more are on the way from [facility].   Family talked about pt being very active and living independently up until this recent stroke.    Pt is Zoroastrian and they have requested a  for anointing.  arrived and is with family now.   Family very concerned how pt will come through this surgery.  They appear very supportive.      listened and provided support.      will continue to follow.                                                                                                                                           Renay Greer M.Div., [facility]  Staff    Pager [phone]

## 2019-04-19 NOTE — PLAN OF CARE
Neuro: pt is sedated on propofol. Responds to painful stimuli. Pupils are sluggish.   CV: remains in Afib, also remains on levo. Mg replaced.   Pulm: CMV 18, 450, 40%, 5. Coarse to Dim LS.   GI/: minimal urine output. See previous notes. ABD remains distended.   Skin: Sacrum dressing intact, coccyx is red but blanchable. L hip dressing is CDI and vac remains patent.   Plan: Family all updated at the bedside, aware of the plan. Plan for Echo LUTHER today.

## 2019-04-19 NOTE — PROGRESS NOTES
Critical Care  Note      04/19/2019    Name: Belgica Klein MRN#: [id number]   Age: 89 year old YOB: 1928     Hsptl Day# 2  ICU DAY #2    MV DAY #2             Problem List:   Active Problems:    Acute CVA (cerebrovascular accident) (H)    Septic hip (H)           Summary/Hospital Course:     89F who presented 4/17 with expressive aphasia and encephalopathy, found to have MSSA bacteremia due to infection of a septic hip, now s/p washout by orthopedics on 4/18.  Found to have evidence of embolic strokes on MRI head.  Unable to obtain further history as pt is intubated/sedated.    No events overnight.  Remains stably vented on a reasonable amount of pressor.      Assessment and plan :     Belgica Klein IS a 89 year old female admitted on 4/17/2019 for MSSA bacteremia/septic hip.   I have personally reviewed the daily labs, imaging studies, cultures and discussed the case with referring physician and consulting physicians.     My assessment and plan by system for this patient is as follows:    Neurology/Psychiatry:   1.  Encepahlopathy:  Due to sepsis and recent strokes.  2.  Analgesia:  diluadid prn  3.  Sedation: propofol  4.  Embolic strokes:  Seen on mri.  No cause noted on TTE.  Plan for LUTHER today.    Cardiovascular:   1. Shock:  Septic.  S/p >30 ml/kg with good effect.  Norepi continues.   Ef 55%, nl R sided fxn.  Mild mitral stenosis.  Lactate ok.   2.  A fib with RVR:  Improved with volume.  K ok 4.5, mag ok 1.9.    Pulmonary/Ventilator Management:   1. Keep intubated post-op for LUTHER today    GI and Nutrition :   1. NPO for now.  2.  ppi for pud    Renal/Fluids/Electrolytes:   1. WENDI:  Creat increased to 0.93 (baseline about 0.5) -- in setting of sepsis.  2. Electrolytes ok.    3. Acid/base status ok-- bicarb 20.    Infectious Disease:   1. MSSA bacteremia and septic hip:  Appreciate ID input.  Continue ancef.    Endocrine:   1. fsg acceptable for now.  Prn insulin vs glucose.    Hematology/Oncology:    1. Wbc 13.6 elevated.  2.  hgb stable at 9.8.  3.  pltlts ok 125.     IV/Access:   1. Venous access - TLC    ICU Prophylaxis:   1. DVT: mechanical, chemo when cleared by ortho  2. VAP: HOB 30 degrees, chlorhexidine rinse  3. Stress Ulcer: PPI  4. Restraints: Nonviolent soft two point restraints required and necessary for patient safety and continued cares and good effect as patient continues to pull at necessary lines, tubes despite education and distraction. Will readdress daily.   5. Wound care  - per ortho  6. Feeding - npo for now  7. Family Update: daughter at bedside.    8. Disposition - icu           Medical History:     No past medical history on file.  No past surgical history on file.  Social History     Socioeconomic History     Marital status:      Spouse name: Not on file     Number of children: Not on file     Years of education: Not on file     Highest education level: Not on file   Occupational History     Not on file   Social Needs     Financial resource strain: Not on file     Food insecurity:     Worry: Not on file     Inability: Not on file     Transportation needs:     Medical: Not on file     Non-medical: Not on file   Tobacco Use     Smoking status: Not on file   Substance and Sexual Activity     Alcohol use: Not on file     Drug use: Not on file     Sexual activity: Not on file   Lifestyle     Physical activity:     Days per week: Not on file     Minutes per session: Not on file     Stress: Not on file   Relationships     Social connections:     Talks on phone: Not on file     Gets together: Not on file     Attends Confucianist service: Not on file     Active member of club or organization: Not on file     Attends meetings of clubs or organizations: Not on file     Relationship status: Not on file     Intimate partner violence:     Fear of current or ex partner: Not on file     Emotionally abused: Not on file     Physically abused: Not on file     Forced sexual activity: Not on file    Other Topics Concern     Not on file   Social History Narrative     Not on file        Allergies   Allergen Reactions     Penicillins Hives              Key Medications:       acetaminophen  975 mg Oral Q8H     amiodarone  150 mg Intravenous Once     aspirin  325 mg Oral Daily    Or     aspirin  300 mg Rectal Daily     atorvastatin  40 mg Oral or NG Tube Daily at 8 pm     IV Antibiotic builder   Intravenous Q8H     chlorhexidine  15 mL Mouth/Throat Q12H     gadobutrol  10 mL Intravenous Once     insulin aspart  1-7 Units Subcutaneous TID AC     insulin aspart  1-5 Units Subcutaneous At Bedtime     pantoprazole  40 mg Oral or Feeding Tube QAM AC    Or     pantoprazole (PROTONIX) IV  40 mg Intravenous QAM AC     sodium chloride (PF)  3 mL Intracatheter Q8H       amiodarone Stopped (04/18/19 2301)     - MEDICATION INSTRUCTIONS -       norepinephrine 0.1 mcg/kg/min (04/19/19 0422)     phenylephrine Stopped (04/18/19 1715)     propofol (DIPRIVAN) infusion 20 mcg/kg/min (04/19/19 0217)     sodium chloride 30 mL/hr at 04/18/19 1928     sodium chloride 100 mL/hr at 04/19/19 0626        Home Meds    No current facility-administered medications on file prior to encounter.   Current Outpatient Medications on File Prior to Encounter:  amLODIPine (NORVASC) 5 MG tablet Take 5 mg by mouth daily   aspirin 81 MG EC tablet Take 81 mg by mouth daily   atorvastatin (LIPITOR) 10 MG tablet Take 10 mg by mouth daily   hydrochlorothiazide (HYDRODIURIL) 12.5 MG tablet Take 12.5 mg by mouth daily   losartan (COZAAR) 100 MG tablet Take 100 mg by mouth daily              Physical Examination:   Temp:  [97.3  F (36.3  C)-99.3  F (37.4  C)] 98.7  F (37.1  C)  Pulse:  [] 96  Heart Rate:  [] 113  Resp:  [11-27] 18  BP: ()/() 102/72  MAP:  [45 mmHg-104 mmHg] 78 mmHg  Arterial Line BP: ()/(33-89) 81/77  FiO2 (%):  [40 %-100 %] 40 %  SpO2:  [85 %-100 %] 97 %      Intake/Output Summary (Last 24 hours) at 4/19/2019  0943  Last data filed at 4/19/2019 0800  Gross per 24 hour   Intake 5077.17 ml   Output 945 ml   Net 4132.17 ml         Wt Readings from Last 4 Encounters:   04/19/19 77.1 kg (169 lb 15.6 oz)     Arterial Line BP: ()/(33-89) 81/77  MAP:  [45 mmHg-104 mmHg] 78 mmHg  BP - Mean:  [] 82  Ventilation Mode: CMV/AC  (Continuous Mandatory Ventilation/ Assist Control)  FiO2 (%): 40 %  Rate Set (breaths/minute): 18 breaths/min  Tidal Volume Set (mL): 450 mL  PEEP (cm H2O): 5 cmH2O  Oxygen Concentration (%): 40 %  Resp: 18    No lab results found in last 7 days.    GEN: no acute distress, sedated   HEENT: head ncat, sclera anicteric, OP patent, trachea midline   PULM: unlabored synchronous with vent, clear anteriorly    CV/COR: RRR S1S2 no gallop,  No rub, no murmur  ABD: soft nontender, hypoactive bowel sounds, no mass  EXT:  No Edema;   Warm x4  NEURO: sedated  SKIN: no obvious rash  LINES: clean, dry intact         Data:   All data and imaging reviewed     ROUTINE ICU LABS (Last four results)  CMP  Recent Labs   Lab 04/19/19  0408 04/18/19  2150 04/18/19  1253 04/17/19  1818 04/17/19  0805     --  136  --  129*   POTASSIUM 4.5 4.4 3.7  --  3.9   CHLORIDE 108  --  105  --  96   CO2 20  --  20  --  22   ANIONGAP 9  --  11  --  11   *  --  139*  --  136*   BUN 36*  --  24  --  25   CR 0.93  --  0.67  --  0.84   GFRESTIMATED 54*  --  77  --  61   GFRESTBLACK 63  --  89  --  70   LULU 8.1*  --  8.8  --  9.7   MAG 1.9  --  1.9  --   --    PHOS 2.5  --  2.6  --   --    PROTTOTAL  --   --   --  6.4* 7.4   ALBUMIN  --   --   --  2.5* 3.0*   BILITOTAL  --   --   --  0.8 0.8   ALKPHOS  --   --   --  122 128   AST  --   --   --  26 23   ALT  --   --   --  29 30     CBC  Recent Labs   Lab 04/19/19  0408 04/18/19  1253 04/17/19  0805   WBC 13.6* 10.6 14.7*   RBC 3.21* 3.52* 3.65*   HGB 9.8* 10.9* 11.4*   HCT 27.7* 30.5* 31.6*   MCV 86 87 87   MCH 30.5 31.0 31.2   MCHC 35.4 35.7 36.1   RDW 14.7 14.5 14.1   *  137* 199     INR  Recent Labs   Lab 04/17/19  0805   INR 1.20*     Arterial Blood GasNo lab results found in last 7 days.    All cultures:  Recent Labs   Lab 04/19/19  0417 04/19/19  0408 04/18/19  1534 04/18/19  1117 04/17/19  2215 04/17/19  2210 04/17/19  1003 04/17/19  1001   CULT No growth after 2 hours No growth after 2 hours Culture negative monitoring continues  Cultured on the 1st day of incubation:  Gram positive cocci in clusters  *  Critical Value/Significant Value, preliminary result only, called to and read back by  Mariaa Robledo RN [id number] 4.19.19 [facility]   Culture in progress Cultured on the 1st day of incubation:  Gram positive cocci in clusters  *  Critical Value/Significant Value, preliminary result only, called to and read back by  Vane Guzman RN at 9:10am 4/18/2019 ()   Cultured on the 1st day of incubation:  Gram positive cocci in clusters  *  Critical Value/Significant Value, preliminary result only, called to and read back by  Maday Wynne RN at 9:55am 4/18/19 ()   Cultured on the 1st day of incubation:  Staphylococcus aureus  *  Critical Value/Significant Value, preliminary result only, called to and read back by  TRA VERA RN [id number] 4.17.19 ND    (Note)  POSITIVE for STAPHYLOCOCCUS AUREUS and NEGATIVE for the mecA gene  (not MRSA) by Verigene multiplex nucleic acid test. The mecA gene was  not detected. Final identification and antimicrobial susceptibility  testing will be verified by standard methods.    Specimen tested with Verigene multiplex, gram-positive blood culture  nucleic acid test for the following targets: Staph aureus, Staph  epidermidis, Staph lugdunensis, other Staph species, Enterococcus  faecalis, Enterococcus faecium, Streptococcus species, S. agalactiae,  S. anginosus grp., S. pneumoniae, S. pyogenes, Listeria sp., mecA  (methicillin resistance) and Martin/B (vancomycin resistance).    Critical Value/Significant Value called to and read back by Vaishali Weaver RN on  04.17.2019 at 2358.  JRT   Cultured on the 1st day of incubation:  Staphylococcus aureus  *  Critical Value/Significant Value, preliminary result only, called to and read back by  TRA VERA, RN [id number] 4.17.19 NDP    Susceptibility testing done on previous specimen     Recent Results (from the past 24 hour(s))   XR Joint Aspiration Major Left    Narrative    EXAM: XR JOINT ASPIRATION MAJOR LEFT  4/18/2019 11:32 AM       History:  Left hip pain - left total hip arthroplasty - fever - rule  out septic hip.     PROCEDURE: The risks (including bleeding, infection, and allergy to  contrast and medications) and benefits of the procedure were explained  to the patient and consent was obtained from the patient's daughter  due to the patient's mental status.  Using sterile technique and  fluoroscopic guidance, a #20 gauge needle was placed into the Left hip  joint using an anterior approach.  2 mL of pink-tinged white thick  cloudy fluid was aspirated.  No initial complication. Fluid sent to  lab for analysis.      Fluoroscopy time: 0.1 minutes  Number of Images: 1  Medications used: 3 mL Lidocaine 1% intradermally       Impression    IMPRESSION:  Technically successful left hip aspiration.     LISY HYDE PA-C   MRI Brain w & w/o contrast    Narrative    MRI OF THE BRAIN WITHOUT AND WITH CONTRAST 4/18/2019 12:15 PM     COMPARISON: Head CT 4/17/2019.    HISTORY: Focal neuro deficit, more than 6 hours, stroke suspected.     TECHNIQUE: Axial diffusion-weighted with ADC map, axial T2-weighted  with fat saturation, axial T1-weighted, axial turboFLAIR and coronal  T1-weighted images of the brain were acquired without intravenous  contrast.  Following intravenous administration of gadolinium (7 mL  Gadavist), axial T1-weighted images of the brain were acquired.     FINDINGS: There are numerous tiny and small ischemic infarcts  scattered throughout the cerebral and cerebellar hemispheres  bilaterally including moderate-sized  cortical infarcts at the  frontoparietal junction on the right and posterior occipital lobe on  the left. There are also infarcts involving the right thalamus and  left caudate head. Given these infarcts are scattered throughout  multiple vascular distributions, embolic infarcts from a central  embolic source should be considered.    There is moderate diffuse cerebral volume loss. There are numerous  scattered focal and patchy periventricular areas of abnormal T2 signal  hyperintensity in the cerebral white matter bilaterally that are  consistent with sequela of chronic small vessel ischemic disease. Most  of the recent ischemic infarcts also demonstrate abnormal T2 signal  hyperintensity suggesting they are greater than 6 hours old.    The ventricles and basal cisterns are within normal limits in  configuration given the degree of cerebral volume loss. There is no  midline shift. There are no extra-axial fluid collections. There is no  evidence for acute intracranial hemorrhage. There is no abnormal  contrast enhancement in the brain or its coverings.    There is no sinusitis or mastoiditis.      Impression    IMPRESSION:  1. Numerous scattered infarcts involving the cerebral and cerebellar  hemispheres bilaterally and basal ganglia bilaterally consistent with  embolic infarcts from a central embolic source.  2. Diffuse cerebral volume loss and cerebral white matter changes  consistent with chronic small vessel ischemic disease.    BARON ZEPEDA MD     Labs personally reviewed/interpreted, see a/p.    Billing: This patient is critically ill: Yes. Total critical care time today 40 min.

## 2019-04-19 NOTE — PROGRESS NOTES
Orthopedic Surgery  Belgica Klein  2019  Admit Date:  2019  POD 1 Day Post-Op  S/P Procedure(s):  COMBINED IRRIGATION AND DEBRIDEMENT LEFT HIP.    Patient resting comfortably in bed.      Patient recently extubated did arouse when examining LE  Vital Sign Ranges  Temperature Temp  Av.1  F (36.7  C)  Min: 97.3  F (36.3  C)  Max: 98.9  F (37.2  C)   Blood pressure Systolic (24hrs), Av , Min:67 , Max:149        Diastolic (24hrs), Av, Min:44, Max:118      Pulse Pulse  Av.8  Min: 86  Max: 160   Respirations Resp  Av.4  Min: 11  Max: 27   Pulse oximetry SpO2  Av %  Min: 85 %  Max: 100 %       Dressing is clean, dry, and intact.   Hemovac patent   Minimal erythema of the surrounding skin.   Bilateral calves are soft  Bilateral lower extremity is NVI.  +Dp pulse      Labs:  Recent Labs   Lab Test 19  0408 19  2150 19  1253   POTASSIUM 4.5 4.4 3.7     Recent Labs   Lab Test 19  0408 19  1253 19  0805   HGB 9.8* 10.9* 11.4*     Recent Labs   Lab Test 19  0805   INR 1.20*     Recent Labs   Lab Test 19  0408 19  1253 19  0805   * 137* 199       A/P  1. Plan   Continue ASA for DVT prophylaxis.     Mobilize with PT/OT once able - just weaned off vent   WBAT when able.     Continue current pain regiment.   Leave dressing intact   Discontinue drain tomorrow    Shylabiju Suggs PA-C

## 2019-04-19 NOTE — PROGRESS NOTES
[facility] ICU RESPIRATORY NOTE  Date of Admission: 4/17/2019  Date of Intubation (most recent): 4/18/2019  Reason for Mechanical Ventilation:Airway Protection   Number of Days on Mechanical Ventilation:2  Met Criteria for Pressure Support Trial: no  Length of Pressure Support Trial:  Reason for Stopping Pressure Support Trial:  Reason for No Pressure Support Trial:  Ventilation Mode: CMV/AC  (Continuous Mandatory Ventilation/ Assist Control)  FiO2 (%): 40 %  Rate Set (breaths/minute): 18 breaths/min  Tidal Volume Set (mL): 450 mL  PEEP (cm H2O): 5 cmH2O  Oxygen Concentration (%): 100 %  Resp: 12        ABG Results:  No lab results found in last 7 days.      Plan:   Continue full vent support tonight , assess for weaning readiness daily

## 2019-04-19 NOTE — PROVIDER NOTIFICATION
Pt was previously in Afib with RVR after OR. Pt now noted to be in Afib with CVR. Notified ad Mckeon to hold amio bolus and gtt until necessary.     Also notified him of her BP being labile while on Levophed and while trying to titrate. Dr. Orellana ordered a 1 time 500ml bolus of LR. Will continue to monitor.

## 2019-04-19 NOTE — PLAN OF CARE
Patient noted to be intubated, sedated.  Will complete SLP orders at this time and recommend re-consult 24 hours s/p extubation, as appropriate.

## 2019-04-19 NOTE — CONSULTS
Care Transition Initial Assessment - SW     Met with: PATIENT,FAMILY    Active Problems:    Acute CVA (cerebrovascular accident) (H)    Septic hip (H)       DATA  Lives With: alone         Description of Support System: Supportive, Involved  Who is your support system?: Children  Support Assessment: Adequate family and caregiver support, Adequate social supports.   Identified issues/concerns regarding health management: Housing/lodging needs (may be re consulted for discharge planning).    ASSESSMENT  Cognitive Status:  Unable to assess  Concerns to be addressed: Housing/lodging needs (discharge planning?)    SW reviewed chart and met with patient's daughter, Tea.  Pt was admitted 4/17/19 with CVA. Anticipated discharge date: Unknown. SW introduced self and role. Initial consult for: Housing/lodging needs may have come into play when patient was admitted 4/17 with acute CVA, however, pt was found to have hip pain upon admission.  Pt 's hip was aspirated which developed into a need for surgery. Subsequently, patient was transferred to the ICU.  Tea explains patient had been completely independent up to this time, living in [place], [place] in her own home.  Tea also reported pt has a home in [place] as well, but was in [place] visiting both her daughters. Tea stated she lives in [place], near the [facility]. As discharge plans remain unknown at this time, we discussed the various possibilities that may be recommended. Daughter aware SW will continue to follow and assist as needed.        PLAN  Financial costs for the patient includes: N/A .  Patient given options and choices for discharge: N/A at this time .  Patient/family is agreeable to the plan?  N/A  Patient Goals and Preferences: Discharge plan unknown at this time .  Patient anticipates discharging to:  Unknown at this time .    Continue to assist as needed.    MOISÉS Ochoa

## 2019-04-19 NOTE — PROGRESS NOTES
Family now declining LUTHER.  Will wean to extubate.    ADDENDUM:  Did well on sbt and extubated.  Resting comfortably.  Family desires code status reversion to DNR/DNI.

## 2019-04-19 NOTE — PLAN OF CARE
Pt received from OR at 1630, heart rate 160, bp 80's, neosynephrine infusing, new medication levophed readied for new central line in right neck.  Gave another liter of LR, started levophed, titrated off of david, started propofol and discontinued precedex.  Pt with atrial fib with controlled rate at 1800, BP with map greater than 65, able to titrate pressor requirement.  Handed off to next shift to continue to titrate away pressor.

## 2019-04-19 NOTE — PROGRESS NOTES
[facility]    Infectious Disease Progress Note    Date of Service (when I saw the patient): 04/19/2019     Assessment & Plan   Belgica Klein is a 89 year old female who was admitted on 4/17/2019.        Impression:  1. 89 y.o with hypertension, TIA, hyperlipidemia, and recurrent UTIs.   2. Admitted with  change in mental state and aphasia, fever.   3. Also has had pain in  left hip. Bilateral GURJIT.   4. In the hospital blood cultures positive for MSSA.   5. The left hip aspiration done and fluid grossly infected with [id number] cells 72 % neutrophils. Going to the OR today for I and d. Cultures from the hip fluid Staph aures.       Recommendations:   Continue on Ancef.   Avoid long term IV lines for now. Noted a new central line on the right neck, please remove as able.   Will need daily blood cultures.   B/l palm lesions, ? Embolic, TTE negative, LUTHER offered but refused by family.                 Bertram Carrillo MD    Interval History   Afebrile   Extubated   Still lethargic     Family updated bedside.     Physical Exam   Temp: 98.7  F (37.1  C) Temp src: Axillary BP: 102/72 Pulse: 96 Heart Rate: 113 Resp: 18 SpO2: 97 % O2 Device: Oscillator Vent Settings Oxygen Delivery: 4 LPM  Vitals:    04/17/19 0810 04/18/19 0500 04/19/19 0600   Weight: 73.1 kg (161 lb 2.5 oz) 70.8 kg (156 lb 1.4 oz) 77.1 kg (169 lb 15.6 oz)     Vital Signs with Ranges  Temp:  [97.3  F (36.3  C)-99.3  F (37.4  C)] 98.7  F (37.1  C)  Pulse:  [] 96  Heart Rate:  [] 113  Resp:  [11-27] 18  BP: ()/() 102/72  MAP:  [45 mmHg-104 mmHg] 78 mmHg  Arterial Line BP: ()/(33-89) 81/77  FiO2 (%):  [40 %-100 %] 40 %  SpO2:  [85 %-100 %] 97 %    Constitutional: lethargic   Lungs: Clear to auscultation bilaterally, no crackles or wheezing  Cardiovascular: Regular rate and rhythm, normal S1 and S2, and no murmur noted  Abdomen: Normal bowel sounds, soft, non-distended, non-tender  Skin: No rashes, no cyanosis, no  edema  Other:    Medications     amiodarone Stopped (04/18/19 2301)     - MEDICATION INSTRUCTIONS -       norepinephrine 0.1 mcg/kg/min (04/19/19 0422)     phenylephrine Stopped (04/18/19 1715)     propofol (DIPRIVAN) infusion 20 mcg/kg/min (04/19/19 0217)     sodium chloride 30 mL/hr at 04/18/19 1928     sodium chloride 100 mL/hr at 04/19/19 0626       acetaminophen  975 mg Oral Q8H     amiodarone  150 mg Intravenous Once     aspirin  325 mg Oral Daily    Or     aspirin  300 mg Rectal Daily     atorvastatin  40 mg Oral or NG Tube Daily at 8 pm     IV Antibiotic builder   Intravenous Q8H     chlorhexidine  15 mL Mouth/Throat Q12H     gadobutrol  10 mL Intravenous Once     insulin aspart  1-7 Units Subcutaneous TID AC     insulin aspart  1-5 Units Subcutaneous At Bedtime     pantoprazole  40 mg Oral or Feeding Tube QAM AC    Or     pantoprazole (PROTONIX) IV  40 mg Intravenous QAM AC     sodium chloride (PF)  3 mL Intracatheter Q8H       Data   All microbiology laboratory data reviewed.  Recent Labs   Lab Test 04/19/19  0408 04/18/19  1253 04/17/19  0805   WBC 13.6* 10.6 14.7*   HGB 9.8* 10.9* 11.4*   HCT 27.7* 30.5* 31.6*   MCV 86 87 87   * 137* 199     Recent Labs   Lab Test 04/19/19  0408 04/18/19  1253 04/17/19  0805   CR 0.93 0.67 0.84     Recent Labs   Lab Test 04/18/19  1253   SED 80*     Recent Labs   Lab Test 04/19/19  0417 04/19/19  0408 04/18/19  1534 04/18/19  1117 04/17/19  2215 04/17/19  2210 04/17/19  1003 04/17/19  1001   CULT No growth after 2 hours No growth after 2 hours Culture negative monitoring continues  Cultured on the 1st day of incubation:  Gram positive cocci in clusters  *  Critical Value/Significant Value, preliminary result only, called to and read back by  Mariaa Robledo RN [id number] 4.19.19 SUSI   Moderate growth  Staphylococcus aureus  Susceptibility testing in progress  *  Critical Value/Significant Value called to and read back by  Kash Brown RN [facility] @ 1008 4.19.19 MITCHEL    Cultured on the 1st day of incubation:  Gram positive cocci in clusters  *  Critical Value/Significant Value, preliminary result only, called to and read back by  Vane Guzman RN at 9:10am 4/18/2019 ()   Cultured on the 1st day of incubation:  Gram positive cocci in clusters  *  Critical Value/Significant Value, preliminary result only, called to and read back by  Maday Wynne RN at 9:55am 4/18/19 ()   Cultured on the 1st day of incubation:  Staphylococcus aureus  *  Critical Value/Significant Value, preliminary result only, called to and read back by  TRA VERA RN [id number] 4.17.19 NDP    (Note)  POSITIVE for STAPHYLOCOCCUS AUREUS and NEGATIVE for the mecA gene  (not MRSA) by Collaaj multiplex nucleic acid test. The mecA gene was  not detected. Final identification and antimicrobial susceptibility  testing will be verified by standard methods.    Specimen tested with Horsealotigene multiplex, gram-positive blood culture  nucleic acid test for the following targets: Staph aureus, Staph  epidermidis, Staph lugdunensis, other Staph species, Enterococcus  faecalis, Enterococcus faecium, Streptococcus species, S. agalactiae,  S. anginosus grp., S. pneumoniae, S. pyogenes, Listeria sp., mecA  (methicillin resistance) and Martin/B (vancomycin resistance).    Critical Value/Significant Value called to and read back by Vaishali Weaver RN on 04.17.2019 at 2358.  JRT   Cultured on the 1st day of incubation:  Staphylococcus aureus  *  Critical Value/Significant Value, preliminary result only, called to and read back by  TRA VERA RN [id number] 4.17.19 NDP    Susceptibility testing done on previous specimen

## 2019-04-19 NOTE — PROVIDER NOTIFICATION
Provider Notified of Moderate Growth of Staph Aureus in hip culture and blood culture.  Pt is on Ancef.

## 2019-04-19 NOTE — PROVIDER NOTIFICATION
Notified Dr. Orellana of low urine output 25ml over 2 hours. Orders to give 500 LR bolus. Will continue to monitor.

## 2019-04-20 ENCOUNTER — APPOINTMENT (OUTPATIENT)
Dept: PHYSICAL THERAPY | Facility: CLINIC | Age: 84
DRG: 981 | End: 2019-04-20
Attending: INTERNAL MEDICINE
Payer: MEDICARE

## 2019-04-20 ENCOUNTER — APPOINTMENT (OUTPATIENT)
Dept: SPEECH THERAPY | Facility: CLINIC | Age: 84
DRG: 981 | End: 2019-04-20
Attending: INTERNAL MEDICINE
Payer: MEDICARE

## 2019-04-20 ENCOUNTER — APPOINTMENT (OUTPATIENT)
Dept: GENERAL RADIOLOGY | Facility: CLINIC | Age: 84
DRG: 981 | End: 2019-04-20
Attending: SURGERY
Payer: MEDICARE

## 2019-04-20 ENCOUNTER — APPOINTMENT (OUTPATIENT)
Dept: CT IMAGING | Facility: CLINIC | Age: 84
DRG: 981 | End: 2019-04-20
Payer: MEDICARE

## 2019-04-20 LAB
ANION GAP SERPL CALCULATED.3IONS-SCNC: 7 MMOL/L (ref 3–14)
BACTERIA SPEC CULT: ABNORMAL
BUN SERPL-MCNC: 38 MG/DL (ref 7–30)
CALCIUM SERPL-MCNC: 8.3 MG/DL (ref 8.5–10.1)
CHLORIDE SERPL-SCNC: 108 MMOL/L (ref 94–109)
CO2 SERPL-SCNC: 22 MMOL/L (ref 20–32)
CREAT SERPL-MCNC: 0.7 MG/DL (ref 0.52–1.04)
ERYTHROCYTE [DISTWIDTH] IN BLOOD BY AUTOMATED COUNT: 15 % (ref 10–15)
GFR SERPL CREATININE-BSD FRML MDRD: 76 ML/MIN/{1.73_M2}
GLUCOSE BLDC GLUCOMTR-MCNC: 123 MG/DL (ref 70–99)
GLUCOSE BLDC GLUCOMTR-MCNC: 133 MG/DL (ref 70–99)
GLUCOSE BLDC GLUCOMTR-MCNC: 139 MG/DL (ref 70–99)
GLUCOSE SERPL-MCNC: 145 MG/DL (ref 70–99)
HCT VFR BLD AUTO: 27.2 % (ref 35–47)
HGB BLD-MCNC: 9.7 G/DL (ref 11.7–15.7)
INTERPRETATION ECG - MUSE: NORMAL
Lab: ABNORMAL
MAGNESIUM SERPL-MCNC: 2.3 MG/DL (ref 1.6–2.3)
MCH RBC QN AUTO: 30.7 PG (ref 26.5–33)
MCHC RBC AUTO-ENTMCNC: 35.7 G/DL (ref 31.5–36.5)
MCV RBC AUTO: 86 FL (ref 78–100)
PHOSPHATE SERPL-MCNC: 2.4 MG/DL (ref 2.5–4.5)
PLATELET # BLD AUTO: 102 10E9/L (ref 150–450)
POTASSIUM SERPL-SCNC: 4.4 MMOL/L (ref 3.4–5.3)
RBC # BLD AUTO: 3.16 10E12/L (ref 3.8–5.2)
SODIUM SERPL-SCNC: 137 MMOL/L (ref 133–144)
SPECIMEN SOURCE: ABNORMAL
WBC # BLD AUTO: 11.1 10E9/L (ref 4–11)

## 2019-04-20 PROCEDURE — 25000125 ZZHC RX 250: Performed by: INTERNAL MEDICINE

## 2019-04-20 PROCEDURE — 85027 COMPLETE CBC AUTOMATED: CPT | Performed by: INTERNAL MEDICINE

## 2019-04-20 PROCEDURE — 92526 ORAL FUNCTION THERAPY: CPT | Mod: GN

## 2019-04-20 PROCEDURE — 20000003 ZZH R&B ICU

## 2019-04-20 PROCEDURE — 84100 ASSAY OF PHOSPHORUS: CPT | Performed by: INTERNAL MEDICINE

## 2019-04-20 PROCEDURE — 99233 SBSQ HOSP IP/OBS HIGH 50: CPT | Performed by: INTERNAL MEDICINE

## 2019-04-20 PROCEDURE — 40000275 ZZH STATISTIC RCP TIME EA 10 MIN

## 2019-04-20 PROCEDURE — 00000146 ZZHCL STATISTIC GLUCOSE BY METER IP

## 2019-04-20 PROCEDURE — A9270 NON-COVERED ITEM OR SERVICE: HCPCS | Performed by: INTERNAL MEDICINE

## 2019-04-20 PROCEDURE — 99233 SBSQ HOSP IP/OBS HIGH 50: CPT | Mod: GC | Performed by: PSYCHIATRY & NEUROLOGY

## 2019-04-20 PROCEDURE — 25800030 ZZH RX IP 258 OP 636: Performed by: SURGERY

## 2019-04-20 PROCEDURE — 97162 PT EVAL MOD COMPLEX 30 MIN: CPT | Mod: GP

## 2019-04-20 PROCEDURE — 25800030 ZZH RX IP 258 OP 636: Performed by: INTERNAL MEDICINE

## 2019-04-20 PROCEDURE — 97530 THERAPEUTIC ACTIVITIES: CPT | Mod: GP

## 2019-04-20 PROCEDURE — 25000125 ZZHC RX 250: Performed by: SURGERY

## 2019-04-20 PROCEDURE — 83735 ASSAY OF MAGNESIUM: CPT | Performed by: INTERNAL MEDICINE

## 2019-04-20 PROCEDURE — 70450 CT HEAD/BRAIN W/O DYE: CPT

## 2019-04-20 PROCEDURE — 80048 BASIC METABOLIC PNL TOTAL CA: CPT | Performed by: INTERNAL MEDICINE

## 2019-04-20 PROCEDURE — 25000128 H RX IP 250 OP 636: Performed by: INTERNAL MEDICINE

## 2019-04-20 PROCEDURE — 25000128 H RX IP 250 OP 636: Performed by: SURGERY

## 2019-04-20 PROCEDURE — C9113 INJ PANTOPRAZOLE SODIUM, VIA: HCPCS | Performed by: INTERNAL MEDICINE

## 2019-04-20 PROCEDURE — P9041 ALBUMIN (HUMAN),5%, 50ML: HCPCS | Performed by: SURGERY

## 2019-04-20 PROCEDURE — 94640 AIRWAY INHALATION TREATMENT: CPT

## 2019-04-20 PROCEDURE — 97110 THERAPEUTIC EXERCISES: CPT | Mod: GP

## 2019-04-20 PROCEDURE — 25000132 ZZH RX MED GY IP 250 OP 250 PS 637: Performed by: INTERNAL MEDICINE

## 2019-04-20 PROCEDURE — 71045 X-RAY EXAM CHEST 1 VIEW: CPT

## 2019-04-20 PROCEDURE — 87040 BLOOD CULTURE FOR BACTERIA: CPT | Performed by: INTERNAL MEDICINE

## 2019-04-20 PROCEDURE — 92610 EVALUATE SWALLOWING FUNCTION: CPT | Mod: GN

## 2019-04-20 RX ORDER — ALBUTEROL SULFATE 0.83 MG/ML
2.5 SOLUTION RESPIRATORY (INHALATION)
Status: DISCONTINUED | OUTPATIENT
Start: 2019-04-20 | End: 2019-04-30 | Stop reason: HOSPADM

## 2019-04-20 RX ORDER — ALBUMIN, HUMAN INJ 5% 5 %
12.5 SOLUTION INTRAVENOUS ONCE
Status: COMPLETED | OUTPATIENT
Start: 2019-04-20 | End: 2019-04-20

## 2019-04-20 RX ORDER — ALBUTEROL SULFATE 90 UG/1
2 AEROSOL, METERED RESPIRATORY (INHALATION) EVERY 6 HOURS PRN
Status: DISCONTINUED | OUTPATIENT
Start: 2019-04-20 | End: 2019-04-30 | Stop reason: HOSPADM

## 2019-04-20 RX ORDER — FUROSEMIDE 10 MG/ML
20 INJECTION INTRAMUSCULAR; INTRAVENOUS EVERY 12 HOURS
Status: COMPLETED | OUTPATIENT
Start: 2019-04-20 | End: 2019-04-20

## 2019-04-20 RX ADMIN — FUROSEMIDE 20 MG: 10 INJECTION, SOLUTION INTRAVENOUS at 20:24

## 2019-04-20 RX ADMIN — FUROSEMIDE 20 MG: 10 INJECTION, SOLUTION INTRAVENOUS at 11:12

## 2019-04-20 RX ADMIN — SODIUM CHLORIDE: 9 INJECTION, SOLUTION INTRAVENOUS at 18:25

## 2019-04-20 RX ADMIN — HYDROMORPHONE HYDROCHLORIDE 0.5 MG: 1 INJECTION, SOLUTION INTRAMUSCULAR; INTRAVENOUS; SUBCUTANEOUS at 20:31

## 2019-04-20 RX ADMIN — AMIODARONE HYDROCHLORIDE 0.5 MG/MIN: 50 INJECTION, SOLUTION INTRAVENOUS at 16:08

## 2019-04-20 RX ADMIN — CEFAZOLIN: 1 INJECTION, POWDER, FOR SOLUTION INTRAMUSCULAR; INTRAVENOUS at 13:06

## 2019-04-20 RX ADMIN — ALBUMIN HUMAN 12.5 G: 0.05 INJECTION, SOLUTION INTRAVENOUS at 02:26

## 2019-04-20 RX ADMIN — SODIUM PHOSPHATE, MONOBASIC, MONOHYDRATE 15 MMOL: 276; 142 INJECTION, SOLUTION INTRAVENOUS at 05:56

## 2019-04-20 RX ADMIN — CEFAZOLIN: 1 INJECTION, POWDER, FOR SOLUTION INTRAMUSCULAR; INTRAVENOUS at 03:26

## 2019-04-20 RX ADMIN — CEFAZOLIN: 1 INJECTION, POWDER, FOR SOLUTION INTRAMUSCULAR; INTRAVENOUS at 22:13

## 2019-04-20 RX ADMIN — ASPIRIN 300 MG: 300 SUPPOSITORY RECTAL at 10:32

## 2019-04-20 RX ADMIN — AMIODARONE HYDROCHLORIDE 0.5 MG/MIN: 50 INJECTION, SOLUTION INTRAVENOUS at 07:32

## 2019-04-20 RX ADMIN — PANTOPRAZOLE SODIUM 40 MG: 40 INJECTION, POWDER, FOR SOLUTION INTRAVENOUS at 06:52

## 2019-04-20 RX ADMIN — AMIODARONE HYDROCHLORIDE 1 MG/MIN: 50 INJECTION, SOLUTION INTRAVENOUS at 01:44

## 2019-04-20 RX ADMIN — ALBUTEROL SULFATE 2.5 MG: 2.5 SOLUTION RESPIRATORY (INHALATION) at 23:12

## 2019-04-20 ASSESSMENT — ACTIVITIES OF DAILY LIVING (ADL)
ADLS_ACUITY_SCORE: 15
ADLS_ACUITY_SCORE: 20

## 2019-04-20 ASSESSMENT — MIFFLIN-ST. JEOR: SCORE: 1240.5

## 2019-04-20 NOTE — PROGRESS NOTES
Spoke with Ashley regarding a-fib -160's, more frequent bursts. Currently on levo to maintain BP. Okay to given Amio bouls & start gtt as previously outlined.

## 2019-04-20 NOTE — PROGRESS NOTES
Critical Care  Note      04/20/2019    Name: Belgica Klein MRN#: [id number]   Age: 89 year old YOB: 1928     [facility]                 Problem List:   Active Problems:    Acute CVA (cerebrovascular accident) (H)    Septic hip (H)           Summary/Hospital Course:     89F who presented 4/17 with expressive aphasia and encephalopathy, found to have MSSA bacteremia due to infection of a septic hip, now s/p washout by orthopedics on 4/18.  Found to have evidence of embolic strokes on MRI head.  Unable to obtain further history as pt is intubated/sedated.    No events overnight.  Successfully extubated yesterday and now off pressors since last night.  On amio drip for a fib.  Doing well this morning.  Denies chest pain, shortness of breath, vomiting, abdominal pain.      Assessment and plan :     Belgica Klein IS a 89 year old female admitted on 4/17/2019 for MSSA bacteremia/septic hip.   I have personally reviewed the daily labs, imaging studies, cultures and discussed the case with referring physician and consulting physicians.     My assessment and plan by system for this patient is as follows:    Neurology/Psychiatry:   1.  Encepahlopathy:  Due to recent strokes; improved today.  2.  Analgesia:  diluadid prn  3.  Embolic strokes:  Seen on mri.  No cause noted on TTE.     Cardiovascular:   1. Shock:  Septic.  Resolved.  2.  A fib with RVR:  Improved with amio.  K ok 4.4, mag ok 2.3    Pulmonary/Ventilator Management:   1. Hypoxemia:  Doing well on nasal cannula.  Probably mild pulmonary edema after volume resuscitation.  Gentle diuresis today.    GI and Nutrition :   1. Swallow eval, then ADAT.  2.  ppi for pud    Renal/Fluids/Electrolytes:   1. WENDI:  Creat down to 0.70.  2. Electrolytes ok.    3. Acid/base status ok-- bicarb 22    Infectious Disease:   1. MSSA bacteremia and septic hip:  Appreciate ID input.  Continue ancef.    Endocrine:   1. fsg acceptable for now.  Prn insulin vs  glucose.    Hematology/Oncology:   1. Wbc 11.1 resolving.  2.  hgb stable at 9.7.  3.  pltlts ok 102.     IV/Access:   1. Venous access - TLC    ICU Prophylaxis:   1. DVT: mechanical, chemo when cleared by ortho  2. Wound care  - per ortho  3. Feeding - see above  4. Family Update: adult children at bedside.    5. Disposition - may leave icu today           Medical History:     No past medical history on file.  No past surgical history on file.  Social History     Socioeconomic History     Marital status:      Spouse name: Not on file     Number of children: Not on file     Years of education: Not on file     Highest education level: Not on file   Occupational History     Not on file   Social Needs     Financial resource strain: Not on file     Food insecurity:     Worry: Not on file     Inability: Not on file     Transportation needs:     Medical: Not on file     Non-medical: Not on file   Tobacco Use     Smoking status: Not on file   Substance and Sexual Activity     Alcohol use: Not on file     Drug use: Not on file     Sexual activity: Not on file   Lifestyle     Physical activity:     Days per week: Not on file     Minutes per session: Not on file     Stress: Not on file   Relationships     Social connections:     Talks on phone: Not on file     Gets together: Not on file     Attends Religion service: Not on file     Active member of club or organization: Not on file     Attends meetings of clubs or organizations: Not on file     Relationship status: Not on file     Intimate partner violence:     Fear of current or ex partner: Not on file     Emotionally abused: Not on file     Physically abused: Not on file     Forced sexual activity: Not on file   Other Topics Concern     Not on file   Social History Narrative     Not on file        Allergies   Allergen Reactions     Penicillins Hives              Key Medications:       acetaminophen  975 mg Oral Q8H     aspirin  325 mg Oral Daily    Or     aspirin   300 mg Rectal Daily     atorvastatin  10 mg Oral or NG Tube Daily at 8 pm     IV Antibiotic builder   Intravenous Q8H     furosemide  20 mg Intravenous Q12H     gadobutrol  10 mL Intravenous Once     insulin aspart  1-7 Units Subcutaneous TID AC     insulin aspart  1-5 Units Subcutaneous At Bedtime     pantoprazole  40 mg Oral or Feeding Tube QAM AC     sodium chloride (PF)  3 mL Intracatheter Q8H       amiodarone 0.5 mg/min (04/20/19 0732)     - MEDICATION INSTRUCTIONS -       norepinephrine Stopped (04/19/19 2115)     phenylephrine Stopped (04/18/19 1715)     propofol (DIPRIVAN) infusion Stopped (04/19/19 1144)     sodium chloride 30 mL/hr at 04/19/19 2000     sodium chloride 100 mL/hr at 04/19/19 2221        Home Meds    No current facility-administered medications on file prior to encounter.   Current Outpatient Medications on File Prior to Encounter:  amLODIPine (NORVASC) 5 MG tablet Take 5 mg by mouth daily   aspirin 81 MG EC tablet Take 81 mg by mouth daily   atorvastatin (LIPITOR) 10 MG tablet Take 10 mg by mouth daily   hydrochlorothiazide (HYDRODIURIL) 12.5 MG tablet Take 12.5 mg by mouth daily   losartan (COZAAR) 100 MG tablet Take 100 mg by mouth daily              Physical Examination:   Temp:  [97.4  F (36.3  C)-98.5  F (36.9  C)] 97.8  F (36.6  C)  Heart Rate:  [] 85  Resp:  [13-33] 19  BP: ()/() 142/74  FiO2 (%):  [40 %] 40 %  SpO2:  [89 %-100 %] 95 %      Intake/Output Summary (Last 24 hours) at 4/20/2019 1054  Last data filed at 4/20/2019 0600  Gross per 24 hour   Intake 4096.74 ml   Output 1035 ml   Net 3061.74 ml       Wt Readings from Last 4 Encounters:   04/20/19 77.2 kg (170 lb 3.1 oz)     BP - Mean:  [] 111  Ventilation Mode: CPAP/PS  (Continuous positive airway pressure with Pressure Support)  FiO2 (%): 40 %  Rate Set (breaths/minute): 18 breaths/min  Tidal Volume Set (mL): 450 mL  PEEP (cm H2O): 5 cmH2O  Pressure Support (cm H2O): 5 cmH2O  Oxygen Concentration (%):  40 %  Resp: 19    No lab results found in last 7 days.    GEN: no acute distress, pleasant  HEENT: head ncat, sclera anicteric, OP patent, trachea midline   NECK: supple  PULM: unlabored, mildly wheezy (suspect pulm edema related)   CV/COR: RRR S1S2 no gallop,  No rub, no murmur  ABD: soft nontender, hypoactive bowel sounds, no mass  EXT:  mild Edema;   Warm x4  NEURO: awake, alert, makes needs known, moves all extremities.  SKIN: no obvious rash  LINES: clean, dry intact         Data:   All data and imaging reviewed     ROUTINE ICU LABS (Last four results)  CMP  Recent Labs   Lab 04/20/19  0433 04/19/19  0408 04/18/19  2150 04/18/19  1253 04/17/19  1818 04/17/19  0805    137  --  136  --  129*   POTASSIUM 4.4 4.5 4.4 3.7  --  3.9   CHLORIDE 108 108  --  105  --  96   CO2 22 20  --  20  --  22   ANIONGAP 7 9  --  11  --  11   * 153*  --  139*  --  136*   BUN 38* 36*  --  24  --  25   CR 0.70 0.93  --  0.67  --  0.84   GFRESTIMATED 76 54*  --  77  --  61   GFRESTBLACK 88 63  --  89  --  70   LULU 8.3* 8.1*  --  8.8  --  9.7   MAG 2.3 1.9  --  1.9  --   --    PHOS 2.4* 2.5  --  2.6  --   --    PROTTOTAL  --   --   --   --  6.4* 7.4   ALBUMIN  --   --   --   --  2.5* 3.0*   BILITOTAL  --   --   --   --  0.8 0.8   ALKPHOS  --   --   --   --  122 128   AST  --   --   --   --  26 23   ALT  --   --   --   --  29 30     CBC  Recent Labs   Lab 04/20/19 0433 04/19/19  0408 04/18/19  1253 04/17/19  0805   WBC 11.1* 13.6* 10.6 14.7*   RBC 3.16* 3.21* 3.52* 3.65*   HGB 9.7* 9.8* 10.9* 11.4*   HCT 27.2* 27.7* 30.5* 31.6*   MCV 86 86 87 87   MCH 30.7 30.5 31.0 31.2   MCHC 35.7 35.4 35.7 36.1   RDW 15.0 14.7 14.5 14.1   * 125* 137* 199     INR  Recent Labs   Lab 04/17/19  0805   INR 1.20*     Arterial Blood GasNo lab results found in last 7 days.    All cultures:  Recent Labs   Lab 04/20/19  0433 04/19/19  0417 04/19/19  0408 04/18/19  1534 04/18/19  1117 04/17/19  2215 04/17/19  2210 04/17/19  1003  04/17/19  1001   CULT No growth after 2 hours No growth after 22 hours No growth after 22 hours Heavy growth  Staphylococcus aureus  Susceptibility testing done on previous specimen  *  Critical Value/Significant Value, preliminary result only, called to and read back by  Mariaa Robledo RN [id number] 4.19.19 SUSI    Culture negative monitoring continues Moderate growth  Staphylococcus aureus  *  Critical Value/Significant Value called to and read back by  Kash Brown RN [facility] @ 1008 4.19.19 JE   Cultured on the 1st day of incubation:  Staphylococcus aureus  *  Critical Value/Significant Value, preliminary result only, called to and read back by  Vane Guzman RN at 9:10am 4/18/2019 ()    Susceptibility testing done on previous specimen Cultured on the 1st day of incubation:  Staphylococcus aureus  *  Critical Value/Significant Value, preliminary result only, called to and read back by  Maday Wynne RN at 9:55am 4/18/19 ()    Susceptibility testing done on previous specimen Cultured on the 1st day of incubation:  Staphylococcus aureus  *  Critical Value/Significant Value, preliminary result only, called to and read back by  TRA VERA RN [id number] 4.17.19 [facility]    (Note)  POSITIVE for STAPHYLOCOCCUS AUREUS and NEGATIVE for the mecA gene  (not MRSA) by Verigene multiplex nucleic acid test. The mecA gene was  not detected. Final identification and antimicrobial susceptibility  testing will be verified by standard methods.    Specimen tested with Verigene multiplex, gram-positive blood culture  nucleic acid test for the following targets: Staph aureus, Staph  epidermidis, Staph lugdunensis, other Staph species, Enterococcus  faecalis, Enterococcus faecium, Streptococcus species, S. agalactiae,  S. anginosus grp., S. pneumoniae, S. pyogenes, Listeria sp., mecA  (methicillin resistance) and Martin/B (vancomycin resistance).    Critical Value/Significant Value called to and read back by Vaishali Weaver RN on 04.17.2019 at  2358.  JRT   Cultured on the 1st day of incubation:  Staphylococcus aureus  *  Critical Value/Significant Value, preliminary result only, called to and read back by  TRA VERA RN [id number] 4.17.19 NDP    Susceptibility testing done on previous specimen     Recent Results (from the past 24 hour(s))   XR Joint Aspiration Major Left    Narrative    EXAM: XR JOINT ASPIRATION MAJOR LEFT  4/18/2019 11:32 AM       History:  Left hip pain - left total hip arthroplasty - fever - rule  out septic hip.     PROCEDURE: The risks (including bleeding, infection, and allergy to  contrast and medications) and benefits of the procedure were explained  to the patient and consent was obtained from the patient's daughter  due to the patient's mental status.  Using sterile technique and  fluoroscopic guidance, a #20 gauge needle was placed into the Left hip  joint using an anterior approach.  2 mL of pink-tinged white thick  cloudy fluid was aspirated.  No initial complication. Fluid sent to  lab for analysis.      Fluoroscopy time: 0.1 minutes  Number of Images: 1  Medications used: 3 mL Lidocaine 1% intradermally       Impression    IMPRESSION:  Technically successful left hip aspiration.     LISY HYDE PA-C   MRI Brain w & w/o contrast    Narrative    MRI OF THE BRAIN WITHOUT AND WITH CONTRAST 4/18/2019 12:15 PM     COMPARISON: Head CT 4/17/2019.    HISTORY: Focal neuro deficit, more than 6 hours, stroke suspected.     TECHNIQUE: Axial diffusion-weighted with ADC map, axial T2-weighted  with fat saturation, axial T1-weighted, axial turboFLAIR and coronal  T1-weighted images of the brain were acquired without intravenous  contrast.  Following intravenous administration of gadolinium (7 mL  Gadavist), axial T1-weighted images of the brain were acquired.     FINDINGS: There are numerous tiny and small ischemic infarcts  scattered throughout the cerebral and cerebellar hemispheres  bilaterally including moderate-sized cortical  infarcts at the  frontoparietal junction on the right and posterior occipital lobe on  the left. There are also infarcts involving the right thalamus and  left caudate head. Given these infarcts are scattered throughout  multiple vascular distributions, embolic infarcts from a central  embolic source should be considered.    There is moderate diffuse cerebral volume loss. There are numerous  scattered focal and patchy periventricular areas of abnormal T2 signal  hyperintensity in the cerebral white matter bilaterally that are  consistent with sequela of chronic small vessel ischemic disease. Most  of the recent ischemic infarcts also demonstrate abnormal T2 signal  hyperintensity suggesting they are greater than 6 hours old.    The ventricles and basal cisterns are within normal limits in  configuration given the degree of cerebral volume loss. There is no  midline shift. There are no extra-axial fluid collections. There is no  evidence for acute intracranial hemorrhage. There is no abnormal  contrast enhancement in the brain or its coverings.    There is no sinusitis or mastoiditis.      Impression    IMPRESSION:  1. Numerous scattered infarcts involving the cerebral and cerebellar  hemispheres bilaterally and basal ganglia bilaterally consistent with  embolic infarcts from a central embolic source.  2. Diffuse cerebral volume loss and cerebral white matter changes  consistent with chronic small vessel ischemic disease.    BARON ZEPEDA MD     Labs personally reviewed/interpreted, see a/p.    D/w Dr. Cantu of hospitalist service.

## 2019-04-20 NOTE — PROGRESS NOTES
Tele-ICU cross-cover  DOS: 4/19/2019     Notified that patient's heart rate had increased into the 140s-160s sustained; she remains in atrial fibrillation. Day team had previously ordered amiodarone bolus and gtt for this scenario; will proceed with amiodarone as ordered.    Compa Huang MD, PhD  Surgical critical care  April 19, 2019, 10:51 PM     Addendum 4/20/2019, 2:20 AM  UOP trending down the last several hours; was given LR bolus last night with improvement. Will try gentle fluid challenge with albumin.  RB

## 2019-04-20 NOTE — PROGRESS NOTES
04/20/19 1700   Quick Adds   Type of Visit Initial PT Evaluation   Living Environment   Lives With alone   Living Arrangements house   Living Environment Comment Pt has a home in [place] and [place] that she frequently travels between. Level entrance in [place], multi-story in [place].   Self-Care   Usual Activity Tolerance good   Current Activity Tolerance poor   Equipment Currently Used at Home none   Activity/Exercise/Self-Care Comment Pt very active and IND at baseline   Functional Level Prior   Ambulation 1-->assistive equipment   Transferring 0-->independent   Toileting 0-->independent   Bathing 0-->independent   Fall history within last six months no   Which of the above functional risks had a recent onset or change? ambulation;transferring;bathing;toileting;dressing;cognition   Prior Functional Level Comment Recently started using SEC due to hip pain, otherwise IND   General Information   Onset of Illness/Injury or Date of Surgery - Date 04/17/19   Referring Physician Chevy Li MD   Patient/Family Goals Statement Return home when able   Pertinent History of Current Problem (include personal factors and/or comorbidities that impact the POC) 89 year old woman who presents with AMS. CTH showed right postcentral gyrus likely ischemic stroke. MRI showed multiple territory ischemic strokes. She went into Afib with RVR as well. She was found to have left hip infection s/p I&D, POD2.   Precautions/Limitations fall precautions   Weight-Bearing Status - LLE weight-bearing as tolerated   General Info Comments No hip precautions   Cognitive Status Examination   Orientation person   Level of Consciousness lethargic/somnolent   Follows Commands and Answers Questions 50% of the time   Pain Assessment   Patient Currently in Pain Yes, see Vital Sign flowsheet  (Severe L shoulder pain)   Integumentary/Edema   Integumentary/Edema no deficits were identifed   Posture    Posture Comments Unable to assess   Range of Motion  "(ROM)   ROM Comment AROM limited throughout. L UE PROM limited by pain   Strength   Strength Comments Trace L UE/LE, Inconsistent R LE/UE, but appears below 3/5   Bed Mobility   Bed Mobility Comments Total-A. Requires lift   Transfer Skills   Transfer Comments Lift   Gait   Gait Comments Unable   Balance   Balance Comments Total-A for static sitting   Sensory Examination   Sensory Perception Comments Appears to have some LT deficits in L LE, but inconsitant reporting.   General Therapy Interventions   Planned Therapy Interventions bed mobility training;gait training;progressive activity/exercise;home program guidelines;risk factor education;transfer training;strengthening;neuromuscular re-education   Clinical Impression   Criteria for Skilled Therapeutic Intervention yes, treatment indicated   PT Diagnosis Impaired functional mobility   Influenced by the following impairments CVA, hemiparesis, pain   Functional limitations due to impairments Bed mobility, transfers, gait   Clinical Presentation Evolving/Changing   Clinical Presentation Rationale Medical status   Clinical Decision Making (Complexity) Moderate complexity   Therapy Frequency` daily   Predicted Duration of Therapy Intervention (days/wks) 7 days   Anticipated Equipment Needs at Discharge   (Defer to next level of care)   Anticipated Discharge Disposition Transitional Care Facility   Risk & Benefits of therapy have been explained Yes   Patient, Family & other staff in agreement with plan of care Yes   Clinical Impression Comments Given activity level at baseline, may be appropriate for ARU   [facility] AM-PAC TM \"6 Clicks\"   2016, Trustees of [facility], under license to [employer].  All rights reserved.   6 Clicks Short Forms Basic Mobility Inpatient Short Form   [facility] AM-PAC  \"6 Clicks\" V.2 Basic Mobility Inpatient Short Form   1. Turning from your back to your side while in a flat bed without using bedrails? 1 - Total   2. " Moving from lying on your back to sitting on the side of a flat bed without using bedrails? 1 - Total   3. Moving to and from a bed to a chair (including a wheelchair)? 1 - Total   4. Standing up from a chair using your arms (e.g., wheelchair, or bedside chair)? 1 - Total   5. To walk in hospital room? 1 - Total   6. Climbing 3-5 steps with a railing? 1 - Total   Basic Mobility Raw Score (Score out of 24.Lower scores equate to lower levels of function) 6   Total Evaluation Time   Total Evaluation Time (Minutes) 13

## 2019-04-20 NOTE — PLAN OF CARE
[facility]   Intensive Care Unit   Nursing Note    Vital signs stable during the day.  PERRL.  Moves all extremities.  Follows commands at times. Pt on aerosol mask 40%.   Labs noted. Family has been at the bedside all day and have been updated.  Continue to monitor closely.      ROUTINE IP LABS (Last four results)  BMP  Recent Labs   Lab 04/19/19  0408 04/18/19  2150 04/18/19  1253 04/17/19  0805     --  136 129*   POTASSIUM 4.5 4.4 3.7 3.9   CHLORIDE 108  --  105 96   LULU 8.1*  --  8.8 9.7   CO2 20  --  20 22   BUN 36*  --  24 25   CR 0.93  --  0.67 0.84   *  --  139* 136*     CBC  Recent Labs   Lab 04/19/19  0408 04/18/19  1253 04/17/19  0805   WBC 13.6* 10.6 14.7*   RBC 3.21* 3.52* 3.65*   HGB 9.8* 10.9* 11.4*   HCT 27.7* 30.5* 31.6*   MCV 86 87 87   MCH 30.5 31.0 31.2   MCHC 35.4 35.7 36.1   RDW 14.7 14.5 14.1   * 137* 199       Kash Brown RN

## 2019-04-20 NOTE — PROGRESS NOTES
04/20/19 1119   General Information   Onset Date 04/17/19   Start of Care Date 04/20/19   Referring Physician Chao Sparrow MD   Patient Profile Review/OT: Additional Occupational Profile Info See Profile for full history and prior level of function   Patient/Family Goals Statement None stated    Swallowing Evaluation Bedside swallow evaluation   Behaviorial Observations Lethargic   Mode of current nutrition Oral diet   Type of oral diet Regular;Thin liquid   Respiratory Status O2 Supply   Type of O2 supply Nasal cannula   Comments 89F who presented 4/17 with expressive aphasia and encephalopathy, found to have MSSA bacteremia due to infection of a septic hip, now s/p washout by orthopedics on 4/18.  Found to have evidence of embolic strokes on MRI head. Pt and family deny that pt has any hx of dysphagia. Pt notably burping frequently t/o evaluation, pt's family states she does that all the time at baseline    Clinical Swallow Evaluation   Oral Musculature anomalies present;unable to assess due to poor participation/comprehension   Structural Abnormalities none present   Dentition present and adequate   Mucosal Quality adequate   Oral Labial Strength and Mobility impaired retraction;impaired pursing;impaired seal;impaired coordination   Lingual Strength and Mobility impaired protrusion;impaired left lateral movement;impaired coordination   Laryngeal Function Cough;Swallow;Voicing initiated   Oral Musculature Comments Limited secondary to pt's lethargy and difficulty with some commands    Additional Documentation Yes   Swallow Eval   Feeding Assistance dependent   Clinical Swallow Eval: Thin Liquid Texture Trial   Mode of Presentation, Thin Liquids spoon;fed by clinician   Volume of Liquid or Food Presented ice chips x 1, teaspoon x 1   Oral Phase of Swallow Poor AP movement;Premature pharyngeal entry   Pharyngeal Phase of Swallow impaired;no awareness of problems;reduction in laryngeal movement;throat clearing    Diagnostic Statement Anterior labial spillage, suspect premature spillage from oral cavity. Delayed swallow initiation, reduced hyolarygneal excursion, audible gulping suggested of reduced control.  high aspiration risk    Clinical Swallow Eval: Nectar Thick Liquid Texture Trial   Mode of Presentation, Nectar spoon;fed by clinician   Volume of Nectar Presented 2 teaspoons    Oral Phase, Nectar Poor AP movement;Premature pharyngeal entry   Pharyngeal Phase, Nectar impaired;no awareness of problems;reduction in laryngeal movement;repeated swallows;throat clearing   Diagnostic Statement Audible gulping and possible wet vocal quality x 1, high aspiration risk    Clinical Swallow Eval: Honey Thick Liquid Texture Trial   Mode of Presentation, Honey spoon;fed by clinician   Volume of Honey Presented 1 oz    Oral Phase, Honey Poor AP movement;Premature pharyngeal entry   Pharyngeal Phase, Honey intact  (by spoon )   Diagnostic Statement Pt tolerated teaspoons of honey thick liquids, no overt s/sx of aspiration, changes in breath sounds or vocal quality. Apparent improvement in oral and pharyngeal control vs other liquids    Clinical Swallow Eval: Puree Solid Texture Trial   Mode of Presentation, Puree spoon;fed by clinician   Volume of Puree Presented 3.5 oz   Oral Phase, Puree Poor AP movement   Pharyngeal Phase, Puree intact   Diagnostic Statement Pt tolerated puree without overt s/sx of aspiration, changes in breath sounds or vocal quality. Mostly single swallows per bolus    Swallow Compensations   Swallow Compensations Alternate viscosity of consistencies;Effortful swallow;Pacing;Multiple swallow;Reduce amounts   Results No difficulties noted   Esophageal Phase of Swallow   Patient reports or presents with symptoms of esophageal dysphagia Yes   Esophageal comments Significant bleching t/o evaluation. Pt's family member present states this is baseline    General Therapy Interventions   Planned Therapy Interventions  Dysphagia Treatment   Dysphagia treatment Oropharyngeal exercise training;Modified diet education;Instruction of safe swallow strategies   Swallow Eval: Clinical Impressions   Skilled Criteria for Therapy Intervention Skilled criteria met.  Treatment indicated.   Functional Assessment Scale (FAS) 3   Treatment Diagnosis Moderate oropharyngeal dysphagia, esophageal component suspected    Diet texture recommendations Full liquid;Honey thick liquids  (by spoon )   Recommended Feeding/Eating Techniques alternate between small bites and sips of food/liquid;check mouth frequently for oral residue/pocketing;hard swallow w/ each bite or sip;maintain upright posture during/after eating for 30 mins;no straws;small sips/bites   Demonstrates Need for Referral to Another Service dietitian;physical therapy;occupational therapy   Therapy Frequency 5 times/wk   Predicted Duration of Therapy Intervention (days/wks) 1 week    Anticipated Discharge Disposition extended care facility   Risks and Benefits of Treatment have been explained. Yes   Patient, family and/or staff in agreement with Plan of Care Yes   Clinical Impression Comments Pt seen for swallow evaluation. She is s/p stroke and extubation. Pt is lethargic at time of evaluation but does answer questions and participate despite keeping her eyes close t/o. Pt has family present. Pt and family deny a hx of dysphagia, however pt frequently burps during evaluation indicative of esophageal dysfunction and pt's family states she does this often. Oral motor exam slightly limited by pt's lethargy and difficulty with come commands. Appears to have left sided facial weakness, adequate dentition. Pt assessed with ice chips, thin, nectar and honey thick liquids as well as puree solids. Anterior labial loss with thin liquids by spoon and suspect premature spillage to the pharynx across textures. Improved coordination and timing of swallow function with honey thick liquids by spoon.  Delayed swallow initiation and reduced hyolaryngeal excursion is also noted. Pt tolerated 1 oz of honey thick liquids by spoon and 3.5 oz of puree by spoon without overt s/sx of aspiration, changes in breath sounds or vocal quality. Audible gulping and apparent reduced pharyngeal control with thin and nectar thick liquids increasing aspiration risk. Frequent burping also increasing risk for aspiration. Recommend a full liquid diet with honey thick liquids by spoon. ONLY when pt is appropriately alert and seated fully upright, she must stay seated upright for 30-60 minutes after oral intake as a reflux precaution given esophageal dysfunction is likely. Pt to consume small bites/sips and eat at a slow rate with 1:1 supervision and assistance from nursing staff.    Total Evaluation Time   Total Evaluation Time (Minutes) 20

## 2019-04-20 NOTE — PROGRESS NOTES
"[facility], [facility]    Vascular Neurology Progress Note    Name: Belgica Klein  YOB: 1928  MRN: [id number]  Admission Date: 4/17/2019  Date of Service:04/20/2019   Hospital Day: 4                                             24 hour event summary:                                         No acute events overnight  Extubated yesterday     Physical Examination:     /74   Pulse 96   Temp 98.7  F (37.1  C) (Oral)   Resp 15   Ht 1.727 m (5' 8\")   Wt 77.2 kg (170 lb 3.1 oz)   SpO2 99%   BMI 25.88 kg/m      Neurologic  Mental Status: lethargic, Mild dysarthria, falls asleep during examination   Cranial Nerves: PERRL, facial movements symmetric to grimace, hearing not formally tested but intact to conversation, tongue protrusion midline, Has a mild right gaze preference with suspected visual neglect/left homonymous hemianopia  Motor: left upper extremity decreased spontaneous movement and strength 1/5, RUE hand  2-3/5 with some antigravity strength  Reflexes: no clonus  Sensory: withdraws all extremities to pain, less on the LLE   Coordination: Unable to assess due to agitation  Station/Gait: unable to test    Medications:     Scheduled Meds:    acetaminophen  975 mg Oral Q8H     aspirin  325 mg Oral Daily    Or     aspirin  300 mg Rectal Daily     atorvastatin  10 mg Oral or NG Tube Daily at 8 pm     IV Antibiotic builder   Intravenous Q8H     furosemide  20 mg Intravenous Q12H     gadobutrol  10 mL Intravenous Once     insulin aspart  1-7 Units Subcutaneous TID AC     insulin aspart  1-5 Units Subcutaneous At Bedtime     sodium chloride (PF)  3 mL Intracatheter Q8H     PRN Meds: acetaminophen, [START ON 4/21/2019] acetaminophen, albuterol, sore throat lozenge, benztropine, glucose **OR** dextrose **OR** glucagon, HYDROmorphone, labetalol, lidocaine 4%, lidocaine (buffered or not buffered), LORazepam, magnesium sulfate, magnesium sulfate, - " MEDICATION INSTRUCTIONS -, melatonin, naloxone, OLANZapine zydis, ondansetron **OR** ondansetron, potassium chloride, potassium chloride with lidocaine, potassium chloride, potassium chloride, potassium chloride, senna-docusate **OR** senna-docusate, sodium chloride (PF), sodium phosphate, sodium phosphate, sodium phosphate, traMADol    Data:     Recent Labs   Lab Test 04/20/19  0433 04/19/19  0408 04/18/19  1253 04/17/19  0805   WBC 11.1* 13.6* 10.6 14.7*   RBC 3.16* 3.21* 3.52* 3.65*   HGB 9.7* 9.8* 10.9* 11.4*   HCT 27.2* 27.7* 30.5* 31.6*   * 125* 137* 199     Recent Labs   Lab Test 04/20/19  0433 04/19/19  0408 04/18/19  2150 04/18/19  1253 04/17/19  0805    137  --  136 129*   POTASSIUM 4.4 4.5 4.4 3.7 3.9   CHLORIDE 108 108  --  105 96   CO2 22 20  --  20 22   BUN 38* 36*  --  24 25   CR 0.70 0.93  --  0.67 0.84   * 153*  --  139* 136*   LULU 8.3* 8.1*  --  8.8 9.7     Recent Labs   Lab Test 04/17/19  1818 04/17/19  0805   PROTTOTAL 6.4* 7.4   ALBUMIN 2.5* 3.0*   BILITOTAL 0.8 0.8   ALKPHOS 122 128   AST 26 23   ALT 29 30     Recent Labs   Lab Test 04/17/19  0805   INR 1.20*     Recent Labs   Lab Test 04/17/19 1818   CHOL 126   HDL 22*   LDL 81   TRIG 117     Recent Labs   Lab Test 04/17/19 1818   A1C 6.1*     CRP: 231  Blood cultures x 4: Gram positive cocci in clusters     Imaging/ workup:     CT head without contrast: 4/17/2019   A 2-3 cm area of hypoattenuation and gray-white differentiation loss involving the mid right postcentral gyrus extending into the right parietal white matter. This is suspicious for area of acute/subacute ischemia.    CTA head and neck with contrast: 4/17/2019   CTA HEAD:  1. Patent intracranial circulation.  2. 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   CTA NECK:  Severe atherosclerotic plaquing at the bilateral carotid  bifurcations with approximately 20-30% narrowing of the proximal right internal carotid artery and approximately 40-50% narrowing of the proximal left internal carotid artery.    MRI brain +/- contrast: 4/18/2019   1. Numerous scattered infarcts involving the cerebral and cerebellar hemispheres bilaterally and basal ganglia bilaterally consistent with embolic infarcts from a central embolic source.  2. Diffuse cerebral volume loss and cerebral white matter changes consistent with chronic small vessel ischemic disease    TTE: 04/17/2019   1. The left ventricle is normal in structure, function and size. The visual ejection fraction is estimated at 55%.  2. The right ventricle is normal in structure, function and size.  3. There is mild to moderate mitral stenosis. Mean 8mmHg.  4. There is mild (1+) tricuspid regurgitation. The right ventricular systolic pressure is approximated at 42mmHg plus the right atrial pressure.  5. Dilation of the inferior vena cava is present with abnormal respiratory variation in diameter.  6. There is no atrial shunt seen. A contrast injection (Bubble Study) was performed that was negative for flow across the interatrial septum.    Assessment and Plan:                                           Belgica Klein is a 89 year old female from [facility] with a history of hypertension, osteoarthritis of R knee s/p kenalog injection 4/16/19, and L total hip arthroplasty, suspected TIA with aphasia in 12/2018, and reoccurring hyponatremia, who presented with her daughter for confusion, inability to follow commands, and nonsensical communication. CT on 4/17/19 consistent with Right postcentral gyrus infarct. MRI delayed due to agitation. Pt also found to have fever to 101.3, leukocytosis to 14.7 and blood cultures positive for MSSA - LUTHER with bubble study negative. EKG notable for abnormal rhythm. 4/18/19 pt found to have septic left hip joint with concepcion pus and taken to OR for washout. MRI  completed in the interim and showed numerous acute/subacute infarcts - suspect embolic source from either new onset atrial fibrillation vs septic embolic from vegetations/ MSSA bacteremia    #Numerous acute/subacute infarcts - suspect septic emboli given septic Left hip vs new onset atrial fibrillation   - Neurochecks every 4 hours  - Continue Aspirin 325 mg  - Statin: Lipitor 40 mg daily  - Repeat CTH today shows stable changes   - Will need to evaluate for vegetations with either CT or cardiac MRI   - Would recommend to hold off on anticoagulation until vegetations are ruled out due to higher risk of hemorrhagic conversion  - Telemetry monitoring  - PT/OT/SLP  - PM&R  - Stroke Education    #New onset atrial fibrillation  - Rate control for now    Patient Follow-up    - Final recommendation pending work-up    Plan discussed with family at bedside, We will follow along closely, please call us with any questions or concerns, case seen and discussed with Dr Lupe Hess MD  Vascular Neurology fellow  Pager # [phone]

## 2019-04-20 NOTE — PROGRESS NOTES
"Orthopedic Surgery  Belgica Klein  April 20, 2019  Admit date: 4/17/2019   POD #2  S/P I and D L hip      Patient resting comfortably in bed.  Pain controlled.  Patient was extubated.  No events over night.    /74   Pulse 96   Temp 97.8  F (36.6  C) (Oral)   Resp 19   Ht 1.727 m (5' 8\")   Wt 77.2 kg (170 lb 3.1 oz)   SpO2 95%   BMI 25.88 kg/m       Left hip dressing is CDI.  No erythema appreciated.  L calf is Non tender.  Left lower extremity is NVI.    Labs  Hemoglobin   Date Value Ref Range Status   04/20/2019 9.7 (L) 11.7 - 15.7 g/dL Final     INR   Date Value Ref Range Status   04/17/2019 1.20 (H) 0.86 - 1.14 Final     Platelet Count   Date Value Ref Range Status   04/20/2019 102 (L) 150 - 450 10e9/L Final     A/P  1. Plan  DVT Prophylaxis:ASA  WB Status: WBAT, mobilize w/ PT/OT when able  Continue pain regiment.  Keep dressing clean, dry, and intact. Keep Aquacel in place.  Keep drain until less than 10cc's output per shift.  Continue Abx per ID.      Adwoa Lan PA-C    "

## 2019-04-20 NOTE — PLAN OF CARE
Discharge Planner PT     PT: Orders received, eval completed, tx initiated.  Pt lives in [place] and [place] (commutes IND between) alone. At baseline ambulates IND, started using SEC due to hip pain recently before I&D due to pain.    Patient plan for discharge: Unknown, acknowledges rehab need  Current status: Pt keeps eyes closed throughout, but responds mostly appropriately to yes/no questions. Family clarifies.Trace L UE movement. Inconsistent R UE/LE strength, appears <3/5. L shoulder is very painful to touch/movement, family states this is baseline and was likely going to have a replacement. Unable to maintain LE positioning when placed. Total-A to sit up in bed (not dangled), very poor trunk control and painful L shoulder. Plan to try combilizer chair next tx. On o2 max 10L, down into high 80s with activity.  Barriers to return to prior living situation: Total-A for all mobility, medical status  Recommendations for discharge: TCU  Rationale for recommendations: Will need ongoing skilled PT intervention to improve independence and safety with functional mobility skills prior to returning home. Pt may be appropriate for ARU pending progress. Unsure if pt will be able to return to living alone independently.       Entered by: John Guo 04/20/2019 5:30 PM

## 2019-04-20 NOTE — PLAN OF CARE
Discharge Planner SLP   Patient plan for discharge: Did not discuss  Current status: Pt seen for swallow evaluation. She is s/p stroke and extubation. Pt is lethargic at time of evaluation but does answer questions and participate despite keeping her eyes close t/o. Pt has family present. Pt and family deny a hx of dysphagia, however pt frequently burps during evaluation indicative of esophageal dysfunction and pt's family states she does this often. Oral motor exam slightly limited by pt's lethargy and difficulty with come commands. Appears to have left sided facial weakness, adequate dentition. Pt assessed with ice chips, thin, nectar and honey thick liquids as well as puree solids. Anterior labial loss with thin liquids by spoon and suspect premature spillage to the pharynx across textures. Improved coordination and timing of swallow function with honey thick liquids by spoon. Delayed swallow initiation and reduced hyolaryngeal excursion is also noted. Pt tolerated 1 oz of honey thick liquids by spoon and 3.5 oz of puree by spoon without overt s/sx of aspiration, changes in breath sounds or vocal quality. Audible gulping and apparent reduced pharyngeal control with thin and nectar thick liquids increasing aspiration risk. Frequent burping also increasing risk for aspiration.     Recommend a full liquid diet with honey thick liquids by spoon. ONLY when pt is appropriately alert and seated fully upright, she must stay seated upright for 30-60 minutes after oral intake as a reflux precaution given esophageal dysfunction is likely. Pt to consume small bites/sips and eat at a slow rate with 1:1 supervision and assistance from nursing staff - nursing to downgrade to NPO if concerned for aspiration     Barriers to return to prior living situation: Below baseline, aspiration risk, nutrition, medical status   Recommendations for discharge: TCU pending clinical course and OT/PT recommendations   Rationale for  recommendations: SLP at next level of care for management of dysphagia        Entered by: Nakia Beltran 04/20/2019 11:37 AM

## 2019-04-20 NOTE — PROGRESS NOTES
[facility]    Medicine Progress Note - Hospitalist Service        Date of Admission:  4/17/2019  7:56 AM    Assessment & Plan:   Belgica Klein is a 89-year-old female who presents to the Emergency Room with change in mental state and expressive aphasia.      Staph aureus bacteremia  Septic left total hip arthroplasty status post I&D  Septic shock  -Patient presenting with fever up to 101 at home and worsening left hip pain, concerns for septic left hip  -Blood culture growing staph aureus  -Due to ongoing left hip pain had aspiration of the left hip joint which was concerning for septic joint  -Orthopedic surgery consulted, patient underwent I&D on 4/18  -Blood Culture and left hip culture growing staph aureus, blood culture from 4/19 and 4/20 sterile thus far  -Continues on Ancef, infectious disease following.    Numerous acute/subacute infarcts with suspected septic emboli Acute encephalopathy   -The patient presents with expressive aphasia, some confusion and possibly left visual field cut.    -CT head was read as 2-3 cm area of hypoattenuation and gray-white differentiation loss involving the mid right postcentral gyrus extending into the right parietal white matter.   -MRI of the brain done on 4/18 reveals numerous scattered infarcts involving the cerebral and cerebellar hemispheres bilaterally and basal ganglia bilaterally consistent with embolic infarcts from a central source   -Given the septic presentation concerns for septic emboli  -Neurology following  -Continue aspirin alone for now  -Physical therapy, occupational therapy and speech therapy as able.  -Echocardiogram reviewed, LV normal in structure function and size.  -LUTHER was offered, family declined  -We will discuss with cardiology if cMRI would be useful to evaluate vegetations  -CT head today appears to be stable, patient hemiparetic on the left side.    New onset A. fib with RVR:  -Continues on amiodarone drip  -Hold anticoagulation  for now until septic emboli ruled out  -Consult cardiology in the morning     Postop hypoxia:  -Was intubated postop, successfully extubated yesterday(4/19)  -Probably mildly volume overloaded due to all the aggressive resuscitation  -Cautious gentle diuretics with Lasix 20 mg IV twice daily  -Incentive spirometry as able  -Currently on 4 L oxygen, wean as able.    Hyponatremia.    -Sodium at presentation was 129.  The patient reportedly has had problems with low sodium in the recent past.  She was on hydrochlorothiazide and reportedly was stopped just prior to admission.  -Continue to hold hydrochlorothiazide  -Improved     Benign essential hypertension.    -prior to admission on amlodipine and losartan   -Sharron-procedure was in shock-most likely a combination of septic and cardiogenic rapid A. Fib  -Blood pressure now stable  -We will consider restarting prior to admission antihypertensives tomorrow morning.     Hyperlipidemia.    -Continue Lipitor 40 mg daily when able to take orally.     Dysphagia:  -SLP following, recommend a full liquid diet with honey thick liquids by spoon        Diet: Combination Diet Full Liquid Diet; Honey Thickened Liquids (pre-thickened or use instant food thickener) (By spoon)     DVT Prophylaxis: Pneumatic Compression Devices   Aguilar Catheter: in place, indication: Strict 1-2 Hour I&O  Code Status: DNR/DNI     Disposition Plan    Expected discharge: 2 - 3 days, recommended to transitional care unit Entered: Chevy Li MD 04/20/2019, 1:48 PM        The patient's care was discussed with the Bedside Nurse, Patient and Critical care Consultant.    Chevy Li MD  Hospitalist Service  [facility]    ______________________________________________________________________    Interval History   Since successfully extubated yesterday, now on oxygen at 4 L.  Afebrile.  On amiodarone drip for rapid A. fib.  Afebrile.    Data reviewed today: I reviewed all medications,  "new labs and imaging results over the last 24 hours. I personally reviewed no images or EKG's today.    Physical Exam   Vital signs:  Temp: 98.7  F (37.1  C) Temp src: Oral BP: 144/74   Heart Rate: 87 Resp: 15 SpO2: 99 % O2 Device: Nasal cannula Oxygen Delivery: 4 LPM Height: 172.7 cm (5' 8\") Weight: 77.2 kg (170 lb 3.1 oz)  Estimated body mass index is 25.88 kg/m  as calculated from the following:    Height as of this encounter: 1.727 m (5' 8\").    Weight as of this encounter: 77.2 kg (170 lb 3.1 oz).      Wt Readings from Last 2 Encounters:   04/20/19 77.2 kg (170 lb 3.1 oz)       Gen: Awake, oriented to self, follows some simple commands appropriately  HEENT: Supple neck, moist oral mucosa, no pallor  Resp: Few crackles bilaterally in all the lung fields, normal effort of breathing  CVS: Irregular, no murmur  Abd/GI: Soft, non-tender. BS- normoactive.   Skin: Warm, dry no rashes  MSK: Left thigh with surgical incision that is bandaged, drain in place.  Neuro-hemiparetic on the left side    Data   Recent Labs   Lab 04/20/19  0433 04/19/19  0408 04/18/19  2150 04/18/19  1253 04/17/19  1818 04/17/19  0805   WBC 11.1* 13.6*  --  10.6  --  14.7*   HGB 9.7* 9.8*  --  10.9*  --  11.4*   MCV 86 86  --  87  --  87   * 125*  --  137*  --  199   INR  --   --   --   --   --  1.20*    137  --  136  --  129*   POTASSIUM 4.4 4.5 4.4 3.7  --  3.9   CHLORIDE 108 108  --  105  --  96   CO2 22 20  --  20  --  22   BUN 38* 36*  --  24  --  25   CR 0.70 0.93  --  0.67  --  0.84   ANIONGAP 7 9  --  11  --  11   LULU 8.3* 8.1*  --  8.8  --  9.7   * 153*  --  139*  --  136*   ALBUMIN  --   --   --   --  2.5* 3.0*   PROTTOTAL  --   --   --   --  6.4* 7.4   BILITOTAL  --   --   --   --  0.8 0.8   ALKPHOS  --   --   --   --  122 128   ALT  --   --   --   --  29 30   AST  --   --   --   --  26 23   LIPASE  --   --   --   --   --  77       Recent Results (from the past 24 hour(s))   CT Head w/o Contrast    Narrative    CT " OF THE HEAD WITHOUT CONTRAST April 20, 2019 12:56 PM     HISTORY: Stroke.     TECHNIQUE: Axial CT images of the head from the skull base to the  vertex were acquired without IV contrast. Radiation dose for this scan  was reduced using automated exposure control, adjustment of the mA  and/or kV according to patient size, or iterative reconstruction  technique.    COMPARISON: Head MRI 4/18/2019.    FINDINGS:   INTRACRANIAL CONTENTS: No intracranial hemorrhage or abnormal fluid  collection. Evolving acute infarcts scattered throughout the supra and  infratentorial parenchyma. They are best visualized in the right  frontoparietal cortex, medial left occipital lobe, left caudate, right  thalamus and right cerebellum. No gross mass effect associated with  these infarcts. No CT evidence of a new abnormality compared to the  prior MRI. Superimposed mild presumed chronic small vessel ischemic  change and mild generalized volume loss.    VISUALIZED ORBITS/SINUSES/MASTOIDS: No significant orbital  abnormality. No significant paranasal sinus mucosal disease. No  significant middle ear or mastoid effusion.    OSSEOUS STRUCTURES/SOFT TISSUES: No significant abnormality.      Impression    IMPRESSION:  1.  No change compared to the prior head MRI.  2.  Overall moderate burden of acute infarcts in the supra and  infratentorial parenchyma. The largest infarcts are in the right  frontoparietal parenchyma in the medial left occipital lobe.  3.  No gross mass effect. No hemorrhagic transformation.    JOZEF GOLDSTEIN MD     Medications     amiodarone 0.5 mg/min (04/20/19 0732)     - MEDICATION INSTRUCTIONS -       sodium chloride 30 mL/hr at 04/20/19 1311       acetaminophen  975 mg Oral Q8H     aspirin  325 mg Oral Daily    Or     aspirin  300 mg Rectal Daily     atorvastatin  10 mg Oral or NG Tube Daily at 8 pm     IV Antibiotic builder   Intravenous Q8H     furosemide  20 mg Intravenous Q12H     gadobutrol  10 mL Intravenous Once      insulin aspart  1-7 Units Subcutaneous TID AC     insulin aspart  1-5 Units Subcutaneous At Bedtime     sodium chloride (PF)  3 mL Intracatheter Q8H

## 2019-04-20 NOTE — PROGRESS NOTES
" INFECTIOUS DISEASE Progress Note  April 20, 2019  [id number]  Belgica Klein    ANTIBIOTICS:  Cefazolin 2 g iv q 8 hours    SUBJECTIVE: afebrile, notes reviewed, off pressors, on amiodarone drip. Out of A.fib and in sinus, says she had hives with pcns 5 years ago-was fairly significant, s/p L hip washout    OBJECTIVE:  /86   Pulse 90   Temp 98.7  F (37.1  C) (Oral)   Resp 27   Ht 1.727 m (5' 8\")   Wt 77.2 kg (170 lb 3.1 oz)   SpO2 97%   BMI 25.88 kg/m    somnolent but awakens and conversant  Conj w/o petechiae  L 4th finger--Janeway lesions  R 4th finger Janeway lesions and R palmar Janeway  Probable small Janeway lesions both plantar feet-less clear  Cor 2/6 murmur  Lung wheezy  abd soft, NT, ND  L hip hemovac, incision covered  No rash    LAB Data:  CREAT 0.7  WBC 11    MICROBIOLOGY:  BC 4/17 MSSA  BC 4/19 NG  BC 4/20 NG  L hip 4/18 S.aureus    IMAGING:  -CT head was read as 2-3 cm area of hypoattenuation and gray-white differentiation loss involving the mid right postcentral gyrus extending into the right parietal white matter.   -MRI of the brain done on 4/18 reveals numerous scattered infarcts involving the cerebral and cerebellar hemispheres bilaterally and basal ganglia bilaterally consistent with embolic infarcts from a central source       Attestation:  I have reviewed today's vital signs, notes, medications, labs and imaging.    ASSESSMENT:  1. 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 INFECTED L  GURJIT  3. CVA, SEPTIC CEREBRAL EMBOLI-Would be best to have her on nafcillin for CNS penetration but pcn allergy  4. ENDOCARDITIS-fairly certain given Janeway lesions and septic cerebral emboli, would not be a surgical candidate so LUTHER not that necessary as not likely to   5. PCN ALLERGY-HIVES 5 yrs ago, may still tolerate nafcillin and may be best to " switch  6. PROGNOSIS-probably poor    REC  1. Cont cefazolin, consider change to nafcillin 2 g iv q 4 hours pending d/w family  2. Serial BCxs  3. Follow neuro, cardiac exam  4. Would avoid anticoagulation given CNS emboli and endocarditis as high risk CNS bleed    SHWETA LIMA M.D.  O:[phone]   B:[phone]

## 2019-04-20 NOTE — PROGRESS NOTES
SPIRITUAL HEALTH SERVICES Progress Note  [facility] ICU    Follow up visit.   talked with pt's daughters who expressed gratefulness that their mother was doing better today.  They said they would like her to have communion later in the week, and they do not have any other SH needs at this time.      SH available as needs arise, and upon request.  SH will follow.      Kristin Hollingsworth  Chaplain Resident

## 2019-04-20 NOTE — PROVIDER NOTIFICATION
MD updated UOP decreasing since 0000, 50mL UOP over the past 2hrs. Aguilar patent, flushed. Bladder scan indicates 4mL. New orders: 1x dose Albumin 5% 12.5g.

## 2019-04-21 LAB
ANION GAP SERPL CALCULATED.3IONS-SCNC: 9 MMOL/L (ref 3–14)
BUN SERPL-MCNC: 35 MG/DL (ref 7–30)
CALCIUM SERPL-MCNC: 8.3 MG/DL (ref 8.5–10.1)
CHLORIDE SERPL-SCNC: 105 MMOL/L (ref 94–109)
CO2 SERPL-SCNC: 25 MMOL/L (ref 20–32)
CREAT SERPL-MCNC: 0.72 MG/DL (ref 0.52–1.04)
ERYTHROCYTE [DISTWIDTH] IN BLOOD BY AUTOMATED COUNT: 14.7 % (ref 10–15)
GFR SERPL CREATININE-BSD FRML MDRD: 73 ML/MIN/{1.73_M2}
GLUCOSE BLDC GLUCOMTR-MCNC: 127 MG/DL (ref 70–99)
GLUCOSE SERPL-MCNC: 149 MG/DL (ref 70–99)
HCT VFR BLD AUTO: 28.1 % (ref 35–47)
HGB BLD-MCNC: 10.1 G/DL (ref 11.7–15.7)
MAGNESIUM SERPL-MCNC: 2 MG/DL (ref 1.6–2.3)
MCH RBC QN AUTO: 30.4 PG (ref 26.5–33)
MCHC RBC AUTO-ENTMCNC: 35.9 G/DL (ref 31.5–36.5)
MCV RBC AUTO: 85 FL (ref 78–100)
PHOSPHATE SERPL-MCNC: 3.2 MG/DL (ref 2.5–4.5)
PLATELET # BLD AUTO: 108 10E9/L (ref 150–450)
POTASSIUM SERPL-SCNC: 3.5 MMOL/L (ref 3.4–5.3)
RBC # BLD AUTO: 3.32 10E12/L (ref 3.8–5.2)
SODIUM SERPL-SCNC: 139 MMOL/L (ref 133–144)
WBC # BLD AUTO: 17.9 10E9/L (ref 4–11)

## 2019-04-21 PROCEDURE — 25000128 H RX IP 250 OP 636: Performed by: INTERNAL MEDICINE

## 2019-04-21 PROCEDURE — 83735 ASSAY OF MAGNESIUM: CPT | Performed by: INTERNAL MEDICINE

## 2019-04-21 PROCEDURE — 85027 COMPLETE CBC AUTOMATED: CPT | Performed by: INTERNAL MEDICINE

## 2019-04-21 PROCEDURE — 25800030 ZZH RX IP 258 OP 636: Performed by: INTERNAL MEDICINE

## 2019-04-21 PROCEDURE — 84100 ASSAY OF PHOSPHORUS: CPT | Performed by: INTERNAL MEDICINE

## 2019-04-21 PROCEDURE — 87077 CULTURE AEROBIC IDENTIFY: CPT | Performed by: INTERNAL MEDICINE

## 2019-04-21 PROCEDURE — 99233 SBSQ HOSP IP/OBS HIGH 50: CPT | Performed by: INTERNAL MEDICINE

## 2019-04-21 PROCEDURE — 20000003 ZZH R&B ICU

## 2019-04-21 PROCEDURE — 25000132 ZZH RX MED GY IP 250 OP 250 PS 637: Performed by: INTERNAL MEDICINE

## 2019-04-21 PROCEDURE — 87040 BLOOD CULTURE FOR BACTERIA: CPT | Performed by: INTERNAL MEDICINE

## 2019-04-21 PROCEDURE — 99233 SBSQ HOSP IP/OBS HIGH 50: CPT | Mod: GC | Performed by: PSYCHIATRY & NEUROLOGY

## 2019-04-21 PROCEDURE — A9270 NON-COVERED ITEM OR SERVICE: HCPCS | Performed by: INTERNAL MEDICINE

## 2019-04-21 PROCEDURE — 00000146 ZZHCL STATISTIC GLUCOSE BY METER IP

## 2019-04-21 PROCEDURE — 80048 BASIC METABOLIC PNL TOTAL CA: CPT | Performed by: INTERNAL MEDICINE

## 2019-04-21 PROCEDURE — 25000125 ZZHC RX 250: Performed by: INTERNAL MEDICINE

## 2019-04-21 RX ORDER — DIPHENHYDRAMINE HYDROCHLORIDE 50 MG/ML
25 INJECTION INTRAMUSCULAR; INTRAVENOUS ONCE
Status: COMPLETED | OUTPATIENT
Start: 2019-04-21 | End: 2019-04-21

## 2019-04-21 RX ORDER — NAFCILLIN SODIUM 2 G/8ML
2 INJECTION, POWDER, FOR SOLUTION INTRAMUSCULAR; INTRAVENOUS EVERY 4 HOURS
Status: DISCONTINUED | OUTPATIENT
Start: 2019-04-21 | End: 2019-04-25

## 2019-04-21 RX ADMIN — ASPIRIN 300 MG: 300 SUPPOSITORY RECTAL at 09:06

## 2019-04-21 RX ADMIN — POTASSIUM CHLORIDE 20 MEQ: 400 INJECTION, SOLUTION INTRAVENOUS at 05:10

## 2019-04-21 RX ADMIN — NAFCILLIN SODIUM 2 G: 2 INJECTION, POWDER, LYOPHILIZED, FOR SOLUTION INTRAMUSCULAR; INTRAVENOUS at 17:11

## 2019-04-21 RX ADMIN — CEFAZOLIN: 1 INJECTION, POWDER, FOR SOLUTION INTRAMUSCULAR; INTRAVENOUS at 06:36

## 2019-04-21 RX ADMIN — NAFCILLIN SODIUM 2 G: 2 INJECTION, POWDER, LYOPHILIZED, FOR SOLUTION INTRAMUSCULAR; INTRAVENOUS at 20:15

## 2019-04-21 RX ADMIN — CEFAZOLIN: 1 INJECTION, POWDER, FOR SOLUTION INTRAMUSCULAR; INTRAVENOUS at 14:49

## 2019-04-21 RX ADMIN — AMIODARONE HYDROCHLORIDE 0.5 MG/MIN: 50 INJECTION, SOLUTION INTRAVENOUS at 17:00

## 2019-04-21 RX ADMIN — HYDROMORPHONE HYDROCHLORIDE 0.5 MG: 1 INJECTION, SOLUTION INTRAMUSCULAR; INTRAVENOUS; SUBCUTANEOUS at 07:52

## 2019-04-21 RX ADMIN — AMIODARONE HYDROCHLORIDE 0.5 MG/MIN: 50 INJECTION, SOLUTION INTRAVENOUS at 00:20

## 2019-04-21 RX ADMIN — DIPHENHYDRAMINE HYDROCHLORIDE 25 MG: 50 INJECTION, SOLUTION INTRAMUSCULAR; INTRAVENOUS at 16:47

## 2019-04-21 RX ADMIN — AMIODARONE HYDROCHLORIDE 0.5 MG/MIN: 50 INJECTION, SOLUTION INTRAVENOUS at 09:03

## 2019-04-21 RX ADMIN — HYDROMORPHONE HYDROCHLORIDE 0.5 MG: 1 INJECTION, SOLUTION INTRAMUSCULAR; INTRAVENOUS; SUBCUTANEOUS at 20:09

## 2019-04-21 RX ADMIN — MAGNESIUM SULFATE HEPTAHYDRATE 2 G: 40 INJECTION, SOLUTION INTRAVENOUS at 05:15

## 2019-04-21 ASSESSMENT — ACTIVITIES OF DAILY LIVING (ADL)
ADLS_ACUITY_SCORE: 19
ADLS_ACUITY_SCORE: 14
ADLS_ACUITY_SCORE: 19
ADLS_ACUITY_SCORE: 19

## 2019-04-21 ASSESSMENT — MIFFLIN-ST. JEOR: SCORE: 1239.5

## 2019-04-21 NOTE — PROGRESS NOTES
" INFECTIOUS DISEASE Progress Note  April 21, 2019  [id number]  Belgica Klein    ANTIBIOTICS:  Cefazolin 2 g iv q 8 hours    SUBJECTIVE: afebrile, notes reviewed, no pressors,in sinus, palliative care to see tomorrow, overall doing better today-more alert and mentation better, says she does not feel well, no HA    --says she had hives with pcns 5 years ago-no throat or tongue swelling or SOB, family thinks was for UTI so suspect augmentin    OBJECTIVE:  /82   Pulse 90   Temp 98.8  F (37.1  C) (Axillary)   Resp 19   Ht 1.727 m (5' 8\")   Wt 77.1 kg (169 lb 15.6 oz)   SpO2 97%   BMI 25.84 kg/m    Alert,  Conversant, does not seem confused  Neck supple  Conj new bilateral petechiae  L 4th finger--Janeway lesions  R 4th finger Janeway lesions and R palmar Janeway  Probable small Janeway lesions both plantar feet-less clear  Cor 2/6 murmur  Lung coarse BS  abd soft, NT, ND  L hip hemovac, incision covered  No rash    TTE 4/17-  Interpretation Summary     1. The left ventricle is normal in structure, function and size. The visual  ejection fraction is estimated at 55%.  2. The right ventricle is normal in structure, function and size.  3. There is mild to moderate mitral stenosis. Mean 8mmHg.  4. There is mild (1+) tricuspid regurgitation. The right ventricular systolic  pressure is approximated at 42mmHg plus the right atrial pressure.  5. Dilation of the inferior vena cava is present with abnormal respiratory  variation in diameter.  6. There is no atrial shunt seen. A contrast injection (Bubble Study) was  performed that was negative for flow across the interatrial septum.    LAB Data:  CREAT 0.7  WBC 17    MICROBIOLOGY:  BC 4/17 MSSA  BC 4/19 NG  BC 4/20 NG  L hip 4/18 S.aureus    IMAGING:  -CT head was read as 2-3 cm area of hypoattenuation and gray-white differentiation loss involving the mid right postcentral gyrus extending into the right parietal white matter.   -MRI of the brain done on 4/18 reveals " numerous scattered infarcts involving the cerebral and cerebellar hemispheres bilaterally and basal ganglia bilaterally consistent with embolic infarcts from a central source       Attestation:  I have reviewed today's vital signs, notes, medications, labs and imaging.    ASSESSMENT:  1. 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 INFECTED L  GURJIT  3. CVA, SEPTIC CEREBRAL EMBOLI-Would be best to have her on nafcillin for CNS penetration but pcn allergy; cefazolin will not penetrate BBB  4. ENDOCARDITIS-fairly certain given Janeway lesions and septic cerebral emboli, would not be a surgical candidate so LUTHER not that necessary as not likely to ; today has new bilateral conjunctival petechiae also c/w left sided endocarditis, despite this she is doing better today neurologically and so far f/u BC are negative  5. PCN ALLERGY-HIVES 5 yrs ago, may still tolerate nafcillin and I think best to switch; think she probably got augmentin for UTI from d/w family, amoxicillin more allergenic c/w nafcillin  6. PROGNOSIS-probably poor, palliative care to see, that said she is improved today    REC  1. Change cefazolin to nafcillin 2 g iv q 4 hours--d/w family and pt(3 dtrs, 1 son all present)   -Premed with benadryl 25 mg iv  X 1; will defer further doses if tolerates as may negatively impact CNS status  2. Serial BCxs; BC x 1 today  3. Follow neuro, cardiac exam  4. Would avoid anticoagulation given CNS emboli and endocarditis as high risk CNS bleed  5. If aggressive Rx planned consider repeat TTE next week  6. Palliative care to see  D/w family in detail    SHWETA LIMA M.D.  O:[phone]   B:[phone]

## 2019-04-21 NOTE — PLAN OF CARE
OT: Attempted session and discussed with patient and patient's daughters. Patient and daughters requesting IP OT to hold therapy evaluation until after palliative care meeting (on 4/22 per RN). Will hold evaluation until plan of care has been established and until after palliative consult, per pt request.

## 2019-04-21 NOTE — PROGRESS NOTES
"Orthopedic Surgery  Belgica Klein  April 21, 2019 4/17/2019  7:56 AM  POD #3  S/P I and D L hip    Patient resting comfortably in bed.  Pain controlled.  Patient able to express pain as mild to moderate.  No events over night.    Alert to self.    /86 (BP Location: Right arm)   Pulse 90   Temp 98.8  F (37.1  C) (Axillary)   Resp 22   Ht 1.727 m (5' 8\")   Wt 77.1 kg (169 lb 15.6 oz)   SpO2 99%   BMI 25.84 kg/m       Left hip dressing is CDI.  No erythema appreciated.  L calf is Non tender.  Left lower extremity is NVI.  L drain intact with minimal output.    Labs  Lab Results   Component Value Date    HGB 10.1 04/21/2019     Lab Results   Component Value Date    WBC 17.9 04/21/2019      Lab Results   Component Value Date    ANEU 13.9 04/17/2019     Lab Results   Component Value Date     04/21/2019       A/P  1. Plan  DVT Prophylaxis: ASA  WB Status: WBAT, mobilize PT/OT when able  Continue pain regiment.  Keep dressing clean, dry, and intact. Keep aquacel in place.  Continue IV Abx per ID.  Drain: discontinue when less than 10cc's output per 8 hours.    Adwoa Lan PA-C    "

## 2019-04-21 NOTE — PLAN OF CARE
[facility]   Intensive Care Unit   Nursing Note    Vital signs stable during the day.  PERRL.  Pt is more alert today, but still drowsy.  Pt too sleepy to take PO intake safely.   Moves right extremities more than left, but very weak bilaterally.   Follows commands.  Tolerating NC and aerosol mask O2. Labs noted.  Continue to monitor closely.    ROUTINE IP LABS (Last four results)  BMP  Recent Labs   Lab 04/20/19  0433 04/19/19  0408 04/18/19  2150 04/18/19  1253 04/17/19  0805    137  --  136 129*   POTASSIUM 4.4 4.5 4.4 3.7 3.9   CHLORIDE 108 108  --  105 96   LULU 8.3* 8.1*  --  8.8 9.7   CO2 22 20  --  20 22   BUN 38* 36*  --  24 25   CR 0.70 0.93  --  0.67 0.84   * 153*  --  139* 136*     CBC  Recent Labs   Lab 04/20/19  0433 04/19/19  0408 04/18/19  1253 04/17/19  0805   WBC 11.1* 13.6* 10.6 14.7*   RBC 3.16* 3.21* 3.52* 3.65*   HGB 9.7* 9.8* 10.9* 11.4*   HCT 27.2* 27.7* 30.5* 31.6*   MCV 86 86 87 87   MCH 30.7 30.5 31.0 31.2   MCHC 35.7 35.4 35.7 36.1   RDW 15.0 14.7 14.5 14.1   * 125* 137* 199       Kash Brown RN

## 2019-04-21 NOTE — PROGRESS NOTES
"[facility], [facility]    Vascular Neurology Progress Note    Name: Belgica Klein  YOB: 1928  MRN: [id number]  Admission Date: 4/17/2019  Date of Service:04/21/2019   Hospital Day: 5                                             24 hour event summary:                                         No acute events overnight  More alert and interactive today    Physical Examination:     /83   Pulse 90   Temp 98.8  F (37.1  C) (Axillary)   Resp 20   Ht 1.727 m (5' 8\")   Wt 77.1 kg (169 lb 15.6 oz)   SpO2 96%   BMI 25.84 kg/m      Neurologic  Mental Status: awake, alert, more interactive today, pleasant   Cranial Nerves: PERRL, facial movements symmetric to grimace, hearing not formally tested but intact to conversation, tongue protrusion midline, Has a mild right gaze preference with suspected visual neglect/left homonymous hemianopia  Motor: left upper extremity decreased spontaneous movement and strength 1/5, RUE hand  2-3/5 with some antigravity strength  Reflexes: no clonus  Sensory: withdraws all extremities to pain, less on the LLE   Coordination: Unable to assess due to agitation  Station/Gait: unable to test    Medications:     Scheduled Meds:    acetaminophen  975 mg Oral Q8H     aspirin  325 mg Oral Daily    Or     aspirin  300 mg Rectal Daily     atorvastatin  10 mg Oral or NG Tube Daily at 8 pm     IV Antibiotic builder   Intravenous Q8H     gadobutrol  10 mL Intravenous Once     insulin aspart  1-7 Units Subcutaneous TID AC     insulin aspart  1-5 Units Subcutaneous At Bedtime     sodium chloride (PF)  3 mL Intracatheter Q8H     PRN Meds: acetaminophen, acetaminophen, albuterol, albuterol, sore throat lozenge, benztropine, glucose **OR** dextrose **OR** glucagon, HYDROmorphone, labetalol, lidocaine 4%, lidocaine (buffered or not buffered), LORazepam, magnesium sulfate, magnesium sulfate, - MEDICATION INSTRUCTIONS -, melatonin, naloxone, " OLANZapine zydis, ondansetron **OR** ondansetron, potassium chloride, potassium chloride with lidocaine, potassium chloride, potassium chloride, potassium chloride, senna-docusate **OR** senna-docusate, sodium chloride (PF), sodium phosphate, sodium phosphate, sodium phosphate, traMADol    Data:     Recent Labs   Lab Test 04/21/19  0410 04/20/19  0433 04/19/19  0408 04/18/19  1253 04/17/19  0805   WBC 17.9* 11.1* 13.6* 10.6 14.7*   RBC 3.32* 3.16* 3.21* 3.52* 3.65*   HGB 10.1* 9.7* 9.8* 10.9* 11.4*   HCT 28.1* 27.2* 27.7* 30.5* 31.6*   * 102* 125* 137* 199     Recent Labs   Lab Test 04/21/19  0410 04/20/19  0433 04/19/19  0408 04/18/19  2150 04/18/19  1253 04/17/19  0805    137 137  --  136 129*   POTASSIUM 3.5 4.4 4.5 4.4 3.7 3.9   CHLORIDE 105 108 108  --  105 96   CO2 25 22 20  --  20 22   BUN 35* 38* 36*  --  24 25   CR 0.72 0.70 0.93  --  0.67 0.84   * 145* 153*  --  139* 136*   LULU 8.3* 8.3* 8.1*  --  8.8 9.7     Recent Labs   Lab Test 04/17/19 1818 04/17/19  0805   PROTTOTAL 6.4* 7.4   ALBUMIN 2.5* 3.0*   BILITOTAL 0.8 0.8   ALKPHOS 122 128   AST 26 23   ALT 29 30     Recent Labs   Lab Test 04/17/19  0805   INR 1.20*     Recent Labs   Lab Test 04/17/19 1818   CHOL 126   HDL 22*   LDL 81   TRIG 117     Recent Labs   Lab Test 04/17/19 1818   A1C 6.1*     CRP: 231  Blood cultures x 4: Gram positive cocci in clusters     Imaging/ workup:     CT head without contrast: 4/17/2019   A 2-3 cm area of hypoattenuation and gray-white differentiation loss involving the mid right postcentral gyrus extending into the right parietal white matter. This is suspicious for area of acute/subacute ischemia.    CTA head and neck with contrast: 4/17/2019   CTA HEAD:  1. Patent intracranial circulation.  2. 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   CTA NECK:  Severe  atherosclerotic plaquing at the bilateral carotid bifurcations with approximately 20-30% narrowing of the proximal right internal carotid artery and approximately 40-50% narrowing of the proximal left internal carotid artery.    MRI brain +/- contrast: 4/18/2019   1. Numerous scattered infarcts involving the cerebral and cerebellar hemispheres bilaterally and basal ganglia bilaterally consistent with embolic infarcts from a central embolic source.  2. Diffuse cerebral volume loss and cerebral white matter changes consistent with chronic small vessel ischemic disease    TTE: 04/17/2019   1. The left ventricle is normal in structure, function and size. The visual ejection fraction is estimated at 55%.  2. The right ventricle is normal in structure, function and size.  3. There is mild to moderate mitral stenosis. Mean 8mmHg.  4. There is mild (1+) tricuspid regurgitation. The right ventricular systolic pressure is approximated at 42mmHg plus the right atrial pressure.  5. Dilation of the inferior vena cava is present with abnormal respiratory variation in diameter.  6. There is no atrial shunt seen. A contrast injection (Bubble Study) was performed that was negative for flow across the interatrial septum.    Assessment and Plan:                                           Belgica Klein is a 89 year old female from [facility] with a history of hypertension, osteoarthritis of R knee s/p kenalog injection 4/16/19, and L total hip arthroplasty, suspected TIA with aphasia in 12/2018, and reoccurring hyponatremia, who presented with her daughter for confusion, inability to follow commands, and nonsensical communication. CT on 4/17/19 consistent with Right postcentral gyrus infarct. MRI delayed due to agitation. Pt also found to have fever to 101.3, leukocytosis to 14.7 and blood cultures positive for MSSA - LUTHER with bubble study negative. EKG notable for abnormal rhythm. 4/18/19 pt found to have septic left hip joint with  concepcion pus and taken to OR for washout. MRI completed in the interim and showed numerous acute/subacute infarcts - suspect embolic source from either new onset atrial fibrillation vs septic embolic from vegetations/ MSSA bacteremia    #Numerous acute/subacute infarcts - suspect likely from septic emboli given septic Left hip vs new onset atrial fibrillation   - Neurochecks every 4 hours  - Continue Aspirin 325 mg  - Statin: Lipitor 40 mg daily  - No urgent indication to assess for vegetations, since no change in management, would recommend to continue antibiotics as per ID  - Telemetry monitoring  - PT/OT/SLP  - PM&R  - Stroke Education    #New onset atrial fibrillation  - Rate control for now  - Hold off on anticoagulation     Patient Follow-up    - Final recommendation pending work-up  - Palliative consult to address goals of care    Plan discussed with family at bedside, We will sign off at this time, please call us with any questions or concerns, case seen and discussed with Dr Lupe Hess MD  Vascular Neurology fellow  Pager # [phone]

## 2019-04-21 NOTE — PROVIDER NOTIFICATION
MD updated: pt c/o SOB is tachypneic and using abdominal muscles. Requiring more O2. LS: crackles throughout with expiratory wheezing. Temp 100.0 axillary.    New orders:   *Okay to give scheduled lasix early   *STAT CXR

## 2019-04-21 NOTE — PLAN OF CARE
"Neuro largely unchanged. Pt frequently c/o posterior \"neck\" pain near base of skull, improved with repositioning/gel pillow and prn dilaudid. Tele: SR with frequent PAC's at times, back into ERIC around 0430. One episode of SVT around 5:45AM, pt was asymptomatic, converted out after being instructed to cough. Max temp 100.0Ax, improved with environment changes. Hypertensive at times, no pressors needed. Prn Albuterol neb given x1 with improvement. K+ and Mag replaced. Hemovac patent with 25mL output.   "

## 2019-04-21 NOTE — PROGRESS NOTES
[facility]    Medicine Progress Note - Hospitalist Service        Date of Admission:  4/17/2019  7:56 AM    Assessment & Plan:   Belgica Klein is a 89-year-old female who presents to the Emergency Room with change in mental state and expressive aphasia.      MSSA bacteremia  Septic left total hip arthroplasty status post I&D  Septic shock  Infective endocarditis  -Patient presenting with fever up to 101 at home and worsening left hip pain, concerns for septic left hip  -Blood culture growing staph aureus  -Due to ongoing left hip pain had aspiration of the left hip joint which was concerning for septic joint  -Orthopedic surgery consulted, patient underwent I&D of left GURJIT on 4/18  -Blood Culture and left hip culture growing staph aureus, blood culture from 4/19 and 4/20 sterile thus far  -highconcerns for infective endocarditis given the positive blood culture, septic cerebral emboli and Janeway lesion  -Continues on Ancef, infectious disease following.    Numerous acute/subacute infarcts with suspected septic emboli   Acute encephalopathy   -The patient presents with expressive aphasia, some confusion and possibly left visual field cut.    -CT head was read as 2-3 cm area of hypoattenuation and gray-white differentiation loss involving the mid right postcentral gyrus extending into the right parietal white matter.   -MRI of the brain done on 4/18 reveals numerous scattered infarcts involving the cerebral and cerebellar hemispheres bilaterally and basal ganglia bilaterally consistent with embolic infarcts from a central source   -Neurology following  -Continue aspirin alone for now  -Echocardiogram reviewed, LV normal in structure function and size.  -LUTHER was offered, family declined; this likely represents septic emboli given the clinical context.  -CT head 4/20 appears to be stable, patient continues to be hemiparetic on the left side.    New onset A. fib with RVR:  -Continues on amiodarone  drip  -Now converted to normal sinus rhythm  -Continue aspirin alone as anticoagulation would be high risk for CNS bleed given the concern for septic emboli.     Acute hypoxic respiratory failure  -Was intubated postop, successfully extubated (4/19)  -Respiratory status worse overnight despite diuretics, chest x-ray consistent with pulmonary edema  -Continue IV diuretics  -Incentive spirometry as able  -Currently oxygenation via oxygen mask, discussed with daughter at the bedside, if respiratory status worsens no further intervention.  They do not want even BiPAP.    Hyponatremia.    -Sodium at presentation was 129.  The patient reportedly has had problems with low sodium in the recent past.  She was on hydrochlorothiazide and reportedly was stopped just prior to admission.  -Continue to hold hydrochlorothiazide  -Improved     Benign essential hypertension.    -prior to admission on amlodipine and losartan   -Sharron-procedure was in shock-most likely a combination of septic and cardiogenic rapid A. Fib  -Blood pressure now stable     Hyperlipidemia.    -Continue Lipitor 40 mg daily when able to take orally.     Dysphagia:  -SLP following, recommend a full liquid diet with honey thick liquids by spoon    Goals of care:  -Had a long discussion with the daughter at the bedside.  Given the ongoing left-sided hemiparesis, worsening respiratory status and encephalopathy, and the concern for endocarditis and need for long-term antibiotics they are now leaning towards palliative care and de-escalation of care.  Will consult palliative care to evaluate.  Continue current level of care until seen by palliative care.  No further escalation of care.        Diet: Combination Diet Full Liquid Diet; Honey Thickened Liquids (pre-thickened or use instant food thickener) (By spoon)     DVT Prophylaxis: Pneumatic Compression Devices   Aguilar Catheter: in place, indication: Strict 1-2 Hour I&O  Code Status: DNR/DNI     Disposition Plan   "  Expected discharge: 2 - 3 days    Entered: Chevy Li MD 04/21/2019, 9:38 AM        The patient's care was discussed with the Bedside Nurse, Patient and Critical care Consultant.    Chevy Li MD  Hospitalist Service  [facility]    ______________________________________________________________________    Interval History   Respiratory status worse overnight.  Was more dyspneic.  Continues to be hemiparetic on the left side.  Very weak and tired.  Has stayed in normal sinus rhythm for most part.  T-max of 100  F    Data reviewed today: I reviewed all medications, new labs and imaging results over the last 24 hours. I personally reviewed no images or EKG's today.    Physical Exam   Vital signs:  Temp: 98.8  F (37.1  C) Temp src: Axillary BP: 133/83 Pulse: 90 Heart Rate: 90 Resp: 20 SpO2: 96 % O2 Device: Oxymask Oxygen Delivery: 4 LPM Height: 172.7 cm (5' 8\") Weight: 77.1 kg (169 lb 15.6 oz)  Estimated body mass index is 25.84 kg/m  as calculated from the following:    Height as of this encounter: 1.727 m (5' 8\").    Weight as of this encounter: 77.1 kg (169 lb 15.6 oz).      Wt Readings from Last 2 Encounters:   04/21/19 77.1 kg (169 lb 15.6 oz)       Gen: Awake, oriented to self, follows some simple commands appropriately  HEENT: Supple neck, moist oral mucosa, no pallor  Resp: Coarse breath sounds bilaterally with upper airway wheezes, tachypneic slightly increased work of breathing.  CVS: Irregular, no murmur  Abd/GI: Soft, non-tender. BS- normoactive.   Skin: Warm, dry no rashes  MSK: Left thigh with surgical incision that is bandaged, drain in place.    Neuro-hemiparetic on the left side    Data   Recent Labs   Lab 04/21/19  0410 04/20/19  0433 04/19/19  0408  04/17/19  1818 04/17/19  0805   WBC 17.9* 11.1* 13.6*   < >  --  14.7*   HGB 10.1* 9.7* 9.8*   < >  --  11.4*   MCV 85 86 86   < >  --  87   * 102* 125*   < >  --  199   INR  --   --   --   --   --  1.20*    137 " 137   < >  --  129*   POTASSIUM 3.5 4.4 4.5   < >  --  3.9   CHLORIDE 105 108 108   < >  --  96   CO2 25 22 20   < >  --  22   BUN 35* 38* 36*   < >  --  25   CR 0.72 0.70 0.93   < >  --  0.84   ANIONGAP 9 7 9   < >  --  11   LULU 8.3* 8.3* 8.1*   < >  --  9.7   * 145* 153*   < >  --  136*   ALBUMIN  --   --   --   --  2.5* 3.0*   PROTTOTAL  --   --   --   --  6.4* 7.4   BILITOTAL  --   --   --   --  0.8 0.8   ALKPHOS  --   --   --   --  122 128   ALT  --   --   --   --  29 30   AST  --   --   --   --  26 23   LIPASE  --   --   --   --   --  77    < > = values in this interval not displayed.       Recent Results (from the past 24 hour(s))   CT Head w/o Contrast    Narrative    CT OF THE HEAD WITHOUT CONTRAST April 20, 2019 12:56 PM     HISTORY: Stroke.     TECHNIQUE: Axial CT images of the head from the skull base to the  vertex were acquired without IV contrast. Radiation dose for this scan  was reduced using automated exposure control, adjustment of the mA  and/or kV according to patient size, or iterative reconstruction  technique.    COMPARISON: Head MRI 4/18/2019.    FINDINGS:   INTRACRANIAL CONTENTS: No intracranial hemorrhage or abnormal fluid  collection. Evolving acute infarcts scattered throughout the supra and  infratentorial parenchyma. They are best visualized in the right  frontoparietal cortex, medial left occipital lobe, left caudate, right  thalamus and right cerebellum. No gross mass effect associated with  these infarcts. No CT evidence of a new abnormality compared to the  prior MRI. Superimposed mild presumed chronic small vessel ischemic  change and mild generalized volume loss.    VISUALIZED ORBITS/SINUSES/MASTOIDS: No significant orbital  abnormality. No significant paranasal sinus mucosal disease. No  significant middle ear or mastoid effusion.    OSSEOUS STRUCTURES/SOFT TISSUES: No significant abnormality.      Impression    IMPRESSION:  1.  No change compared to the prior head  MRI.  2.  Overall moderate burden of acute infarcts in the supra and  infratentorial parenchyma. The largest infarcts are in the right  frontoparietal parenchyma in the medial left occipital lobe.  3.  No gross mass effect. No hemorrhagic transformation.    JOZEF GOLDSTEIN MD   XR Chest Port 1 View    Narrative    XR PORTABLE CHEST ONE VIEW 4/20/2019 8:35 PM     COMPARISON: Frontal chest x-ray 4/18/2019.    HISTORY: Dyspnea.      Impression    IMPRESSION: Endotracheal tube has been removed. Right IJ catheter  again noted.    Moderate cardiomegaly persists. Obscuration of the left hemidiaphragm  again noted possibly representing atelectasis, pneumonia, pleural  effusion or combination of these. There is interstitial prominence in  both lungs likely representing at least mild pulmonary edema. There is  no pneumothorax. There is no definite pleural effusion on the right.    BARON ZEPEDA MD     Medications     amiodarone 0.5 mg/min (04/21/19 0903)     - MEDICATION INSTRUCTIONS -       sodium chloride Stopped (04/20/19 2000)       acetaminophen  975 mg Oral Q8H     aspirin  325 mg Oral Daily    Or     aspirin  300 mg Rectal Daily     atorvastatin  10 mg Oral or NG Tube Daily at 8 pm     IV Antibiotic builder   Intravenous Q8H     gadobutrol  10 mL Intravenous Once     insulin aspart  1-7 Units Subcutaneous TID AC     insulin aspart  1-5 Units Subcutaneous At Bedtime     sodium chloride (PF)  3 mL Intracatheter Q8H

## 2019-04-21 NOTE — PROVIDER NOTIFICATION
Update MD, pt diuresing well. LS improved, less crackles but continues with expiratory wheezing. Pt too lethargic/weak for proper use of prn proair inhaler.     New order: Albuterol neb prn Q2H

## 2019-04-21 NOTE — PROGRESS NOTES
Tele-ICU cross-cover  DOS: 4/20/2019     Asked to see the patient regarding dyspnea. She has a higher O2 need (FiO2 50% by facemask, from 40% earlier) and appears to be using her abdominal muscles a bit more. On my evaluation of Ms Klein, she denies feeling short of breath. She does report some neck pain. Per her family members at bedside, she is more alert tonight than she has been.     Vitals show 98.4, 100s-110s, 28-33, 150s-160s/80s-90s  93-98% on 40-50% aerosol mask. She is not in distress. She is belly-breathing a bit, but no nasal flaring or other accessory muscle use noted. Her breath sounds have some wheezes and crackles bilaterally. Her abdomen is soft. Her upper and lower extremities are edematous.     CXR requested; to my interpretation this shows diffuse interstitial infiltrates bilaterally, consistent with edema. By I/O she is about 6 liters positive, and by weight she is ~4 kg up from admission.    Suspect this is pulmonary edema from hypervolemia, fluid shifting, withdrawal of positive pressure, or a combination of factors. She has a dose of furosemide ordered; this has been given. Will follow for effect and repeat if needed. I discussed the idea of BiPAP with her family should Ms Klein's respiratory status decline; they will talk together and think about whether moving to this step would be congruent with the patient's wishes if it becomes necessary.     Compa Huang MD, PhD  Surgical critical care  April 20, 2019, 9:02 PM

## 2019-04-22 ENCOUNTER — APPOINTMENT (OUTPATIENT)
Dept: SPEECH THERAPY | Facility: CLINIC | Age: 84
DRG: 981 | End: 2019-04-22
Payer: MEDICARE

## 2019-04-22 LAB
ANION GAP SERPL CALCULATED.3IONS-SCNC: 6 MMOL/L (ref 3–14)
BUN SERPL-MCNC: 39 MG/DL (ref 7–30)
CALCIUM SERPL-MCNC: 8.1 MG/DL (ref 8.5–10.1)
CHLORIDE SERPL-SCNC: 109 MMOL/L (ref 94–109)
CO2 SERPL-SCNC: 27 MMOL/L (ref 20–32)
CREAT SERPL-MCNC: 0.79 MG/DL (ref 0.52–1.04)
ERYTHROCYTE [DISTWIDTH] IN BLOOD BY AUTOMATED COUNT: 14.9 % (ref 10–15)
GFR SERPL CREATININE-BSD FRML MDRD: 66 ML/MIN/{1.73_M2}
GLUCOSE BLDC GLUCOMTR-MCNC: 155 MG/DL (ref 70–99)
GLUCOSE BLDC GLUCOMTR-MCNC: 156 MG/DL (ref 70–99)
GLUCOSE BLDC GLUCOMTR-MCNC: 176 MG/DL (ref 70–99)
GLUCOSE SERPL-MCNC: 133 MG/DL (ref 70–99)
HCT VFR BLD AUTO: 27.7 % (ref 35–47)
HGB BLD-MCNC: 10.1 G/DL (ref 11.7–15.7)
INTERPRETATION ECG - MUSE: NORMAL
MAGNESIUM SERPL-MCNC: 2.4 MG/DL (ref 1.6–2.3)
MCH RBC QN AUTO: 30.9 PG (ref 26.5–33)
MCHC RBC AUTO-ENTMCNC: 36.5 G/DL (ref 31.5–36.5)
MCV RBC AUTO: 85 FL (ref 78–100)
PHOSPHATE SERPL-MCNC: 3.4 MG/DL (ref 2.5–4.5)
PLATELET # BLD AUTO: 115 10E9/L (ref 150–450)
POTASSIUM SERPL-SCNC: 3.7 MMOL/L (ref 3.4–5.3)
RBC # BLD AUTO: 3.27 10E12/L (ref 3.8–5.2)
SODIUM SERPL-SCNC: 142 MMOL/L (ref 133–144)
WBC # BLD AUTO: 20 10E9/L (ref 4–11)

## 2019-04-22 PROCEDURE — A9270 NON-COVERED ITEM OR SERVICE: HCPCS | Performed by: INTERNAL MEDICINE

## 2019-04-22 PROCEDURE — 84100 ASSAY OF PHOSPHORUS: CPT | Performed by: INTERNAL MEDICINE

## 2019-04-22 PROCEDURE — 99233 SBSQ HOSP IP/OBS HIGH 50: CPT | Performed by: INTERNAL MEDICINE

## 2019-04-22 PROCEDURE — 80048 BASIC METABOLIC PNL TOTAL CA: CPT | Performed by: INTERNAL MEDICINE

## 2019-04-22 PROCEDURE — 25000132 ZZH RX MED GY IP 250 OP 250 PS 637: Performed by: INTERNAL MEDICINE

## 2019-04-22 PROCEDURE — 92526 ORAL FUNCTION THERAPY: CPT | Mod: GN | Performed by: SPEECH-LANGUAGE PATHOLOGIST

## 2019-04-22 PROCEDURE — 20000003 ZZH R&B ICU

## 2019-04-22 PROCEDURE — 25000125 ZZHC RX 250: Performed by: INTERNAL MEDICINE

## 2019-04-22 PROCEDURE — 25800030 ZZH RX IP 258 OP 636: Performed by: INTERNAL MEDICINE

## 2019-04-22 PROCEDURE — 25000128 H RX IP 250 OP 636: Performed by: INTERNAL MEDICINE

## 2019-04-22 PROCEDURE — A9270 NON-COVERED ITEM OR SERVICE: HCPCS | Performed by: PHYSICIAN ASSISTANT

## 2019-04-22 PROCEDURE — 25000132 ZZH RX MED GY IP 250 OP 250 PS 637: Performed by: PHYSICIAN ASSISTANT

## 2019-04-22 PROCEDURE — 99223 1ST HOSP IP/OBS HIGH 75: CPT | Performed by: NURSE PRACTITIONER

## 2019-04-22 PROCEDURE — 00000146 ZZHCL STATISTIC GLUCOSE BY METER IP

## 2019-04-22 PROCEDURE — 85027 COMPLETE CBC AUTOMATED: CPT | Performed by: INTERNAL MEDICINE

## 2019-04-22 PROCEDURE — 83735 ASSAY OF MAGNESIUM: CPT | Performed by: INTERNAL MEDICINE

## 2019-04-22 RX ORDER — AMIODARONE HYDROCHLORIDE 200 MG/1
400 TABLET ORAL DAILY
Status: DISCONTINUED | OUTPATIENT
Start: 2019-04-22 | End: 2019-04-30 | Stop reason: HOSPADM

## 2019-04-22 RX ORDER — SODIUM CHLORIDE 9 MG/ML
INJECTION, SOLUTION INTRAVENOUS CONTINUOUS
Status: ACTIVE | OUTPATIENT
Start: 2019-04-22 | End: 2019-04-23

## 2019-04-22 RX ADMIN — AMIODARONE HYDROCHLORIDE 400 MG: 200 TABLET ORAL at 09:24

## 2019-04-22 RX ADMIN — POTASSIUM CHLORIDE 20 MEQ: 400 INJECTION, SOLUTION INTRAVENOUS at 05:00

## 2019-04-22 RX ADMIN — NAFCILLIN SODIUM 2 G: 2 INJECTION, POWDER, LYOPHILIZED, FOR SOLUTION INTRAMUSCULAR; INTRAVENOUS at 04:52

## 2019-04-22 RX ADMIN — SODIUM CHLORIDE: 9 INJECTION, SOLUTION INTRAVENOUS at 23:16

## 2019-04-22 RX ADMIN — ATORVASTATIN CALCIUM 10 MG: 10 TABLET, FILM COATED ORAL at 20:43

## 2019-04-22 RX ADMIN — ACETAMINOPHEN 650 MG: 325 TABLET, FILM COATED ORAL at 08:43

## 2019-04-22 RX ADMIN — NAFCILLIN SODIUM 2 G: 2 INJECTION, POWDER, LYOPHILIZED, FOR SOLUTION INTRAMUSCULAR; INTRAVENOUS at 08:43

## 2019-04-22 RX ADMIN — NAFCILLIN SODIUM 2 G: 2 INJECTION, POWDER, LYOPHILIZED, FOR SOLUTION INTRAMUSCULAR; INTRAVENOUS at 13:12

## 2019-04-22 RX ADMIN — AMIODARONE HYDROCHLORIDE 0.5 MG/MIN: 50 INJECTION, SOLUTION INTRAVENOUS at 00:44

## 2019-04-22 RX ADMIN — ACETAMINOPHEN 650 MG: 325 TABLET, FILM COATED ORAL at 16:25

## 2019-04-22 RX ADMIN — ASPIRIN 325 MG: 325 TABLET, DELAYED RELEASE ORAL at 09:18

## 2019-04-22 RX ADMIN — NAFCILLIN SODIUM 2 G: 2 INJECTION, POWDER, LYOPHILIZED, FOR SOLUTION INTRAMUSCULAR; INTRAVENOUS at 00:02

## 2019-04-22 RX ADMIN — ACETAMINOPHEN 650 MG: 325 TABLET, FILM COATED ORAL at 20:43

## 2019-04-22 RX ADMIN — NAFCILLIN SODIUM 2 G: 2 INJECTION, POWDER, LYOPHILIZED, FOR SOLUTION INTRAMUSCULAR; INTRAVENOUS at 16:55

## 2019-04-22 RX ADMIN — NAFCILLIN SODIUM 2 G: 2 INJECTION, POWDER, LYOPHILIZED, FOR SOLUTION INTRAMUSCULAR; INTRAVENOUS at 20:43

## 2019-04-22 ASSESSMENT — ACTIVITIES OF DAILY LIVING (ADL)
ADLS_ACUITY_SCORE: 19

## 2019-04-22 ASSESSMENT — MIFFLIN-ST. JEOR: SCORE: 1241.5

## 2019-04-22 NOTE — CONSULTS
[facility]    Palliative Care Consultation     Belgica Klein  MRN# [id number]  Date of Admission:  2019  Date of Service (when I saw the patient): 19  Reason for consult: Consulted by Dr. Li for Goals of care, Patient and family support    Assessment & Plan   Belgica Klein is a 89 year old female with PMH significant for HTN, TIA, HLD, and recurrent UTIs, who presents with AMS and aphasia. Her hospitalization has been complicated by severe sepsis 2/2 septic left total hip arthroplasty and infective endocarditis, found to have MSSA bacteremia, followed by ID and Ortho. She is s/p  I&D septic left GURJIT on . She was also found to have numerous acute/subacute infarcts to her brain, which are suspected septic emboli, Neurology involved. Other complications include a.fib with RVR and acute hypoxic respiratory failure requiring intubation post-op, now successfully extubated and stable. SLP is following for proper diet, on modified textured diet. Overall, it appears pt's condition has been a bit more stable in recent 1-2 days. We are consulted for goals of care and pt and family support.     Symptoms/Recommendations   -Goals of care remain restorative. Our team will continue to follow peripherally during her hospital stay, and will check in with pt/family if her condition changes, requiring further discussion     Support/Coping  -  in . Supportive children (5) present; Chalino, Jatin, Karla, Paula, and Kelsy   -Will involve Palliative LICSW, Priti Hdz, and/or Palliative , Gilda Kumar for additional support     Decisional Support, Goals of Care, Counseling & Coordination  Decisional Capacity Intact?  -Not independently. Benefits from having her family present for complex discussions regarding her health. Certainly reasonable to involve her in the discussion and planning however   Health Care Directive on File?  -No  Code Status/Resuscitation  Preferences?  -DNR/DNI     Discussion  Visited with Belgica, along with her 5 adult children, Kelsy, Paula, Karla, Jatin, and Chalino. Introduced the scope of our practice to pt and family. Discussed our potential roles for symptom management, support/coping, and decisional support (aka goals of care).     Although lethargic, Belgica was able to converse with me and share that she does not feel well. She is complaining of pain in her joints, which likely reflects her arthritis, reduced mobility, and widespread infection.     Belgica and family report that she was highly functional and highly independent right up until this hospitalization. Belgica notes a profound decline. Family was able to explain to her that she has an extensive infection in her hip hardware, heart valves, and some of that infection traveled to her brain.     We talk about how things may look moving forward. I prepare Belgica and family that there is a long road ahead of her, likely weeks to months, of antibiotics and PT/OT/SLP at TCU. We talk about how even with best efforts, motivation, and perseverance, the body is not always able to follow suit. It is reasonable to hope to get back to a highly functional state/independent state of living, but it is not going to be for a notable amount of time, and there is a possibility we may never get there, requiring her to live with nursing assistance to some degree. The hope would be at some point that Belgica would be able to reformulate her new state of health and continue to have good quality of life.     We do talk about how if a new state of health becomes undesirable, or no headway is being made in regard to her progress, shifting focus toward comfort cares/hospice could be reasonable at any point.    Belgica reports having strong inner strength and is motivated to do the work to get stronger. She is familiar with hospice, and reports confidence in the support that hospice can offer. She is aware that it could be an option  for her at any point.    Belgica reports willingness to continue to go through nursing assessments, labs, and testing with the goal of restoring health. We talk about the need for TTE to evaluate her heart function/infection, and would ideally like to avoid the LUTHER if possible.     Belgica does not report being worried about anything. She has good support from her family.    With Belgica's permission, I did speak with her 5 children in private. They were not surprised to hear Belgica's desire to continue to work toward restoring health, given how highly functional she was prior to this hospitalization. We acknowledge time being a major factor in determining how her body will declare itself moving forward, and the uncertainty of what lies ahead while on this journey. They worry about how our conversation may impact her emotional state and motivation (ie wondering if she may be scared about this possibility for death). We talk about how to support her emotional state, while hoping for the best (recovery), and acknowledging the what if scenarios (further decompensation despite ample effort).     Family is very prepared that this illness could be the start of Belgica's declining condition and dying journey. They note that although improved in the last 1-2 days, she could also quickly change for the worse. We talk about having ongoing dialogue and assessment in the hospital, and into the future, as to how she is progressing, and ensuring we are supporting her the way she needs/wants to be supported. Family appears to have good tools in having these discussions as time progresses. I offer to continue to follow them while in the hospital, and will continue conversations particularly if she does decompensate despite our best efforts.     Case reviewed with Dr. Li and bedside RN.     Thank you for involving us in the care of this patient and family. We will continue to follow. Please do not hesitate to contact me with questions or  concerns or the on-call provider for our team if evening or weekend.    Maria Fernanda STRONG, [facility]  Palliative Medicine   Pager [phone]    Attestation:  Total time on the floor involved in the patient's care: 90 minutes  Total time spent in counseling/care coordination: >50%    Chief Complaint   AMS, aphasia     History is obtained from the patient, staff, family and extensive chart review.     Past Medical History    I have reviewed this patient's medical history and updated it with pertinent information if needed.   No past medical history on file.    Past Surgical History   I have reviewed this patient's surgical history and updated it with pertinent information if needed.  No past surgical history on file.    Social History   Living situation: Independently. Spends time in [place] and [place]    Family system:   in . 5 adult children, supportive. 2 live in [place], [place], 1 son in [place]    Self-identified support system: As above     Employment/education: [place]    Activities/interests: Reading, watching netflix series, socializing with family and friends, spending time in [place]. Highly functional and independent at baseline     Use of community resources: None     Gnosticism affiliation: [place]    Involvement in meredith community: [place]    Impact of illness on patient: Pt was highly functional and independent up until this hospitalization. She hopes to get back to a semblance of that life     Family History   I have reviewed this patient's family history and updated it with pertinent information if needed.   No family history on file.    Allergies   Allergies   Allergen Reactions     Penicillins Hives       Medications   Current Facility-Administered Medications Ordered in Epic   Medication Dose Route Frequency Last Rate Last Dose     acetaminophen (TYLENOL) Suppository 650 mg  650 mg Rectal Q4H PRN   650 mg at 19 1914     acetaminophen (TYLENOL) tablet 650 mg  650 mg Oral Q4H PRN   650 mg at 19 0801      albuterol (PROAIR HFA/PROVENTIL HFA/VENTOLIN HFA) 108 (90 Base) MCG/ACT inhaler 2 puff  2 puff Inhalation Q6H PRN         albuterol (PROVENTIL) neb solution 2.5 mg  2.5 mg Nebulization Q2H PRN   2.5 mg at 04/20/19 2312     amiodarone (PACERONE/CODARONE) tablet 400 mg  400 mg Oral Daily   400 mg at 04/22/19 0924     aspirin (ASA) EC tablet 325 mg  325 mg Oral Daily   325 mg at 04/22/19 0918    Or     aspirin (ASA) Suppository 300 mg  300 mg Rectal Daily   300 mg at 04/21/19 0906     atorvastatin (LIPITOR) tablet 10 mg  10 mg Oral or NG Tube Daily at 8 pm         benzocaine-menthol (CHLORASEPTIC) 6-10 MG lozenge 1-2 lozenge  1-2 lozenge Buccal Q1H PRN         benztropine (COGENTIN) tablet 1-2 mg  1-2 mg Oral TID PRN         glucose gel 15-30 g  15-30 g Oral Q15 Min PRN        Or     dextrose 50 % injection 25-50 mL  25-50 mL Intravenous Q15 Min PRN        Or     glucagon injection 1 mg  1 mg Subcutaneous Q15 Min PRN         gadobutrol (GADAVIST) injection 10 mL  10 mL Intravenous Once         HYDROmorphone (PF) (DILAUDID) injection 0.3-0.5 mg  0.3-0.5 mg Intravenous Q1H PRN   0.5 mg at 04/21/19 2009     insulin aspart (NovoLOG) inj (RAPID ACTING)  1-7 Units Subcutaneous TID AC   1 Units at 04/19/19 2029     insulin aspart (NovoLOG) inj (RAPID ACTING)  1-5 Units Subcutaneous At Bedtime         labetalol (NORMODYNE/TRANDATE) syringe 10-40 mg  10-40 mg Intravenous Q10 Min PRN         lidocaine (LMX4) cream   Topical Q1H PRN         lidocaine 1 % 0.1-1 mL  0.1-1 mL Other Q1H PRN         LORazepam (ATIVAN) injection 0.5 mg  0.5 mg Intravenous Once PRN         magnesium sulfate 2 g in water intermittent infusion  2 g Intravenous Daily PRN 50 mL/hr at 04/21/19 0515 2 g at 04/21/19 0515     magnesium sulfate 4 g in 100 mL sterile water (premade)  4 g Intravenous Q4H PRN         Medication Instruction   Does not apply Continuous PRN         melatonin tablet 1 mg  1 mg Oral At Bedtime PRN         nafcillin IV 2 g vial to  attach to  ml bag  2 g Intravenous Q4H   2 g at 04/22/19 1312     naloxone (NARCAN) injection 0.1-0.4 mg  0.1-0.4 mg Intravenous Q2 Min PRN         OLANZapine zydis (zyPREXA) ODT half-tab 2.5 mg  2.5 mg Oral TID PRN         ondansetron (ZOFRAN-ODT) ODT tab 4 mg  4 mg Oral Q6H PRN        Or     ondansetron (ZOFRAN) injection 4 mg  4 mg Intravenous Q6H PRN         potassium chloride (KLOR-CON) Packet 20-40 mEq  20-40 mEq Oral or Feeding Tube Q2H PRN         potassium chloride 10 mEq in 100 mL intermittent infusion with 10 mg lidocaine  10 mEq Intravenous Q1H PRN         potassium chloride 10 mEq in 100 mL sterile water intermittent infusion (premix)  10 mEq Intravenous Q1H PRN         potassium chloride 20 mEq in 50 mL intermittent infusion  20 mEq Intravenous Q1H PRN 50 mL/hr at 04/22/19 0500 20 mEq at 04/22/19 0500     potassium chloride ER (K-DUR/KLOR-CON M) CR tablet 20-40 mEq  20-40 mEq Oral Q2H PRN         senna-docusate (SENOKOT-S/PERICOLACE) 8.6-50 MG per tablet 1 tablet  1 tablet Oral BID PRN        Or     senna-docusate (SENOKOT-S/PERICOLACE) 8.6-50 MG per tablet 2 tablet  2 tablet Oral BID PRN         sodium chloride (PF) 0.9% PF flush 3 mL  3 mL Intracatheter q1 min prn         sodium chloride (PF) 0.9% PF flush 3 mL  3 mL Intracatheter Q8H   3 mL at 04/21/19 1657     sodium chloride 0.9% infusion   Intravenous Continuous 30 mL/hr at 04/22/19 0929       sodium phosphate 15 mmol in sodium chloride 0.9 % intermittent infusion  15 mmol Intravenous Daily PRN 62.5 mL/hr at 04/20/19 0556 15 mmol at 04/20/19 0556     sodium phosphate 20 mmol in sodium chloride 0.9 % intermittent infusion  20 mmol Intravenous Q6H PRN         sodium phosphate 25 mmol in sodium chloride 0.9 % intermittent infusion  25 mmol Intravenous Q8H PRN         traMADol (ULTRAM) tablet 50 mg  50 mg Oral Q6H PRN         No current Epic-ordered outpatient medications on file.       Review of Systems   The comprehensive review of systems is  negative other than noted here and in the assessment/plan.    Palliative Symptom Review (0=no symptom/no concern, 1=mild, 2=moderate, 3=severe):  Pain: 1    Physical Exam   Temp: 98.1  F (36.7  C) Temp src: Oral BP: 117/66 Pulse: 92 Heart Rate: 92 Resp: (!) 31 SpO2: 97 % O2 Device: Nasal cannula Oxygen Delivery: 3 LPM  Vitals:    04/20/19 0600 04/21/19 0400 04/22/19 0400   Weight: 77.2 kg (170 lb 3.1 oz) 77.1 kg (169 lb 15.6 oz) 77.3 kg (170 lb 6.7 oz)     CONSTITUTIONAL: Chronically ill elderly woman seen resting in ICU bed in NAD, lethargic, yet fairly responsive and conversant today. She is calm and cooperative. Family present   HEENT: NCAT  RESPIRATORY: NL respiratory effort on NC  SKIN: Warm and intact. No concerning lesions or rashes on exposed skin surfaces     Data   Results for orders placed or performed during the hospital encounter of 04/17/19 (from the past 24 hour(s))   Glucose by meter   Result Value Ref Range    Glucose 127 (H) 70 - 99 mg/dL   Magnesium (AM Draw)   Result Value Ref Range    Magnesium 2.4 (H) 1.6 - 2.3 mg/dL   Phosphorus (AM Draw)   Result Value Ref Range    Phosphorus 3.4 2.5 - 4.5 mg/dL   CBC (AM Draw)   Result Value Ref Range    WBC 20.0 (H) 4.0 - 11.0 10e9/L    RBC Count 3.27 (L) 3.8 - 5.2 10e12/L    Hemoglobin 10.1 (L) 11.7 - 15.7 g/dL    Hematocrit 27.7 (L) 35.0 - 47.0 %    MCV 85 78 - 100 fl    MCH 30.9 26.5 - 33.0 pg    MCHC 36.5 31.5 - 36.5 g/dL    RDW 14.9 10.0 - 15.0 %    Platelet Count 115 (L) 150 - 450 10e9/L   Basic metabolic panel (AM Draw)   Result Value Ref Range    Sodium 142 133 - 144 mmol/L    Potassium 3.7 3.4 - 5.3 mmol/L    Chloride 109 94 - 109 mmol/L    Carbon Dioxide 27 20 - 32 mmol/L    Anion Gap 6 3 - 14 mmol/L    Glucose 133 (H) 70 - 99 mg/dL    Urea Nitrogen 39 (H) 7 - 30 mg/dL    Creatinine 0.79 0.52 - 1.04 mg/dL    GFR Estimate 66 >60 mL/min/[1.73_m2]    GFR Estimate If Black 76 >60 mL/min/[1.73_m2]    Calcium 8.1 (L) 8.5 - 10.1 mg/dL   Glucose by meter    Result Value Ref Range    Glucose 156 (H) 70 - 99 mg/dL

## 2019-04-22 NOTE — PROGRESS NOTES
[facility]    Medicine Progress Note - Hospitalist Service        Date of Admission:  4/17/2019  7:56 AM    Assessment & Plan:   Belgica Klein is a 89-year-old female who presents to the Emergency Room with change in mental state and expressive aphasia.      MSSA bacteremia  Septic left total hip arthroplasty status post I&D  Septic shock  Infective endocarditis  -Patient presenting with fever up to 101 at home and worsening left hip pain, concerns for septic left hip  -Blood culture growing staph aureus  -Orthopedic surgery consulted, patient underwent I&D of septic left GURJIT on 4/18  -Blood Culture and left hip culture growing staph aureus, blood culture from 4/19 and 4/20 sterile thus far  -highconcerns for infective endocarditis given the positive blood culture, septiccerebral emboli and Janeway lesion  -IV antibiotics now changed to nafcillin, infectious disease following    Numerous acute/subacute infarcts with suspected septic emboli   Acute encephalopathy   -The patient presents with expressive aphasia, some confusion and possibly left visual field cut.    -CT head was read as 2-3 cm area of hypoattenuation and gray-white differentiation loss involving the mid right postcentral gyrus extending into the right parietal white matter.   -MRI of the brain done on 4/18 reveals numerous scattered infarcts involving the cerebral and cerebellar hemispheres bilaterally and basal ganglia bilaterally consistent with embolic infarcts from a central source   -Neurology following  -Continue aspirin alone for now, as anticoagulation would be high risk for hemorrhagic conversion due to septic emboli  -Echocardiogram with normal LV, no obvious valvular vegetation  -LUTHER was offered, family declined; this likely represents septic emboli given the clinical context.  -CT head 4/20 appears to be stable    New onset A. fib with RVR:  -Discontinue amiodarone drip. Start amiodarone 400 mg p.o. daily  -Now converted to  normal sinus rhythm  -Continue aspirin alone as anticoagulation would be high risk for CNS bleed given the concern for septic emboli.   -Pending palliative care evaluation, consider EP evaluation if patient wants to continue restorative care.    Acute hypoxic respiratory failure  -Was intubated postop, successfully extubated (4/19)  -Respiratory status worse 4/20 despite diuretics, chest x-ray consistent with pulmonary edema  -Now off diuretics; continue to monitor ongoing needs for diuretics as needed  -Incentive spirometry as able  -Oxygenation improved, currently on 3 L    Hyponatremia.    -Sodium at presentation was 129.  The patient reportedly has had problems with low sodium in the recent past.  She was on hydrochlorothiazide and reportedly was stopped just prior to admission.  -Continue to hold hydrochlorothiazide  -Improved     Benign essential hypertension.    -prior to admission on amlodipine and losartan   -Sharron-procedure was in shock-most likely a combination of septic and cardiogenic rapid A. Fib  -Blood pressure now stable     Hyperlipidemia.    -Continue Lipitor 40 mg daily when able to take orally.     Dysphagia:  -SLP following, currently on DD-1 diet with honey thickened liquids.    Goals of care:  -Had a long discussion with the daughter at the bedside on 4/21.  She wished for no further escalation of care.  Palliative care consult today.        Diet: Combination Diet Full Liquid Diet; Honey Thickened Liquids (pre-thickened or use instant food thickener) (By spoon)     DVT Prophylaxis: Pneumatic Compression Devices   Aguilar Catheter: in place, indication: Strict 1-2 Hour I&O  Code Status: DNR/DNI     Disposition Plan    Expected discharge: 2 - 3 days    Entered: Chevy Li MD 04/22/2019, 9:02 AM        The patient's care was discussed with the Bedside Nurse, Patient and Critical care Consultant.    Chevy Li MD  Hospitalist Service  [facility]  "Hospital    ______________________________________________________________________    Interval History   Respiratory status slightly better today.  Patient overall she is slightly better.  Endorses pain in the left hip area and cervical area.  Afebrile.  Continues to be on normal sinus rhythm.    Data reviewed today: I reviewed all medications, new labs and imaging results over the last 24 hours. I personally reviewed no images or EKG's today.    Physical Exam   Vital signs:  Temp: 98.1  F (36.7  C) Temp src: Oral BP: 151/75 Pulse: 89 Heart Rate: 89 Resp: 21 SpO2: 96 % O2 Device: Nasal cannula Oxygen Delivery: 3 LPM Height: 172.7 cm (5' 8\") Weight: 77.3 kg (170 lb 6.7 oz)  Estimated body mass index is 25.91 kg/m  as calculated from the following:    Height as of this encounter: 1.727 m (5' 8\").    Weight as of this encounter: 77.3 kg (170 lb 6.7 oz).      Wt Readings from Last 2 Encounters:   04/22/19 77.3 kg (170 lb 6.7 oz)       Gen: Awake, answering questions appropriately, more interactive today  HEENT:no pallor  Resp: Few crackles at the base, otherwise clear to auscultation bilaterally, normal effort of breathing.  CVS: Irregular, no murmur  Abd/GI: Soft, non-tender. BS- normoactive.   Skin: Warm, dry no rashes  MSK: Left thigh with surgical incision is bandaged, drain in place.    Neuro- More lucid and interactive today. Moving left side better    Data   Recent Labs   Lab 04/22/19  0400 04/21/19  0410 04/20/19  0433  04/17/19  1818 04/17/19  0805   WBC 20.0* 17.9* 11.1*   < >  --  14.7*   HGB 10.1* 10.1* 9.7*   < >  --  11.4*   MCV 85 85 86   < >  --  87   * 108* 102*   < >  --  199   INR  --   --   --   --   --  1.20*    139 137   < >  --  129*   POTASSIUM 3.7 3.5 4.4   < >  --  3.9   CHLORIDE 109 105 108   < >  --  96   CO2 27 25 22   < >  --  22   BUN 39* 35* 38*   < >  --  25   CR 0.79 0.72 0.70   < >  --  0.84   ANIONGAP 6 9 7   < >  --  11   LULU 8.1* 8.3* 8.3*   < >  --  9.7   * 149* " 145*   < >  --  136*   ALBUMIN  --   --   --   --  2.5* 3.0*   PROTTOTAL  --   --   --   --  6.4* 7.4   BILITOTAL  --   --   --   --  0.8 0.8   ALKPHOS  --   --   --   --  122 128   ALT  --   --   --   --  29 30   AST  --   --   --   --  26 23   LIPASE  --   --   --   --   --  77    < > = values in this interval not displayed.       No results found for this or any previous visit (from the past 24 hour(s)).  Medications     - MEDICATION INSTRUCTIONS -       sodium chloride 30 mL/hr at 04/21/19 2000       amiodarone  400 mg Oral Daily     aspirin  325 mg Oral Daily    Or     aspirin  300 mg Rectal Daily     atorvastatin  10 mg Oral or NG Tube Daily at 8 pm     gadobutrol  10 mL Intravenous Once     insulin aspart  1-7 Units Subcutaneous TID AC     insulin aspart  1-5 Units Subcutaneous At Bedtime     nafcillin  2 g Intravenous Q4H     sodium chloride (PF)  3 mL Intracatheter Q8H

## 2019-04-22 NOTE — PLAN OF CARE
[facility]   Intensive Care Unit   Nursing Note    Vital signs stable during the day.  PERRL.  Moves all extremities, Right > Left. Follows commands. Pt more alert and interactive with Family and Staff today.  Pt on 5 liters NC. Palliative meeting tomorrow. Pt enjoyed vanilla magic cup. Labs noted.  Continue to monitor closely.      ROUTINE IP LABS (Last four results)  BMP  Recent Labs   Lab 04/21/19  0410 04/20/19  0433 04/19/19  0408 04/18/19  2150 04/18/19  1253    137 137  --  136   POTASSIUM 3.5 4.4 4.5 4.4 3.7   CHLORIDE 105 108 108  --  105   LULU 8.3* 8.3* 8.1*  --  8.8   CO2 25 22 20  --  20   BUN 35* 38* 36*  --  24   CR 0.72 0.70 0.93  --  0.67   * 145* 153*  --  139*     CBC  Recent Labs   Lab 04/21/19  0410 04/20/19  0433 04/19/19  0408 04/18/19  1253   WBC 17.9* 11.1* 13.6* 10.6   RBC 3.32* 3.16* 3.21* 3.52*   HGB 10.1* 9.7* 9.8* 10.9*   HCT 28.1* 27.2* 27.7* 30.5*   MCV 85 86 86 87   MCH 30.4 30.7 30.5 31.0   MCHC 35.9 35.7 35.4 35.7   RDW 14.7 15.0 14.7 14.5   * 102* 125* 137*       Kash Brown RN

## 2019-04-22 NOTE — PLAN OF CARE
Discharge Planner SLP   Patient plan for discharge: Not able to state.   Current status:  Patient was seen for swallow treatment with nurse and family present. Patient demonstrated reduced bolus control and bolus propulsion posteriorly. Tongue pumping noted with liquids and pureed textures. Delayed swallow response with all trials. Patient continue to be both an aspiration risk and nutritional risk. Recommend: 1. Advance diet to DDL 1 (To promote healing) and continue with honey thick liquids by spoon only. 2. Feed only when fully alert, upright, small bites, and alternate liquids/solids. Hold diet if increased coughing noted or vocal changes.   Barriers to return to prior living situation: Acuity of her illness.  Recommendations for discharge: LTC  Rationale for recommendations: Pending out come of a palliative meeting.        Entered by: Kia Toussaint 04/22/2019 9:47 AM

## 2019-04-22 NOTE — PROGRESS NOTES
[facility]    Infectious Disease Progress Note    Date of Service (when I saw the patient): 04/22/2019     Assessment & Plan   Belgica Klein is a 89 year old female who was admitted on 4/17/2019.     ASSESSMENT:  1. 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 INFECTED L  GURJIT  3. CVA, SEPTIC CEREBRAL EMBOLI-Would be best to have her on nafcillin for CNS penetration but pcn allergy; so far tolerating it ok.   4. ENDOCARDITIS-fairly certain given [place] lesions and septic cerebral emboli, would not be a surgical candidate so LUTHER not that necessary as not likely to ;  has new bilateral conjunctival petechiae also c/w left sided endocarditis, despite this she is doing better today neurologically and so far f/u BC are negative  5. PCN ALLERGY-HIVES 5 yrs ago, may still tolerate nafcillin and I think best to switch; think she probably got augmentin for UTI from d/w family, amoxicillin more allergenic c/w nafcillin  6. PROGNOSIS-probably poor, palliative care to see, that said she is improved     REC  1. Change cefazolin to nafcillin 2 g iv q 4 hours--d/w family and patient.   2. Serial BCxs; BC x 1 today  3. Follow neuro, cardiac exam  4. Would avoid anticoagulation given CNS emboli and endocarditis as high risk CNS bleed  5. If aggressive Rx planned consider repeat TTE next week  6. Palliative care to see  D/w family in detail multiple family members present.              Bertram Carrillo MD    Interval History   Blood cultures so far negative 4/18 onward   She is awake visiting with multiple family members     Physical Exam   Temp: 98.1  F (36.7  C) Temp src: Oral BP: 146/74 Pulse: 90 Heart Rate: 90 Resp: 26 SpO2: 97 % O2 Device: Nasal cannula Oxygen Delivery: 3 LPM  Vitals:    04/20/19 0600 04/21/19 0400 04/22/19 0400   Weight:  77.2 kg (170 lb 3.1 oz) 77.1 kg (169 lb 15.6 oz) 77.3 kg (170 lb 6.7 oz)     Vital Signs with Ranges  Temp:  [98.1  F (36.7  C)-98.6  F (37  C)] 98.1  F (36.7  C)  Pulse:  [86-90] 90  Heart Rate:  [73-96] 90  Resp:  [12-27] 26  BP: (112-154)/() 146/74  SpO2:  [96 %-100 %] 97 %    Constitutional: Awake, alert, cooperative, no apparent distress  Lungs: Clear to auscultation bilaterally, no crackles or wheezing  Cardiovascular: Regular rate and rhythm, normal S1 and S2, and no murmur noted  Abdomen: Normal bowel sounds, soft, non-distended, non-tender  Skin: No rashes, no cyanosis, no edema  Other:    Medications     - MEDICATION INSTRUCTIONS -       sodium chloride 30 mL/hr at 04/22/19 0929       amiodarone  400 mg Oral Daily     aspirin  325 mg Oral Daily    Or     aspirin  300 mg Rectal Daily     atorvastatin  10 mg Oral or NG Tube Daily at 8 pm     gadobutrol  10 mL Intravenous Once     insulin aspart  1-7 Units Subcutaneous TID AC     insulin aspart  1-5 Units Subcutaneous At Bedtime     nafcillin  2 g Intravenous Q4H     sodium chloride (PF)  3 mL Intracatheter Q8H       Data   All microbiology laboratory data reviewed.  Recent Labs   Lab Test 04/22/19  0400 04/21/19  0410 04/20/19  0433   WBC 20.0* 17.9* 11.1*   HGB 10.1* 10.1* 9.7*   HCT 27.7* 28.1* 27.2*   MCV 85 85 86   * 108* 102*     Recent Labs   Lab Test 04/22/19  0400 04/21/19  0410 04/20/19  0433   CR 0.79 0.72 0.70     Recent Labs   Lab Test 04/18/19  1253   SED 80*     Recent Labs   Lab Test 04/21/19  1448 04/20/19  0433 04/19/19  0417 04/19/19  0408 04/18/19  1534 04/18/19  1117 04/17/19  2215 04/17/19  2210 04/17/19  1003   CULT No growth after 14 hours No growth after 2 days No growth after 3 days No growth after 3 days Heavy growth  Staphylococcus aureus  Susceptibility testing done on previous specimen  *  Critical Value/Significant Value, preliminary result only, called to and read back by  Mariaa Robledo RN [id number] 4.19.19 SUSI     Culture negative monitoring continues Moderate growth  Staphylococcus aureus  *  Critical Value/Significant Value called to and read back by  Kash Brown RN BIANCA @ 1008 4.19.19 JE   Cultured on the 1st day of incubation:  Staphylococcus aureus  *  Critical Value/Significant Value, preliminary result only, called to and read back by  Vane Guzman RN at 9:10am 4/18/2019 ()    Susceptibility testing done on previous specimen Cultured on the 1st day of incubation:  Staphylococcus aureus  *  Critical Value/Significant Value, preliminary result only, called to and read back by  Maday Wynne RN at 9:55am 4/18/19 ()    Susceptibility testing done on previous specimen Cultured on the 1st day of incubation:  Staphylococcus aureus  *  Critical Value/Significant Value, preliminary result only, called to and read back by  TRA VERA RN [id number] 4.17.19 ND    (Note)  POSITIVE for STAPHYLOCOCCUS AUREUS and NEGATIVE for the mecA gene  (not MRSA) by Gratciigene multiplex nucleic acid test. The mecA gene was  not detected. Final identification and antimicrobial susceptibility  testing will be verified by standard methods.    Specimen tested with Verigene multiplex, gram-positive blood culture  nucleic acid test for the following targets: Staph aureus, Staph  epidermidis, Staph lugdunensis, other Staph species, Enterococcus  faecalis, Enterococcus faecium, Streptococcus species, S. agalactiae,  S. anginosus grp., S. pneumoniae, S. pyogenes, Listeria sp., mecA  (methicillin resistance) and Martin/B (vancomycin resistance).    Critical Value/Significant Value called to and read back by Vaishali Weaver RN on 04.17.2019 at 2358.  JRT

## 2019-04-22 NOTE — PROGRESS NOTES
Orthopedic Surgery  Belgica Klein  2019  Admit Date:  2019  POD 4 Days Post-Op  S/P Procedure(s):  COMBINED IRRIGATION AND DEBRIDEMENT LEFT HIP.    Patient resting comfortably in bed.    Working with speech therapy at time of visit  ICU  Alert to person    Vital Sign Ranges  Temperature Temp  Av.5  F (36.9  C)  Min: 98.1  F (36.7  C)  Max: 98.6  F (37  C)   Blood pressure Systolic (24hrs), Av , Min:112 , Max:154        Diastolic (24hrs), Av, Min:59, Max:100      Pulse Pulse  Av.2  Min: 86  Max: 92   Respirations Resp  Av.4  Min: 12  Max: 27   Pulse oximetry SpO2  Av.3 %  Min: 96 %  Max: 100 %       Dressing is saturated with old blood - replaced with new Aquacel  Staples intact and incision is well approximated  Drain intact and patent - last output measures 20 and 12.5 --> serosanguinous, bloody  Minimal erythema of the surrounding skin.   Bilateral calves are soft, non-tender.  Bilateral lower extremity is NVI.  +Dp pulse    Labs:  Recent Labs   Lab Test 19  0400 19  0410 19  0433   POTASSIUM 3.7 3.5 4.4     Recent Labs   Lab Test 19  0400 19  0410 19  0433   HGB 10.1* 10.1* 9.7*     Recent Labs   Lab Test 19  0805   INR 1.20*     Recent Labs   Lab Test 19  0400 19  0410 19  0433   * 108* 102*       A/P  1. Plan   Continue ASA for DVT prophylaxis.     Mobilize with PT/OT when able   She may be WBAT with walker once able, no hip precautions.     Continue current pain regiment.   Leave new aquacel dressing intact   Palliative care consult today      Shyla Suggs PA-C

## 2019-04-22 NOTE — PLAN OF CARE
OT: From OT note yesterday: patient and daughters requesting IP OT to hold therapy evaluation until after palliative care meeting today. Will hold evaluation until plan of care has been established and until after palliative consult, per pt request.

## 2019-04-23 ENCOUNTER — APPOINTMENT (OUTPATIENT)
Dept: OCCUPATIONAL THERAPY | Facility: CLINIC | Age: 84
DRG: 981 | End: 2019-04-23
Attending: INTERNAL MEDICINE
Payer: MEDICARE

## 2019-04-23 ENCOUNTER — APPOINTMENT (OUTPATIENT)
Dept: SPEECH THERAPY | Facility: CLINIC | Age: 84
DRG: 981 | End: 2019-04-23
Payer: MEDICARE

## 2019-04-23 ENCOUNTER — APPOINTMENT (OUTPATIENT)
Dept: PHYSICAL THERAPY | Facility: CLINIC | Age: 84
DRG: 981 | End: 2019-04-23
Payer: MEDICARE

## 2019-04-23 LAB
ANION GAP SERPL CALCULATED.3IONS-SCNC: 8 MMOL/L (ref 3–14)
ANION GAP SERPL CALCULATED.3IONS-SCNC: 9 MMOL/L (ref 3–14)
BUN SERPL-MCNC: 64 MG/DL (ref 7–30)
BUN SERPL-MCNC: 71 MG/DL (ref 7–30)
CALCIUM SERPL-MCNC: 7.8 MG/DL (ref 8.5–10.1)
CALCIUM SERPL-MCNC: 8 MG/DL (ref 8.5–10.1)
CHLORIDE SERPL-SCNC: 116 MMOL/L (ref 94–109)
CHLORIDE SERPL-SCNC: 116 MMOL/L (ref 94–109)
CK SERPL-CCNC: 35 U/L (ref 30–225)
CO2 SERPL-SCNC: 22 MMOL/L (ref 20–32)
CO2 SERPL-SCNC: 24 MMOL/L (ref 20–32)
CREAT SERPL-MCNC: 1.53 MG/DL (ref 0.52–1.04)
CREAT SERPL-MCNC: 1.8 MG/DL (ref 0.52–1.04)
CREAT UR-MCNC: 33 MG/DL
ERYTHROCYTE [DISTWIDTH] IN BLOOD BY AUTOMATED COUNT: 15.5 % (ref 10–15)
FRACT EXCRET NA UR+SERPL-RTO: 0.7 %
GFR SERPL CREATININE-BSD FRML MDRD: 24 ML/MIN/{1.73_M2}
GFR SERPL CREATININE-BSD FRML MDRD: 29 ML/MIN/{1.73_M2}
GLUCOSE BLDC GLUCOMTR-MCNC: 111 MG/DL (ref 70–99)
GLUCOSE BLDC GLUCOMTR-MCNC: 172 MG/DL (ref 70–99)
GLUCOSE BLDC GLUCOMTR-MCNC: 204 MG/DL (ref 70–99)
GLUCOSE SERPL-MCNC: 120 MG/DL (ref 70–99)
GLUCOSE SERPL-MCNC: 194 MG/DL (ref 70–99)
HCT VFR BLD AUTO: 28.4 % (ref 35–47)
HGB BLD-MCNC: 10.3 G/DL (ref 11.7–15.7)
LACTATE BLD-SCNC: 1.8 MMOL/L (ref 0.7–2)
MAGNESIUM SERPL-MCNC: 2.6 MG/DL (ref 1.6–2.3)
MCH RBC QN AUTO: 30.7 PG (ref 26.5–33)
MCHC RBC AUTO-ENTMCNC: 36.3 G/DL (ref 31.5–36.5)
MCV RBC AUTO: 85 FL (ref 78–100)
PHOSPHATE SERPL-MCNC: 4.8 MG/DL (ref 2.5–4.5)
PLATELET # BLD AUTO: 145 10E9/L (ref 150–450)
POTASSIUM SERPL-SCNC: 3.9 MMOL/L (ref 3.4–5.3)
POTASSIUM SERPL-SCNC: 3.9 MMOL/L (ref 3.4–5.3)
RBC # BLD AUTO: 3.36 10E12/L (ref 3.8–5.2)
SODIUM SERPL-SCNC: 147 MMOL/L (ref 133–144)
SODIUM SERPL-SCNC: 148 MMOL/L (ref 133–144)
SODIUM UR-SCNC: 23 MMOL/L
WBC # BLD AUTO: 22.3 10E9/L (ref 4–11)

## 2019-04-23 PROCEDURE — 25800025 ZZH RX 258: Performed by: INTERNAL MEDICINE

## 2019-04-23 PROCEDURE — 25000132 ZZH RX MED GY IP 250 OP 250 PS 637: Performed by: INTERNAL MEDICINE

## 2019-04-23 PROCEDURE — 25000125 ZZHC RX 250: Performed by: INTERNAL MEDICINE

## 2019-04-23 PROCEDURE — 83605 ASSAY OF LACTIC ACID: CPT | Performed by: INTERNAL MEDICINE

## 2019-04-23 PROCEDURE — 92526 ORAL FUNCTION THERAPY: CPT | Mod: GN

## 2019-04-23 PROCEDURE — 80048 BASIC METABOLIC PNL TOTAL CA: CPT | Performed by: INTERNAL MEDICINE

## 2019-04-23 PROCEDURE — 99291 CRITICAL CARE FIRST HOUR: CPT | Performed by: INTERNAL MEDICINE

## 2019-04-23 PROCEDURE — 84100 ASSAY OF PHOSPHORUS: CPT | Performed by: INTERNAL MEDICINE

## 2019-04-23 PROCEDURE — 25000131 ZZH RX MED GY IP 250 OP 636 PS 637: Performed by: INTERNAL MEDICINE

## 2019-04-23 PROCEDURE — 82550 ASSAY OF CK (CPK): CPT | Performed by: INTERNAL MEDICINE

## 2019-04-23 PROCEDURE — A9270 NON-COVERED ITEM OR SERVICE: HCPCS | Performed by: INTERNAL MEDICINE

## 2019-04-23 PROCEDURE — 82570 ASSAY OF URINE CREATININE: CPT | Performed by: INTERNAL MEDICINE

## 2019-04-23 PROCEDURE — 84300 ASSAY OF URINE SODIUM: CPT | Performed by: INTERNAL MEDICINE

## 2019-04-23 PROCEDURE — 12000000 ZZH R&B MED SURG/OB

## 2019-04-23 PROCEDURE — 97167 OT EVAL HIGH COMPLEX 60 MIN: CPT | Mod: GO | Performed by: OCCUPATIONAL THERAPIST

## 2019-04-23 PROCEDURE — 00000146 ZZHCL STATISTIC GLUCOSE BY METER IP

## 2019-04-23 PROCEDURE — 85027 COMPLETE CBC AUTOMATED: CPT | Performed by: INTERNAL MEDICINE

## 2019-04-23 PROCEDURE — 97530 THERAPEUTIC ACTIVITIES: CPT | Mod: GO | Performed by: OCCUPATIONAL THERAPIST

## 2019-04-23 PROCEDURE — 83735 ASSAY OF MAGNESIUM: CPT | Performed by: INTERNAL MEDICINE

## 2019-04-23 PROCEDURE — 97530 THERAPEUTIC ACTIVITIES: CPT | Mod: GP | Performed by: PHYSICAL THERAPIST

## 2019-04-23 PROCEDURE — 97535 SELF CARE MNGMENT TRAINING: CPT | Mod: GO | Performed by: OCCUPATIONAL THERAPIST

## 2019-04-23 RX ORDER — NITROGLYCERIN 0.4 MG/1
0.4 TABLET SUBLINGUAL EVERY 5 MIN PRN
Status: DISCONTINUED | OUTPATIENT
Start: 2019-04-23 | End: 2019-04-30 | Stop reason: HOSPADM

## 2019-04-23 RX ORDER — LIDOCAINE 40 MG/G
CREAM TOPICAL
Status: DISCONTINUED | OUTPATIENT
Start: 2019-04-23 | End: 2019-04-23

## 2019-04-23 RX ADMIN — NAFCILLIN SODIUM 2 G: 2 INJECTION, POWDER, LYOPHILIZED, FOR SOLUTION INTRAMUSCULAR; INTRAVENOUS at 14:12

## 2019-04-23 RX ADMIN — NAFCILLIN SODIUM 2 G: 2 INJECTION, POWDER, LYOPHILIZED, FOR SOLUTION INTRAMUSCULAR; INTRAVENOUS at 21:53

## 2019-04-23 RX ADMIN — NAFCILLIN SODIUM 2 G: 2 INJECTION, POWDER, LYOPHILIZED, FOR SOLUTION INTRAMUSCULAR; INTRAVENOUS at 05:15

## 2019-04-23 RX ADMIN — NAFCILLIN SODIUM 2 G: 2 INJECTION, POWDER, LYOPHILIZED, FOR SOLUTION INTRAMUSCULAR; INTRAVENOUS at 18:05

## 2019-04-23 RX ADMIN — NAFCILLIN SODIUM 2 G: 2 INJECTION, POWDER, LYOPHILIZED, FOR SOLUTION INTRAMUSCULAR; INTRAVENOUS at 01:29

## 2019-04-23 RX ADMIN — AMIODARONE HYDROCHLORIDE 400 MG: 200 TABLET ORAL at 08:52

## 2019-04-23 RX ADMIN — DEXTROSE AND SODIUM CHLORIDE: 5; 450 INJECTION, SOLUTION INTRAVENOUS at 08:35

## 2019-04-23 RX ADMIN — ASPIRIN 325 MG: 325 TABLET, DELAYED RELEASE ORAL at 08:52

## 2019-04-23 RX ADMIN — INSULIN ASPART 2 UNITS: 100 INJECTION, SOLUTION INTRAVENOUS; SUBCUTANEOUS at 18:04

## 2019-04-23 RX ADMIN — ATORVASTATIN CALCIUM 10 MG: 10 TABLET, FILM COATED ORAL at 21:52

## 2019-04-23 RX ADMIN — NAFCILLIN SODIUM 2 G: 2 INJECTION, POWDER, LYOPHILIZED, FOR SOLUTION INTRAMUSCULAR; INTRAVENOUS at 08:52

## 2019-04-23 ASSESSMENT — ACTIVITIES OF DAILY LIVING (ADL)
ADLS_ACUITY_SCORE: 19
ADLS_ACUITY_SCORE: 17

## 2019-04-23 NOTE — PLAN OF CARE
[id number]  Neuro: Varies.  Pt very tired. Slow to respond at times.  Sensation on face normal to L side duller as well as arms and legs normal to R side duller.L fingers slight contraction to none to command, same with L toes.  Wiggles R toes to command as well as R hand squeeze slight. Sleeps when left alone.  Will take pills crushed with pudding slowly.  Lungs: more increased RR with turning but also pain in L shoulder that decreases with final positioning and Tylenol.    CV: Cont in SR with rare to occasional PAC's.  Amiodarone gtt disc.  GI: Did take Magic shakes with encouragement and total feed.  Incontinent of 2 loose stools.  U/O decreased to 20cc/hr at 1800.  Dr Li notified and IV rate increased to 75cc/hr.  Family at bedside continuously and met with Palliative care today.

## 2019-04-23 NOTE — PROGRESS NOTES
Patient to transfer to 73 after nurse is available for report. Patient up in converter chair this morning. Patient answers a few questions. Opens eyes briefly to voice. 2 large soft BM today. Aguilar with low urine output. Tolerating thickened liquids. Severe generalized weakness - left side weaker than right. Family at bedside all day.

## 2019-04-23 NOTE — PROGRESS NOTES
04/23/19 0916   Quick Adds   Type of Visit Initial Occupational Therapy Evaluation   Living Environment   Lives With alone   Living Arrangements house   Living Environment Comment Pt has a home in [place] and home in [place]. Pt visiting daughters and son in [place] and staying with 1 daughter.    Self-Care   Equipment Currently Used at Home cane, straight   Activity/Exercise/Self-Care Comment started using a SEC when pain got worse.    Functional Level   Ambulation 1-->assistive equipment   Transferring 0-->independent   Toileting 0-->independent   Bathing 0-->independent   Dressing 0-->independent   Eating 0-->independent   Communication 0-->understands/communicates without difficulty   Swallowing 0-->swallows foods/liquids without difficulty   Fall history within last six months no   General Information   Onset of Illness/Injury or Date of Surgery - Date 04/17/19   Referring Physician Chevy Li MD   Patient/Family Goals Statement none stated at this time   Additional Occupational Profile Info/Pertinent History of Current Problem Septic left total hip arthroplasty status post I&D, Septic shock, Infective endocarditis. MRI of the brain done on 4/18 reveals numerous scattered infarcts involving the cerebral and cerebellar hemispheres bilaterally and basal ganglia bilaterally consistent with embolic infarcts from a central source.   Precautions/Limitations fall precautions   Cognitive Status Examination   Orientation person;place  (pt needing reorientation to situation, month, year)   Level of Consciousness lethargic/somnolent   Follows Commands (Cognition) increased processing time needed;delayed response/completion;initiation impaired  (simple 1 step commands 25% of the time.)   Memory impaired   Visual Perception   Visual Perception   (appears to follow, will cont to monitor bilaterally )   Pain Assessment   Patient Currently in Pain   (L UE pain with movement, per familiy, has L shoulder Pain)   Range of Motion  (ROM)   ROM Comment R shoulder to approx 100 degree flexion and L PROM to approx 60 degrees due to baseline arthritis pain and decreased ROM  at baseline. R distal AAROM WFL, L distal PROM WFL. pain in fingers with ROM due to arthritis.    Strength   Strength Comments L UE 0-1/5 due to brief shoulder shrug, difficulty assessing R UE due to decreased command following approx 2-/5   Hand Strength   Hand Strength Comments R  strength poor to fair, no  L hand   Muscle Tone Assessment   Muscle Tone Comments flaccid L UE, some tone starting   Coordination   Upper Extremity Coordination Left UE impaired;Right UE impaired   Gross Motor Coordination TOTAL L UE, R UE mod impairments   Fine Motor Coordination MAX to total FMC B   Bed Mobility Skill: Rolling/Turning   Level of Emmet - Bed Mobility Skill Rolling Turning maximum assist (25% patients effort)   Physical Assist/Nonphysical Assist 2 persons   Transfer Skills   Transfer Comments Pt requires use of ceilingl lift to transfer to comobilizer chair.    Grooming   Level of Emmet: Grooming dependent (less than 25% patients effort)   Instrumental Activities of Daily Living (IADL)   Previous Responsibilities driving;meal prep;laundry;housekeeping;medication management;finances   Activities of Daily Living Analysis   Impairments Contributing to Impaired Activities of Daily Living balance impaired;pain;strength decreased;cognition impaired;coordination impaired;muscle tone abnormal;motor control impaired;ROM decreased   General Therapy Interventions   Planned Therapy Interventions ADL retraining;cognition;neuromuscular re-education;transfer training;visual perception;home program guidelines;progressive activity/exercise   Clinical Impression   Criteria for Skilled Therapeutic Interventions Met yes, treatment indicated   OT Diagnosis decreased ADL's and functional mobility   Influenced by the following impairments impaired balance, B UE decreased strength  "and AROM, no noted AROM in L UE, impaired command following   Assessment of Occupational Performance 5 or more Performance Deficits   Identified Performance Deficits impaired I with dressing, grooming/hygiene, toilet and tub transfer, driving, med mgmt, etc   Clinical Decision Making (Complexity) High complexity   Therapy Frequency daily   Predicted Duration of Therapy Intervention (days/wks) 7 days   Anticipated Discharge Disposition Transitional Care Facility   Risks and Benefits of Treatment have been explained. Yes   Patient, Family & other staff in agreement with plan of care Yes   [facility] AM-PAC  \"6 Clicks\" Daily Activity Inpatient Short Form   1. Putting on and taking off regular lower body clothing? 1 - Total   2. Bathing (including washing, rinsing, drying)? 1 - Total   3. Toileting, which includes using toilet, bedpan or urinal? 1 - Total   4. Putting on and taking off regular upper body clothing? 2 - A Lot   5. Taking care of personal grooming such as brushing teeth? 2 - A Lot   6. Eating meals? 1 - Total   Daily Activity Raw Score (Score out of 24.Lower scores equate to lower levels of function) 8   Total Evaluation Time   Total Evaluation Time (Minutes) 10     "

## 2019-04-23 NOTE — PROVIDER NOTIFICATION
Call received from lab regarding positive blood culture: gram positive cocci in clusters, drawn the 19th. Notified dr vasquez by page of the result at [phone].

## 2019-04-23 NOTE — PLAN OF CARE
Discharge Planner OT   Patient plan for discharge: none stated  Current status: EVal completed and treatment initiated. Pt lives in a house in [place] and has a house in [place], visiting daughters and son in [place], staying with 1 of her daughters. Prior to admit pt just arrived to [place] after spending winter in [place]. Pt very active prior to admit, I with all ADL/IADL and functional mobility, pt just started using SEC due to L hip pain. Septic left total hip arthroplasty status post I&D, Septic shock, Infective endocarditis. MRI of the brain done on  reveals numerous scattered infarcts involving the cerebral and cerebellar hemispheres bilaterally and basal ganglia bilaterally consistent with embolic infarcts from a central source. Pt requires MAX A to roll side to side and use of ceiling lift to transfer to comobilizer chair. Pt following simple 1 step commands approx 25% of the time, slow to respond with repetition needed. Pt requires Max A using R hand to wash face and brush hair.  Pt oriented to name and place only. Pt reoriented to , situation and time, cues to keep eyes open. Some Shoulder shrug noted in L UE, otherwise no noted AROM.   Barriers to return to prior living situation: increased L sided weakness vs R UE, increased A with ADLs and functional mobility, lethargy, decreased command following.   Recommendations for discharge: TCU  Rationale for recommendations: pt was I prior to admit with supportive family and will require intensive therapy to increase ADL and functional mobility I and safety to PLOF. Anticipate pt will be able to tolerate 3 hours of therapy once pt discharges.        Entered by: Maryam Shultz 2019 10:02 AM

## 2019-04-23 NOTE — PLAN OF CARE
Discharge Planner PT   Patient plan for discharge: None stated.  Current status: Opens eyes to name, but falls back to sleep again. Very lethargic, softer BPs. (90s/50s) Family requests pt back to bed. Assisted back to bed via liko lift sheet and Ax 2. Positioned onto the R side for pressure relief.  Pt's L shoulder is very sore at baseline, please take care with movement of the L arm-especially into shoulder flexion and abduction  (raising arm up and to the side).  Barriers to return to prior living situation: Level of assist, lethargy, fatigue  Recommendations for discharge: TCU  Rationale for recommendations: pt was independent prior to admit, will need extensive rehab to promote independence and safety with functional mobility. At this time, frequency reduced to 5x per week as pt not able to functionally participate. Discussed with family.       Entered by: Jeimy Lake 04/23/2019 10:38 AM

## 2019-04-23 NOTE — PROGRESS NOTES
SPIRITUAL HEALTH SERVICES  Spiritual Assessment Progress Note  [facility] ICU   checked in with family at the bedside.  Pt was resting and did not contribute to conversation.  Family pleased that pt is doing better and they are hoping that pt will return to her active self.    Family is very loving and supportive.     No other needs at this time.     SH continues to be available for support as needed.                                                                                                                                            Renay Greer M.Div., [facility]  Staff    Pager [phone]

## 2019-04-23 NOTE — PROGRESS NOTES
Orthopedic Surgery  Belgica Klein  2019  Admit Date:  2019  POD 5 Days Post-Op  S/P Procedure(s):  COMBINED IRRIGATION AND DEBRIDEMENT LEFT HIP.    Patient sleeping on my arrival, daughter present states she has been tired today but has been able to sit up in the chair for a period of time  She did not complain of any pain with transfer     Vital Sign Ranges  Temperature Temp  Av.7  F (37.1  C)  Min: 98.1  F (36.7  C)  Max: 99.1  F (37.3  C)   Blood pressure Systolic (24hrs), Av , Min:93 , Max:151        Diastolic (24hrs), Av, Min:51, Max:100      Pulse Pulse  Av.6  Min: 84  Max: 101   Respirations Resp  Av.5  Min: 19  Max: 33   Pulse oximetry SpO2  Av.3 %  Min: 96 %  Max: 99 %       Dressing is clean, dry, and intact.   Minimal erythema of the surrounding skin.   Bilateral calves are soft, non-tender.  Bilateral lower extremity is NVI.  +Dp pulse    No physical pain response with log roll of the left LE or palpation of the right knee  Right knee with 1+ effusion ([facility] )    Labs:  Recent Labs   Lab Test 19  0400 19  0410 19  0433   POTASSIUM 3.7 3.5 4.4     Recent Labs   Lab Test 19  0400 19  0410 19  0433   HGB 10.1* 10.1* 9.7*     Recent Labs   Lab Test 19  0805   INR 1.20*     Recent Labs   Lab Test 19  0400 19  0410 19  0433   * 108* 102*       A/P  1. Plan     Mobilize with PT/OT when able   May WBAT    No hip restrictions     Continue current pain regiment.   Leave dressings intact      Shyla Suggs PA-C

## 2019-04-23 NOTE — PROGRESS NOTES
[facility]    Medicine Progress Note - Hospitalist Service        Date of Admission:  4/17/2019  7:56 AM    Assessment & Plan:     This is an 89-year-old female with past medical history significant for hypertension, TIA, hyperlipidemia and recurrent UTIs initially presented with altered mental status and aphasia.  Her hospitalization was complicated by severe sepsis secondary to a septic left total hip arthroplasty and infective endocarditis.  He was found to have MSSA bacteremia.  ID and orthopedic was consulted and are following.  She is status post left total hip arthroplasty IND on 4/18/2019.  Culture growing a staph aureus from there as well.  She was found to have numerous acute/subacute infarct to her brain which are suspected secondary to septic emboli.  Neurology was consulted as well.  She was found to have A. fib with RVR which is now controlled with amiodarone.  Initially she was given IV amiodaroneto oral amiodarone currently in sinus rhythm.  No anticoagulation at this time given septic emboli and high risk for hemorrhagic conversion.  Her course was complicated with acute hypoxic respiratory failure requiring intubation and mechanical ventilation.  Postoperatively now she is extubated and stable.  Infectious disease following and she is started on IV nafcillin.  Infectious disease, orthopedics, neurology and palliative care consulted and following.         MSSA bacteremia  Septic left total hip arthroplasty status post I&D  Septic shock  Infective endocarditis  -Patient presenting with fever up to 101 at home and worsening left hip pain, concerns for septic left hip  -Blood culture growing staph aureus  -Orthopedic surgery consulted, patient underwent I&D of septic left GURJIT on 4/18  -Blood Culture and left hip culture growing staph aureus, blood culture from 4/19 and 4/20 negative so far.  -high concerns for infective endocarditis given the positive blood culture, septic cerebral  emboli and Janeway lesion  -IV antibiotics now changed to nafcillin, infectious disease following.    Numerous acute/subacute infarcts with suspected septic emboli   Acute encephalopathy   -The patient presents with expressive aphasia, some confusion and possibly left visual field cut.    -CT head was read as 2-3 cm area of hypoattenuation and gray-white differentiation loss involving the mid right postcentral gyrus extending into the right parietal white matter.   -MRI of the brain done on 4/18 reveals numerous scattered infarcts involving the cerebral and cerebellar hemispheres bilaterally and basal ganglia bilaterally consistent with embolic infarcts from a central source   -Neurology following  -Continue aspirin alone for now, as anticoagulation would be high risk for hemorrhagic conversion due to septic emboli  -Echocardiogram with normal LV, no obvious valvular vegetation  -LUTHER was offered, family declined; this likely represents septic emboli given the clinical context.  -CT head 4/20 appears to be stable      New onset A. fib with RVR:  -Discontinue amiodarone drip. Start amiodarone 400 mg p.o. daily  -Now converted to normal sinus rhythm  -Continue aspirin alone as anticoagulation would be high risk for CNS bleed given the concern for septic emboli.    palliative care consulted, consider EP evaluation if patient wants to continue restorative care.    Acute hypoxic respiratory failure  -Was intubated postop, successfully extubated (4/19)  -Respiratory status worse 4/20 despite diuretics, chest x-ray consistent with pulmonary edema  -Now off diuretics; continue to monitor ongoing needs for diuretics as needed  -Incentive spirometry as able  -Oxygenation improved, currently on 3 L    Hyponatremia.    -Sodium at presentation was 129.  The patient reportedly has had problems with low sodium in the recent past.  She was on hydrochlorothiazide and reportedly was stopped just prior to admission.  -Continue to hold  hydrochlorothiazide  -Improved     Benign essential hypertension.    -prior to admission on amlodipine and losartan   -Sharron-procedure was in shock-most likely a combination of septic and cardiogenic rapid A. Fib  -Blood pressure still have episodes of low blood pressure.  Continue to hold amlodipine and losartan at this time.     Hyperlipidemia.    -Continue Lipitor 40 mg daily when able to take orally.     Dysphagia:  -SLP following, currently on DD-1 diet with honey thickened liquids.    Acute renal failure  This is most likely prerenal, most likely secondary to decreased oral intake and appears of low blood pressures.  Check FENa, check urinary eosinophils, most likely is prerenal I was started on IV fluids 75 mL/h.  Need to rule out interstitial nephritis as she is on IV nafcillin    Hypernatremia  Likely secondary to decreased oral intake of water, start on [facility] line at this time.  Recheck the labs again in the afternoon if the renal failure and sodium improved we can cut down or decrease or stop the IV fluids later today.    Goals of care:  Palliative care is consulted and following.    At this time I will keep her in the ICU today.  As the blood pressure on lower side and has episodes of hypotension.  I will reassess later today if her blood pressure remains stable she can be transferred out of the ICU.  Started on IV fluids 5% dextrose 0.45 normal saline at 75 mL/h.  Check FENa and urinary eosinophils  Continue IV nafcillin at this time.  Last positive culture culture were 4/19/2019 1 of 2+.  Blood culture on 4/20 and 4/21/2019 are negative so far.    Discussed in detail with the patient and family and all questions answered.  Discussed with the nursing staff taking care of the patient.    Total time spent today 30 minutes of critical care time today include chart review, history taking, examination, formation of plan, counseling and coordination of care.       Diet: Combination Diet Dysphagia Diet  "Level 1: Pureed; Honey Thickened Liquids (pre-thickened or use instant food thickener) (By spoon only)  Snacks/Supplements Adult: Other; Vanilla Magic Shake; With Meals     DVT Prophylaxis: Pneumatic Compression Devices   Aguilar Catheter: in place, indication: Strict 1-2 Hour I&O  Code Status: DNR/DNI     Disposition Plan    Expected discharge: 2 - 3 days    Entered: Ar Akins MD 04/23/2019, 11:52 AM        The patient's care was discussed with the Bedside Nurse, Patient and Critical care Consultant.    Ar Akins MD  Hospitalist Service  [facility]    ______________________________________________________________________    Interval History   Patient laying comfortably, with her eyes closed.  Will to open her eyes, denies any pain fever chills nausea vomiting or shortness of breath at this time.    Data reviewed today: I reviewed all medications, new labs and imaging results over the last 24 hours. I personally reviewed no images or EKG's today.    Physical Exam   Vital signs:  Temp: 98  F (36.7  C) Temp src: Axillary BP: 116/58 Pulse: 101 Heart Rate: 89 Resp: 24 SpO2: 100 % O2 Device: Nasal cannula Oxygen Delivery: 3 LPM Height: 172.7 cm (5' 8\") Weight: 77.3 kg (170 lb 6.7 oz)  Estimated body mass index is 25.91 kg/m  as calculated from the following:    Height as of this encounter: 1.727 m (5' 8\").    Weight as of this encounter: 77.3 kg (170 lb 6.7 oz).      Wt Readings from Last 2 Encounters:   04/22/19 77.3 kg (170 lb 6.7 oz)       Gen: Fatigue, in no acute distress.  Y   HEENT:no pallor  Resp:clear to auscultation bilaterally, normal effort of breathing.  CVS: Irregular, no murmur  Abd/GI: Soft, non-tender. BS- normoactive.   Skin: Warm, dry no rashes  MSK: Left lower extremity edema 1+.  Neuro-  have left-sided weakness.    Data   Recent Labs   Lab 04/23/19  0725 04/22/19  0400 04/21/19  0410  04/17/19  1818 04/17/19  0805   WBC 22.3* 20.0* 17.9*   < >  --  14.7*   HGB 10.3* 10.1* 10.1*   " < >  --  11.4*   MCV 85 85 85   < >  --  87   * 115* 108*   < >  --  199   INR  --   --   --   --   --  1.20*   * 142 139   < >  --  129*   POTASSIUM 3.9 3.7 3.5   < >  --  3.9   CHLORIDE 116* 109 105   < >  --  96   CO2 24 27 25   < >  --  22   BUN 64* 39* 35*   < >  --  25   CR 1.53* 0.79 0.72   < >  --  0.84   ANIONGAP 8 6 9   < >  --  11   LULU 8.0* 8.1* 8.3*   < >  --  9.7   * 133* 149*   < >  --  136*   ALBUMIN  --   --   --   --  2.5* 3.0*   PROTTOTAL  --   --   --   --  6.4* 7.4   BILITOTAL  --   --   --   --  0.8 0.8   ALKPHOS  --   --   --   --  122 128   ALT  --   --   --   --  29 30   AST  --   --   --   --  26 23   LIPASE  --   --   --   --   --  77    < > = values in this interval not displayed.       No results found for this or any previous visit (from the past 24 hour(s)).  Medications     dextrose 5% and 0.45% NaCl 75 mL/hr at 04/23/19 0835     - MEDICATION INSTRUCTIONS -         amiodarone  400 mg Oral Daily     aspirin  325 mg Oral Daily    Or     aspirin  300 mg Rectal Daily     atorvastatin  10 mg Oral or NG Tube Daily at 8 pm     gadobutrol  10 mL Intravenous Once     insulin aspart  1-7 Units Subcutaneous TID AC     insulin aspart  1-5 Units Subcutaneous At Bedtime     nafcillin  2 g Intravenous Q4H     sodium chloride (PF)  10 mL Intracatheter Q8H     sodium chloride (PF)  3 mL Intracatheter Q8H

## 2019-04-23 NOTE — PROGRESS NOTES
[facility]    Infectious Disease Progress Note    Date of Service (when I saw the patient): 04/23/2019     Assessment & Plan   Belgica Klein is a 89 year old female who was admitted on 4/17/2019.     ASSESSMENT:  1. S.AUREUS, MSSA BACTEREMIA-f/u BC so far negative 4/19 but would expect likely more prolonged bacteremia,  Has endocarditis based on septic cerebral emboli and Janeway lesions and now conjunctival petechiae.   2. INFECTED L  GURJIT, MSSA in the cultures.   3. CVA, SEPTIC CEREBRAL EMBOLI-Would be best to have her on nafcillin for CNS penetration pcn listed as allergy; so far tolerating it ok.   4. ENDOCARDITIS-fairly certain given Janeway lesions and septic cerebral emboli, would not be a surgical candidate so LUTHER not that necessary as not likely to ;  has new bilateral conjunctival petechiae also c/w left sided endocarditis, despite this she is doing better today neurologically and so far f/u BC are negative.   5. PCN ALLERGY-HIVES 5 yrs ago  6. PROGNOSIS-probably poor, palliative care to see, that said she is improved     REC  1. Nafcillin 2 g iv q 4 hours--d/w family and patient.   2. Serial BCxs  3. Follow neuro, cardiac exam  4. Would avoid anticoagulation given CNS emboli and endocarditis as high risk CNS bleed  5. If aggressive Rx planned consider repeat TTE this week    D/w family in detail multiple family members present.              Bertram Carrillo MD    Interval History   Blood cultures so far negative 4/18 onward   She is more lethargic today   No issues with nafcillin  Creat on the rise     Physical Exam   Temp: 98.9  F (37.2  C) Temp src: Axillary BP: 129/64 Pulse: 92 Heart Rate: 96 Resp: 30 SpO2: 97 % O2 Device: Nasal cannula Oxygen Delivery: 3 LPM  Vitals:    04/20/19 0600 04/21/19 0400 04/22/19 0400   Weight: 77.2 kg (170 lb 3.1 oz) 77.1 kg (169 lb 15.6 oz) 77.3 kg (170 lb 6.7 oz)     Vital Signs with Ranges  Temp:  [98.9  F (37.2  C)-99.1  F (37.3  C)] 98.9  F  (37.2  C)  Pulse:  [] 92  Heart Rate:  [] 96  Resp:  [19-33] 30  BP: ()/(51-97) 129/64  SpO2:  [96 %-99 %] 97 %    Constitutional: sleeping, more lethargic today   Lungs: Clear to auscultation bilaterally, no crackles or wheezing  Cardiovascular: Regular rate and rhythm, normal S1 and S2, and no murmur noted  Abdomen: Normal bowel sounds, soft, non-distended, non-tender  Skin: No rashes, no cyanosis, no edema  Other: multiple lesion on the palms and soles are very painful     Medications     dextrose 5% and 0.45% NaCl 75 mL/hr at 04/23/19 0835     - MEDICATION INSTRUCTIONS -         amiodarone  400 mg Oral Daily     aspirin  325 mg Oral Daily    Or     aspirin  300 mg Rectal Daily     atorvastatin  10 mg Oral or NG Tube Daily at 8 pm     gadobutrol  10 mL Intravenous Once     insulin aspart  1-7 Units Subcutaneous TID AC     insulin aspart  1-5 Units Subcutaneous At Bedtime     nafcillin  2 g Intravenous Q4H     sodium chloride (PF)  10 mL Intracatheter Q8H     sodium chloride (PF)  3 mL Intracatheter Q8H       Data   All microbiology laboratory data reviewed.  Recent Labs   Lab Test 04/23/19  0725 04/22/19  0400 04/21/19  0410   WBC 22.3* 20.0* 17.9*   HGB 10.3* 10.1* 10.1*   HCT 28.4* 27.7* 28.1*   MCV 85 85 85   * 115* 108*     Recent Labs   Lab Test 04/23/19  0725 04/22/19  0400 04/21/19  0410   CR 1.53* 0.79 0.72     Recent Labs   Lab Test 04/18/19  1253   SED 80*     Recent Labs   Lab Test 04/21/19  1448 04/20/19  0433 04/19/19  0417 04/19/19  0408 04/18/19  1534 04/18/19  1117 04/17/19  2215 04/17/19  2210 04/17/19  1003   CULT No growth after 2 days No growth after 3 days No growth after 4 days No growth after 4 days Heavy growth  Staphylococcus aureus  Susceptibility testing done on previous specimen  *  Critical Value/Significant Value, preliminary result only, called to and read back by  Mariaa Robledo RN [id number] 4.19.19 SUSI    Culture negative monitoring continues Moderate  growth  Staphylococcus aureus  *  Critical Value/Significant Value called to and read back by  Kash Brown RN BIANCA @ 1008 4.19.19 JE   Cultured on the 1st day of incubation:  Staphylococcus aureus  *  Critical Value/Significant Value, preliminary result only, called to and read back by  Vane Guzman RN at 9:10am 4/18/2019 ()    Susceptibility testing done on previous specimen Cultured on the 1st day of incubation:  Staphylococcus aureus  *  Critical Value/Significant Value, preliminary result only, called to and read back by  Maday Wynne RN at 9:55am 4/18/19 ()    Susceptibility testing done on previous specimen Cultured on the 1st day of incubation:  Staphylococcus aureus  *  Critical Value/Significant Value, preliminary result only, called to and read back by  TRA VERA RN [id number] 4.17.19 ND    (Note)  POSITIVE for STAPHYLOCOCCUS AUREUS and NEGATIVE for the mecA gene  (not MRSA) by Eximiaigene multiplex nucleic acid test. The mecA gene was  not detected. Final identification and antimicrobial susceptibility  testing will be verified by standard methods.    Specimen tested with Verigene multiplex, gram-positive blood culture  nucleic acid test for the following targets: Staph aureus, Staph  epidermidis, Staph lugdunensis, other Staph species, Enterococcus  faecalis, Enterococcus faecium, Streptococcus species, S. agalactiae,  S. anginosus grp., S. pneumoniae, S. pyogenes, Listeria sp., mecA  (methicillin resistance) and Martin/B (vancomycin resistance).    Critical Value/Significant Value called to and read back by Vaishali Weaver RN on 04.17.2019 at 2358.  JRT

## 2019-04-23 NOTE — PLAN OF CARE
Discharge Planner SLP   Patient plan for discharge: Did not discuss  Current status: Pt tolerated puree solids and 3 oz of nectar thick liquids by straws. However, pt presented with a throat clear x 1, and family reported some coughing with magic shake earlier. Given fluctuations in alertness pt is not appropriate for diet advancement at this time    Continue dysphagia diet 1 and honey thick liquids by spoon or single straw sip. Feed only when fully alert, upright, small bites, and alternate liquids/solids. Hold diet if increased coughing noted or vocal changes. Nursing to ensure adequate thickness from items coming from the kitchen     Barriers to return to prior living situation: Below baseline, nutrition, aspiration risk, medical status   Recommendations for discharge: TCU  Rationale for recommendations: SLP at next level of care for management of dysphagia        Entered by: Nakia Beltran 04/23/2019 2:46 PM

## 2019-04-24 ENCOUNTER — APPOINTMENT (OUTPATIENT)
Dept: GENERAL RADIOLOGY | Facility: CLINIC | Age: 84
DRG: 981 | End: 2019-04-24
Attending: INTERNAL MEDICINE
Payer: MEDICARE

## 2019-04-24 ENCOUNTER — APPOINTMENT (OUTPATIENT)
Dept: ULTRASOUND IMAGING | Facility: CLINIC | Age: 84
DRG: 981 | End: 2019-04-24
Attending: INTERNAL MEDICINE
Payer: MEDICARE

## 2019-04-24 LAB
ALBUMIN SERPL-MCNC: 1.3 G/DL (ref 3.4–5)
ALBUMIN UR-MCNC: 10 MG/DL
ANION GAP SERPL CALCULATED.3IONS-SCNC: 12 MMOL/L (ref 3–14)
APPEARANCE UR: CLEAR
BACTERIA #/AREA URNS HPF: ABNORMAL /HPF
BASOPHILS # BLD AUTO: 0 10E9/L (ref 0–0.2)
BASOPHILS NFR BLD AUTO: 0.1 %
BILIRUB UR QL STRIP: NEGATIVE
BUN SERPL-MCNC: 83 MG/DL (ref 7–30)
C DIFF TOX B STL QL: NEGATIVE
CALCIUM SERPL-MCNC: 7.3 MG/DL (ref 8.5–10.1)
CHLORIDE SERPL-SCNC: 115 MMOL/L (ref 94–109)
CO2 SERPL-SCNC: 22 MMOL/L (ref 20–32)
COLOR UR AUTO: YELLOW
CREAT SERPL-MCNC: 2.19 MG/DL (ref 0.52–1.04)
DIFFERENTIAL METHOD BLD: ABNORMAL
EOSINOPHIL # BLD AUTO: 0.1 10E9/L (ref 0–0.7)
EOSINOPHIL NFR BLD AUTO: 0.3 %
ERYTHROCYTE [DISTWIDTH] IN BLOOD BY AUTOMATED COUNT: 15.9 % (ref 10–15)
GFR SERPL CREATININE-BSD FRML MDRD: 19 ML/MIN/{1.73_M2}
GLUCOSE BLDC GLUCOMTR-MCNC: 111 MG/DL (ref 70–99)
GLUCOSE BLDC GLUCOMTR-MCNC: 131 MG/DL (ref 70–99)
GLUCOSE SERPL-MCNC: 143 MG/DL (ref 70–99)
GLUCOSE UR STRIP-MCNC: NEGATIVE MG/DL
HCT VFR BLD AUTO: 25.6 % (ref 35–47)
HGB BLD-MCNC: 9.3 G/DL (ref 11.7–15.7)
HGB UR QL STRIP: ABNORMAL
IMM GRANULOCYTES # BLD: 0.5 10E9/L (ref 0–0.4)
IMM GRANULOCYTES NFR BLD: 2.6 %
KETONES UR STRIP-MCNC: NEGATIVE MG/DL
LEUKOCYTE ESTERASE UR QL STRIP: NEGATIVE
LYMPHOCYTES # BLD AUTO: 1.2 10E9/L (ref 0.8–5.3)
LYMPHOCYTES NFR BLD AUTO: 5.6 %
MAGNESIUM SERPL-MCNC: 2.7 MG/DL (ref 1.6–2.3)
MCH RBC QN AUTO: 30.8 PG (ref 26.5–33)
MCHC RBC AUTO-ENTMCNC: 36.3 G/DL (ref 31.5–36.5)
MCV RBC AUTO: 85 FL (ref 78–100)
MONOCYTES # BLD AUTO: 0.6 10E9/L (ref 0–1.3)
MONOCYTES NFR BLD AUTO: 3.1 %
NEUTROPHILS # BLD AUTO: 18.3 10E9/L (ref 1.6–8.3)
NEUTROPHILS NFR BLD AUTO: 88.3 %
NITRATE UR QL: NEGATIVE
NRBC # BLD AUTO: 0 10*3/UL
NRBC BLD AUTO-RTO: 0 /100
PH UR STRIP: 6 PH (ref 5–7)
PHOSPHATE SERPL-MCNC: 5.5 MG/DL (ref 2.5–4.5)
PLATELET # BLD AUTO: 158 10E9/L (ref 150–450)
POTASSIUM SERPL-SCNC: 3.9 MMOL/L (ref 3.4–5.3)
RBC # BLD AUTO: 3.02 10E12/L (ref 3.8–5.2)
RBC #/AREA URNS AUTO: 4 /HPF (ref 0–2)
SODIUM SERPL-SCNC: 149 MMOL/L (ref 133–144)
SOURCE: ABNORMAL
SP GR UR STRIP: 1.01 (ref 1–1.03)
SPECIMEN SOURCE: NORMAL
UROBILINOGEN UR STRIP-MCNC: NORMAL MG/DL (ref 0–2)
WBC # BLD AUTO: 20.8 10E9/L (ref 4–11)
WBC #/AREA URNS AUTO: 9 /HPF (ref 0–5)

## 2019-04-24 PROCEDURE — 71045 X-RAY EXAM CHEST 1 VIEW: CPT

## 2019-04-24 PROCEDURE — 25000132 ZZH RX MED GY IP 250 OP 250 PS 637: Performed by: INTERNAL MEDICINE

## 2019-04-24 PROCEDURE — 76770 US EXAM ABDO BACK WALL COMP: CPT

## 2019-04-24 PROCEDURE — 80069 RENAL FUNCTION PANEL: CPT | Performed by: INTERNAL MEDICINE

## 2019-04-24 PROCEDURE — A9270 NON-COVERED ITEM OR SERVICE: HCPCS | Performed by: INTERNAL MEDICINE

## 2019-04-24 PROCEDURE — 99233 SBSQ HOSP IP/OBS HIGH 50: CPT | Performed by: INTERNAL MEDICINE

## 2019-04-24 PROCEDURE — 99233 SBSQ HOSP IP/OBS HIGH 50: CPT | Performed by: NURSE PRACTITIONER

## 2019-04-24 PROCEDURE — 25000125 ZZHC RX 250: Performed by: INTERNAL MEDICINE

## 2019-04-24 PROCEDURE — 85025 COMPLETE CBC W/AUTO DIFF WBC: CPT | Performed by: INTERNAL MEDICINE

## 2019-04-24 PROCEDURE — 12000000 ZZH R&B MED SURG/OB

## 2019-04-24 PROCEDURE — 00000146 ZZHCL STATISTIC GLUCOSE BY METER IP

## 2019-04-24 PROCEDURE — P9047 ALBUMIN (HUMAN), 25%, 50ML: HCPCS | Performed by: INTERNAL MEDICINE

## 2019-04-24 PROCEDURE — 81001 URINALYSIS AUTO W/SCOPE: CPT | Performed by: INTERNAL MEDICINE

## 2019-04-24 PROCEDURE — 83735 ASSAY OF MAGNESIUM: CPT | Performed by: INTERNAL MEDICINE

## 2019-04-24 PROCEDURE — 25800025 ZZH RX 258: Performed by: INTERNAL MEDICINE

## 2019-04-24 PROCEDURE — 87493 C DIFF AMPLIFIED PROBE: CPT | Performed by: INTERNAL MEDICINE

## 2019-04-24 PROCEDURE — 25000128 H RX IP 250 OP 636: Performed by: INTERNAL MEDICINE

## 2019-04-24 RX ORDER — ALBUMIN (HUMAN) 12.5 G/50ML
25 SOLUTION INTRAVENOUS ONCE
Status: COMPLETED | OUTPATIENT
Start: 2019-04-24 | End: 2019-04-24

## 2019-04-24 RX ADMIN — DEXTROSE AND SODIUM CHLORIDE: 5; 450 INJECTION, SOLUTION INTRAVENOUS at 01:24

## 2019-04-24 RX ADMIN — NAFCILLIN SODIUM 2 G: 2 INJECTION, POWDER, LYOPHILIZED, FOR SOLUTION INTRAMUSCULAR; INTRAVENOUS at 18:05

## 2019-04-24 RX ADMIN — HYDROMORPHONE HYDROCHLORIDE 0.3 MG: 1 INJECTION, SOLUTION INTRAMUSCULAR; INTRAVENOUS; SUBCUTANEOUS at 14:36

## 2019-04-24 RX ADMIN — NAFCILLIN SODIUM 2 G: 2 INJECTION, POWDER, LYOPHILIZED, FOR SOLUTION INTRAMUSCULAR; INTRAVENOUS at 01:26

## 2019-04-24 RX ADMIN — DEXTROSE AND SODIUM CHLORIDE: 5; 450 INJECTION, SOLUTION INTRAVENOUS at 18:14

## 2019-04-24 RX ADMIN — NAFCILLIN SODIUM 2 G: 2 INJECTION, POWDER, LYOPHILIZED, FOR SOLUTION INTRAMUSCULAR; INTRAVENOUS at 20:39

## 2019-04-24 RX ADMIN — HYDROMORPHONE HYDROCHLORIDE 0.5 MG: 1 INJECTION, SOLUTION INTRAMUSCULAR; INTRAVENOUS; SUBCUTANEOUS at 13:20

## 2019-04-24 RX ADMIN — NAFCILLIN SODIUM 2 G: 2 INJECTION, POWDER, LYOPHILIZED, FOR SOLUTION INTRAMUSCULAR; INTRAVENOUS at 09:30

## 2019-04-24 RX ADMIN — NAFCILLIN SODIUM 2 G: 2 INJECTION, POWDER, LYOPHILIZED, FOR SOLUTION INTRAMUSCULAR; INTRAVENOUS at 13:43

## 2019-04-24 RX ADMIN — ASPIRIN 300 MG: 300 SUPPOSITORY RECTAL at 09:30

## 2019-04-24 RX ADMIN — NAFCILLIN SODIUM 2 G: 2 INJECTION, POWDER, LYOPHILIZED, FOR SOLUTION INTRAMUSCULAR; INTRAVENOUS at 04:57

## 2019-04-24 RX ADMIN — ALBUMIN HUMAN 25 G: 0.25 SOLUTION INTRAVENOUS at 12:46

## 2019-04-24 ASSESSMENT — ACTIVITIES OF DAILY LIVING (ADL)
ADLS_ACUITY_SCORE: 19
ADLS_ACUITY_SCORE: 16
ADLS_ACUITY_SCORE: 16
ADLS_ACUITY_SCORE: 17

## 2019-04-24 ASSESSMENT — MIFFLIN-ST. JEOR: SCORE: 1307.65

## 2019-04-24 NOTE — PLAN OF CARE
Pt here with MSSA bacteremia and stroke. Lethargic&O to self and place. Generalized weakness, more on left. Not following most command. VSS. Tele SR with BBB. DD1 diet, honey thick liquids. Takes pills crush in pudding. Up with lift with 2 assist. Bedrest. Repositioning q 2hrs. Had large loose stool. Aguilar o/p 150cc. Denies pain. Bedtime blood sugar 172. Plan continue to monitor. Discharge to TCU pending.

## 2019-04-24 NOTE — PLAN OF CARE
Pt here with sepsis, secondary to LTHA. Lethargic and oriented to self. Does not follow commands when asked. Slight contraction and localized movement to all extremities. L-side weakness noticeable. Baseline shoulder pain, sensitive to touch when being repositioned. Aguilar patent with minimal output.  VSS on 2L O2. Tele SR w/ BBB. DD1 diet, honey thick liquids. Takes pills crushed in pudding.  Denies pain but non-verbal indicators during repositioning. Decreased with rest. Soft/loose stool x4 tonight. Order received for C.diff sample, sent to lab at approx 0515. Results pending. Dyspnea upon exertion/lowering HOB. Infrequent wet cough, O2 sats stable. Pt currently resting in bed, repositioned and oral cares q2h. Discharge plan pending.

## 2019-04-24 NOTE — PROGRESS NOTES
[facility]    Infectious Disease Progress Note    Date of Service (when I saw the patient): 04/24/2019     Assessment & Plan   Belgica Klein is a 89 year old female who was admitted on 4/17/2019.     ASSESSMENT:  1. S.AUREUS, MSSA BACTEREMIA-f/u BC so far negative 4/19 but would expect likely more prolonged bacteremia,  Has endocarditis based on septic cerebral emboli and Janeway lesions and now conjunctival petechiae.   2. INFECTED L  GURJIT, MSSA in the cultures.   3. CVA, SEPTIC CEREBRAL EMBOLI-Would be best to have her on nafcillin for CNS penetration pcn listed as allergy; so far tolerating it ok.   4. ENDOCARDITIS-fairly certain given Janeway lesions and septic cerebral emboli, would not be a surgical candidate so LUTHER not that necessary as not likely to ;  has new bilateral conjunctival petechiae also c/w left sided endocarditis, despite this she is doing better today neurologically and so far f/u BC are negative.   5. PCN ALLERGY-HIVES 5 yrs ago  6. PROGNOSIS-probably poor  7. Diarrhea, being ruled out for C diff.   8. Cultures after being negative for a while now again positive from 4/19.   9. WENDI.      REC  1. Nafcillin 2 g iv q 4 hours--d/w family and patient. Noted worsening renal function, concern for nafcillin related vs sepsis, endocarditis, embolic in nature, renal consulted, antibiotics choices are limited, Ancef and daptomycin have poor CNS penetration. Vancomycin potentially nephrotoxic. Renal studies pending, discussed with family members in the room.   2. Serial BCxs to continue given cultures from 4/19 are now positive 5 days later.   3. Follow neuro, cardiac exam, family refusing frequent exams, even though patient is more somnolent lately.   4. Would avoid anticoagulation given CNS emboli and endocarditis as high risk CNS bleed  5. If aggressive Rx planned consider repeat TTE this week    D/w family              Bertram Carrillo MD    Interval History   Blood cultures  from 4/19 now pos  Moved to the 7 th floor    lethargic today   No rashes with nafcillin  Creat on the rise     Physical Exam   Temp: 97.7  F (36.5  C) Temp src: Axillary BP: 127/51 Pulse: 91 Heart Rate: 95 Resp: 22 SpO2: 98 % O2 Device: Nasal cannula Oxygen Delivery: 1 LPM  Vitals:    04/21/19 0400 04/22/19 0400 04/24/19 0500   Weight: 77.1 kg (169 lb 15.6 oz) 77.3 kg (170 lb 6.7 oz) 83.9 kg (185 lb)     Vital Signs with Ranges  Temp:  [97.7  F (36.5  C)-98.8  F (37.1  C)] 97.7  F (36.5  C)  Pulse:  [] 91  Heart Rate:  [89-98] 95  Resp:  [16-33] 22  BP: ()/(41-60) 127/51  SpO2:  [95 %-100 %] 98 %    Constitutional: sleeping, more lethargic today   Lungs: Clear to auscultation bilaterally, no crackles or wheezing  Cardiovascular: Regular rate and rhythm, normal S1 and S2, and no murmur noted  Abdomen: Normal bowel sounds, soft, non-distended, non-tender  Skin: No rashes, no cyanosis, no edema  Other: multiple lesion on the palms and soles are very painful     Medications     dextrose 5% and 0.45% NaCl 75 mL/hr at 04/24/19 0124     - MEDICATION INSTRUCTIONS -         amiodarone  400 mg Oral Daily     aspirin  325 mg Oral Daily    Or     aspirin  300 mg Rectal Daily     atorvastatin  10 mg Oral or NG Tube Daily at 8 pm     insulin aspart  1-7 Units Subcutaneous TID AC     insulin aspart  1-5 Units Subcutaneous At Bedtime     nafcillin  2 g Intravenous Q4H     sodium chloride (PF)  10 mL Intracatheter Q8H     sodium chloride (PF)  3 mL Intracatheter Q8H       Data   All microbiology laboratory data reviewed.  Recent Labs   Lab Test 04/24/19  0645 04/23/19  0725 04/22/19  0400   WBC 20.8* 22.3* 20.0*   HGB 9.3* 10.3* 10.1*   HCT 25.6* 28.4* 27.7*   MCV 85 85 85    145* 115*     Recent Labs   Lab Test 04/24/19  0645 04/23/19  1515 04/23/19  0725   CR 2.19* 1.80* 1.53*     Recent Labs   Lab Test 04/18/19  1253   SED 80*     Recent Labs   Lab Test 04/21/19  1448 04/20/19  0433 04/19/19  0417  04/19/19  0408 04/18/19  1534 04/18/19  1117 04/17/19  2215 04/17/19  2210 04/17/19  1003   CULT No growth after 3 days No growth after 4 days Cultured on the 5th day of incubation:  Gram positive cocci in clusters  *  Critical Value/Significant Value, preliminary result only, called to and read back by  Nakia Castro RN on 4.23.19 at 0932. bw    Susceptibility testing done on previous specimen No growth after 5 days Heavy growth  Staphylococcus aureus  Susceptibility testing done on previous specimen  *  Critical Value/Significant Value, preliminary result only, called to and read back by  Mariaa Robledo RN [id number] 4.19.19 SUSI    Culture negative monitoring continues Moderate growth  Staphylococcus aureus  *  Critical Value/Significant Value called to and read back by  Kash Brown RN [facility] @ 1008 4.19.19 JE   Cultured on the 1st day of incubation:  Staphylococcus aureus  *  Critical Value/Significant Value, preliminary result only, called to and read back by  Vane Guzman RN at 9:10am 4/18/2019 ()    Susceptibility testing done on previous specimen Cultured on the 1st day of incubation:  Staphylococcus aureus  *  Critical Value/Significant Value, preliminary result only, called to and read back by  Maday Wynne RN at 9:55am 4/18/19 ()    Susceptibility testing done on previous specimen Cultured on the 1st day of incubation:  Staphylococcus aureus  *  Critical Value/Significant Value, preliminary result only, called to and read back by  TRA VERA RN 2123 4.17.19 NDP    (Note)  POSITIVE for STAPHYLOCOCCUS AUREUS and NEGATIVE for the mecA gene  (not MRSA) by UsherBuddyigene multiplex nucleic acid test. The mecA gene was  not detected. Final identification and antimicrobial susceptibility  testing will be verified by standard methods.    Specimen tested with Verigene multiplex, gram-positive blood culture  nucleic acid test for the following targets: Staph aureus, Staph  epidermidis, Staph lugdunensis, other  Staph species, Enterococcus  faecalis, Enterococcus faecium, Streptococcus species, S. agalactiae,  S. anginosus grp., S. pneumoniae, S. pyogenes, Listeria sp., mecA  (methicillin resistance) and Martin/B (vancomycin resistance).    Critical Value/Significant Value called to and read back by Vaishali Weaver RN on 04.17.2019 at 5542.  JRT

## 2019-04-24 NOTE — PLAN OF CARE
PT: Pt remains lethargic/somnolent. Per chart review and discussion with nursing, pt not appropriate for PT at this time, pt's family requesting that pt not be woken up at this time. PT cancel this date.

## 2019-04-24 NOTE — PROVIDER NOTIFICATION
Brief update:    Paged for C diff PCR order    Frequent loose stools over past 2 days.   On abx for septic emboli, septic hip and shoulder, MRSA bacteremia w/ suspicion for endocarditis.    Significant pain in shoulder and hip; unclear if abdominal pain in this setting.    Suspect diarrhea related to antibiotics, but will add C diff PCR and enteric precautions as patient's white count has actually been increasing to >20K range despite antibiotic therapies.    Calixto Simms MD  2:21 AM

## 2019-04-24 NOTE — PROGRESS NOTES
Orthopedic Surgery  Belgica Klein  2019  Admit Date:  2019  POD 6 Days Post-Op  S/P Procedure(s):  COMBINED IRRIGATION AND DEBRIDEMENT LEFT HIP.    Patient resting comfortably in bed.    No events overnight.     Vital Sign Ranges  Temperature Temp  Av.2  F (36.8  C)  Min: 97.7  F (36.5  C)  Max: 98.8  F (37.1  C)   Blood pressure Systolic (24hrs), Av , Min:96 , Max:130        Diastolic (24hrs), Av, Min:41, Max:60      Pulse Pulse  Av.9  Min: 90  Max: 101   Respirations Resp  Av.5  Min: 16  Max: 33   Pulse oximetry SpO2  Av.9 %  Min: 95 %  Max: 100 %       Dressing is clean, dry, and intact.   Minimal erythema of the surrounding skin.   Bilateral calves are soft  Bilateral lower extremity is NVI.  +Dp pulse    Right knee effusion improving  No erythema  No physical signs of pain with palpation    Labs:  Recent Labs   Lab Test 19  0645 19  1515 19  0725   POTASSIUM 3.9 3.9 3.9     Recent Labs   Lab Test 19  0645 19  0725 19  0400   HGB 9.3* 10.3* 10.1*     Recent Labs   Lab Test 19  0805   INR 1.20*     Recent Labs   Lab Test 19  0645 19  0725 19  0400    145* 115*       A/P  1. Plan   DVT prophylaxis per neuro/medicine.     Mobilize with PT/OT    WBAT when able   No hip restrictions.     Continue current pain regiment.   Leave dressings intact    Shyla Suggs PA-C

## 2019-04-24 NOTE — PROGRESS NOTES
[facility]    Palliative Care Progress Note    Belgica Klein  MRN# [id number]  Date of Admission:  4/17/2019  Date of Service (when I saw the patient): 04/24/2019    Assessment & Plan   Belgica Klein is a 89 year old female with PMH significant for HTN, TIA, HLD, and recurrent UTIs, who presents with AMS and aphasia. Her hospitalization has been complicated by severe sepsis 2/2 septic left total hip arthroplasty and infective endocarditis, found to have MSSA bacteremia, followed by ID and Ortho. She is s/p  I&D septic left GURJIT on 4/18. She was also found to have numerous acute/subacute infarcts to her brain, which are suspected septic emboli, Neurology involved. Other complications include a.fib with RVR and acute hypoxic respiratory failure requiring intubation post-op, now successfully extubated and stable. SLP is following for proper diet, on modified textured diet. Overall, it appeared pt's condition was a bit more stable in recent days, but now with worsening WENDI and recurrent encephalopathy. We are following for goals of care and pt and family support.      Symptoms/Recommendations   1. Goals of care. With pt's worsening encephalopathy and WENDI, family is appropriately wondering if this may be Belgica's declining condition and health, and dying process. At this time, it does appear that they want to avoid a lot of testing (CT Head), but not necessarily completely ready to shift toward comfort cares. They are grateful that she is appearing comfortable at this time, although nursing reports pt grimacing in pain with repositioning. Family appears to want to see how the coming days shake out, which seems reasonable. I offer to meet again with family in the coming days, and evaluate where we are in comparison to Monday earlier this week. They will reach out to me, or the RN, if they want to set up a meeting.    2. Encephalopathy. Ativan and other sedating medications were appropriately discontinued.  Dilaudid for pain should be used judiciously. Family declining CT head at this time.      Support/Coping  -  in . Supportive children (5), 2 local; Chalino, Jatin, Karla, Paula, and Kelsy   -Family declining spiritual health support at this time. They will call upon request      Decisional Support, Goals of Care, Counseling & Coordination  Decisional Capacity Intact?  -No  Health Care Directive on File?  -No  Code Status/Resuscitation Preferences?  -DNR/DNI     Case reviewed with bedside RN Ijeoma.     Thank you for involving us in the care of this patient and family. We will continue to follow. Please do not hesitate to contact me with questions or concerns or the on-call provider for our team if evening or weekend.    Maria Fernanda STRONG, [facility]  Palliative Medicine   Pager [phone]    Attestation:  Total time on the floor involved in the patient's care: 35 minutes  Total time spent in counseling/care coordination: >50%    Interval History   Pt transferred out of the ICU. Hemodynamically stable, but now with new WENDI and worsening leukocytosis. She is also more encephalopathic, unable to wake up enough to take in PO.     Medications   Current Facility-Administered Medications Ordered in Epic   Medication Dose Route Frequency Last Rate Last Dose     acetaminophen (TYLENOL) Suppository 650 mg  650 mg Rectal Q4H PRN   650 mg at 194     acetaminophen (TYLENOL) tablet 650 mg  650 mg Oral Q4H PRN   650 mg at 193     albumin human 25 % injection 25 g  25 g Intravenous Once   25 g at 19 1246     albuterol (PROAIR HFA/PROVENTIL HFA/VENTOLIN HFA) 108 (90 Base) MCG/ACT inhaler 2 puff  2 puff Inhalation Q6H PRN         albuterol (PROVENTIL) neb solution 2.5 mg  2.5 mg Nebulization Q2H PRN   2.5 mg at 19 2312     amiodarone (PACERONE/CODARONE) tablet 400 mg  400 mg Oral Daily   400 mg at 19 0852     aspirin (ASA) EC tablet 325 mg  325 mg Oral Daily   325 mg at 19 0852    Or      aspirin (ASA) Suppository 300 mg  300 mg Rectal Daily   300 mg at 04/24/19 0930     atorvastatin (LIPITOR) tablet 10 mg  10 mg Oral or NG Tube Daily at 8 pm   10 mg at 04/23/19 2152     benzocaine-menthol (CHLORASEPTIC) 6-10 MG lozenge 1-2 lozenge  1-2 lozenge Buccal Q1H PRN         benztropine (COGENTIN) tablet 1-2 mg  1-2 mg Oral TID PRN         dextrose 5% and 0.45% NaCl infusion   Intravenous Continuous 100 mL/hr at 04/24/19 1103       glucose gel 15-30 g  15-30 g Oral Q15 Min PRN        Or     dextrose 50 % injection 25-50 mL  25-50 mL Intravenous Q15 Min PRN        Or     glucagon injection 1 mg  1 mg Subcutaneous Q15 Min PRN         HYDROmorphone (PF) (DILAUDID) injection 0.3-0.5 mg  0.3-0.5 mg Intravenous Q1H PRN   0.5 mg at 04/21/19 2009     hypromellose-dextran (ARTIFICAL TEARS) 0.1-0.3 % ophthalmic solution 2 drop  2 drop Ophthalmic Q1H PRN         insulin aspart (NovoLOG) inj (RAPID ACTING)  1-7 Units Subcutaneous TID AC   2 Units at 04/23/19 1804     insulin aspart (NovoLOG) inj (RAPID ACTING)  1-5 Units Subcutaneous At Bedtime         labetalol (NORMODYNE/TRANDATE) syringe 10-40 mg  10-40 mg Intravenous Q10 Min PRN         lidocaine (LMX4) cream   Topical Q1H PRN         lidocaine 1 % 0.1-1 mL  0.1-1 mL Other Q1H PRN         magnesium sulfate 2 g in water intermittent infusion  2 g Intravenous Daily PRN 50 mL/hr at 04/21/19 0515 2 g at 04/21/19 0515     magnesium sulfate 4 g in 100 mL sterile water (premade)  4 g Intravenous Q4H PRN         Medication Instruction   Does not apply Continuous PRN         melatonin tablet 1 mg  1 mg Oral At Bedtime PRN         nafcillin IV 2 g vial to attach to  ml bag  2 g Intravenous Q4H   2 g at 04/24/19 0930     naloxone (NARCAN) injection 0.1-0.4 mg  0.1-0.4 mg Intravenous Q2 Min PRN         nitroGLYcerin (NITROSTAT) sublingual tablet 0.4 mg  0.4 mg Sublingual Q5 Min PRN         OLANZapine zydis (zyPREXA) ODT half-tab 2.5 mg  2.5 mg Oral TID PRN          ondansetron (ZOFRAN-ODT) ODT tab 4 mg  4 mg Oral Q6H PRN        Or     ondansetron (ZOFRAN) injection 4 mg  4 mg Intravenous Q6H PRN         potassium chloride (KLOR-CON) Packet 20-40 mEq  20-40 mEq Oral or Feeding Tube Q2H PRN         potassium chloride 10 mEq in 100 mL intermittent infusion with 10 mg lidocaine  10 mEq Intravenous Q1H PRN         potassium chloride 10 mEq in 100 mL sterile water intermittent infusion (premix)  10 mEq Intravenous Q1H PRN         potassium chloride 20 mEq in 50 mL intermittent infusion  20 mEq Intravenous Q1H PRN 50 mL/hr at 04/22/19 0500 20 mEq at 04/22/19 0500     potassium chloride ER (K-DUR/KLOR-CON M) CR tablet 20-40 mEq  20-40 mEq Oral Q2H PRN         senna-docusate (SENOKOT-S/PERICOLACE) 8.6-50 MG per tablet 1 tablet  1 tablet Oral BID PRN        Or     senna-docusate (SENOKOT-S/PERICOLACE) 8.6-50 MG per tablet 2 tablet  2 tablet Oral BID PRN         sodium chloride (PF) 0.9% PF flush 10 mL  10 mL Intracatheter Q8H   10 mL at 04/23/19 2154     sodium chloride (PF) 0.9% PF flush 10-20 mL  10-20 mL Intracatheter q1 min prn         sodium chloride (PF) 0.9% PF flush 3 mL  3 mL Intracatheter q1 min prn   3 mL at 04/23/19 2030     sodium chloride (PF) 0.9% PF flush 3 mL  3 mL Intracatheter Q8H   3 mL at 04/24/19 0944     sodium phosphate 15 mmol in sodium chloride 0.9 % intermittent infusion  15 mmol Intravenous Daily PRN 62.5 mL/hr at 04/20/19 0556 15 mmol at 04/20/19 0556     sodium phosphate 20 mmol in sodium chloride 0.9 % intermittent infusion  20 mmol Intravenous Q6H PRN         sodium phosphate 25 mmol in sodium chloride 0.9 % intermittent infusion  25 mmol Intravenous Q8H PRN         traMADol (ULTRAM) tablet 50 mg  50 mg Oral Q6H PRN         No current Epic-ordered outpatient medications on file.       Physical Exam   Temp: 97.7  F (36.5  C) Temp src: Axillary BP: 127/51 Pulse: 91 Heart Rate: 95 Resp: 22 SpO2: 98 % O2 Device: Nasal cannula Oxygen Delivery: 2 LPM  Vitals:     04/21/19 0400 04/22/19 0400 04/24/19 0500   Weight: 77.1 kg (169 lb 15.6 oz) 77.3 kg (170 lb 6.7 oz) 83.9 kg (185 lb)     CONSTITUTIONAL: Chronically ill elderly woman seen resting in bed, notably somnolent, family present    Data   Results for orders placed or performed during the hospital encounter of 04/17/19 (from the past 24 hour(s))   Basic metabolic panel   Result Value Ref Range    Sodium 147 (H) 133 - 144 mmol/L    Potassium 3.9 3.4 - 5.3 mmol/L    Chloride 116 (H) 94 - 109 mmol/L    Carbon Dioxide 22 20 - 32 mmol/L    Anion Gap 9 3 - 14 mmol/L    Glucose 194 (H) 70 - 99 mg/dL    Urea Nitrogen 71 (H) 7 - 30 mg/dL    Creatinine 1.80 (H) 0.52 - 1.04 mg/dL    GFR Estimate 24 (L) >60 mL/min/[1.73_m2]    GFR Estimate If Black 28 (L) >60 mL/min/[1.73_m2]    Calcium 7.8 (L) 8.5 - 10.1 mg/dL   Glucose by meter   Result Value Ref Range    Glucose 204 (H) 70 - 99 mg/dL   Lactic acid level STAT for sepsis protocol   Result Value Ref Range    Lactate for Sepsis Protocol 1.8 0.7 - 2.0 mmol/L   Glucose by meter   Result Value Ref Range    Glucose 172 (H) 70 - 99 mg/dL   Glucose by meter   Result Value Ref Range    Glucose 131 (H) 70 - 99 mg/dL   Clostridium difficile toxin B PCR   Result Value Ref Range    Specimen Description Feces     C Diff Toxin B PCR Negative NEG^Negative   XR Chest Port 1 View    Narrative    XR CHEST PORT 1 VW  4/24/2019 6:05 AM     HISTORY:  Sepsis, Infective endocarditis.    COMPARISON: Film performed on 4/20/2019    FINDINGS:  A right jugular venous catheter is noted. The tip is  projected over the superior vena cava. The heart is enlarged.  Bilateral pleural effusions with associated basilar infiltrate or  atelectasis is again noted. The pleural effusions have increased when  compared to the film of 4/20/2019. Right shoulder arthroplasty is  again noted. Severe degenerative change left shoulder joint.      Impression    IMPRESSION: Increase in the size of the bilateral pleural effusions  an  associated basilar infiltrate or atelectasis.    ILEANA SOFIA MD   Magnesium (AM Draw)   Result Value Ref Range    Magnesium 2.7 (H) 1.6 - 2.3 mg/dL   CBC with platelets differential   Result Value Ref Range    WBC 20.8 (H) 4.0 - 11.0 10e9/L    RBC Count 3.02 (L) 3.8 - 5.2 10e12/L    Hemoglobin 9.3 (L) 11.7 - 15.7 g/dL    Hematocrit 25.6 (L) 35.0 - 47.0 %    MCV 85 78 - 100 fl    MCH 30.8 26.5 - 33.0 pg    MCHC 36.3 31.5 - 36.5 g/dL    RDW 15.9 (H) 10.0 - 15.0 %    Platelet Count 158 150 - 450 10e9/L    Diff Method Automated Method     % Neutrophils 88.3 %    % Lymphocytes 5.6 %    % Monocytes 3.1 %    % Eosinophils 0.3 %    % Basophils 0.1 %    % Immature Granulocytes 2.6 %    Nucleated RBCs 0 0 /100    Absolute Neutrophil 18.3 (H) 1.6 - 8.3 10e9/L    Absolute Lymphocytes 1.2 0.8 - 5.3 10e9/L    Absolute Monocytes 0.6 0.0 - 1.3 10e9/L    Absolute Eosinophils 0.1 0.0 - 0.7 10e9/L    Absolute Basophils 0.0 0.0 - 0.2 10e9/L    Abs Immature Granulocytes 0.5 (H) 0 - 0.4 10e9/L    Absolute Nucleated RBC 0.0    Renal panel   Result Value Ref Range    Sodium 149 (H) 133 - 144 mmol/L    Potassium 3.9 3.4 - 5.3 mmol/L    Chloride 115 (H) 94 - 109 mmol/L    Carbon Dioxide 22 20 - 32 mmol/L    Anion Gap 12 3 - 14 mmol/L    Glucose 143 (H) 70 - 99 mg/dL    Urea Nitrogen 83 (H) 7 - 30 mg/dL    Creatinine 2.19 (H) 0.52 - 1.04 mg/dL    GFR Estimate 19 (L) >60 mL/min/[1.73_m2]    GFR Estimate If Black 22 (L) >60 mL/min/[1.73_m2]    Calcium 7.3 (L) 8.5 - 10.1 mg/dL    Phosphorus 5.5 (H) 2.5 - 4.5 mg/dL    Albumin 1.3 (L) 3.4 - 5.0 g/dL   US Renal Complete    Narrative    ULTRASOUND RENAL COMPLETE 4/24/2019 10:14 AM    HISTORY:  89-year-old woman with acute renal failure.    COMPARISON: None. Patient had a CT examination on April 17, 2019.    FINDINGS: The right kidney is 8.3 x 4.9 x 5 cm with AP cortical  thickness of 1.6 cm. The left kidney is 12.2 x 5.7 x 5.6 cm with AP  cortical thickness of 1.8 cm. No hydronephrosis. A large  simple cyst  is identified arising from the inferior aspect of the right kidney  measuring 6.7 x 6.3 x 7.1 cm.      Impression    IMPRESSION:  1. No hydronephrosis of either kidney.  2. Seemingly smaller right kidney than left, though with review of CT  exam, suspect kidneys are more similar in size than measurements  suggest given rotation of right kidney, perhaps related to a large 7.1  cm cyst.    MAURICIO SUBRAMANIAN MD

## 2019-04-24 NOTE — PLAN OF CARE
OT: pt just back from procedure, very lethargic, not able to awaken pt with verbal stimuli from therapy or daughter to participate with OT session at this time    PM: pt still lethargic, per nursing family would like to leave pt sleep, do not wake.

## 2019-04-24 NOTE — PROVIDER NOTIFICATION
"Call placed to : \"Family is still declining CT head at this time.  They would like me not to wake patient for neuro's or VS but want to continue restorative cares.\"    Received return call from .  Nursing to document family's wishes for no CT scan.  "

## 2019-04-24 NOTE — PROVIDER NOTIFICATION
"Web-based page Dr. Simms at 0205: \"[id number] - Can we please get order to test stool for C.diff?Loose stools, elevated WBC and currently on ABX q4h. Thanks, Hiral NORMAN [phone]\"  "

## 2019-04-24 NOTE — PROVIDER NOTIFICATION
"Call placed to Dr. Akins: \"Daughter Paula is wondering if CT head is necessary? Would it change POC at all? If not they are ok with it not being done. Please call if questions, thanks!\"  "

## 2019-04-24 NOTE — PROGRESS NOTES
[facility]    Medicine Progress Note - Hospitalist Service        Date of Admission:  4/17/2019  7:56 AM    Assessment & Plan:     This is an 89-year-old female with past medical history significant for hypertension, TIA, hyperlipidemia and recurrent UTIs initially presented with altered mental status and aphasia.  Her hospitalization was complicated by severe sepsis secondary to a septic left total hip arthroplasty and infective endocarditis.  He was found to have MSSA bacteremia.  ID and orthopedic was consulted and are following.  She is status post left total hip arthroplasty IND on 4/18/2019.  Culture growing a staph aureus from there as well.  She was found to have numerous acute/subacute infarct to her brain which are suspected secondary to septic emboli.  Neurology was consulted as well.  She was found to have A. fib with RVR which is now controlled with amiodarone.  Initially she was given IV amiodaroneto oral amiodarone currently in sinus rhythm.  No anticoagulation at this time given septic emboli and high risk for hemorrhagic conversion.  Her course was complicated with acute hypoxic respiratory failure requiring intubation and mechanical ventilation.  Postoperatively now she is extubated and stable.  Infectious disease following and she is started on IV nafcillin.  Infectious disease, orthopedics, neurology and palliative care consulted and following.         MSSA bacteremia  Septic left total hip arthroplasty status post I&D  Septic shock  Infective endocarditis  -Patient presenting with fever up to 101 at home and worsening left hip pain, concerns for septic left hip  -Blood culture growing staph aureus  -Orthopedic surgery consulted, patient underwent I&D of septic left GURJIT on 4/18  -Blood Culture and left hip culture growing staph aureus, blood culture from 4/19 and 4/20 negative so far.  -high concerns for infective endocarditis given the positive blood culture, septic cerebral  emboli and Janeway lesion  -IV antibiotics now changed to nafcillin, infectious disease following.    Numerous acute/subacute infarcts with suspected septic emboli   Acute encephalopathy   -The patient presents with expressive aphasia, some confusion and possibly left visual field cut.    -CT head was read as 2-3 cm area of hypoattenuation and gray-white differentiation loss involving the mid right postcentral gyrus extending into the right parietal white matter.   -MRI of the brain done on 4/18 reveals numerous scattered infarcts involving the cerebral and cerebellar hemispheres bilaterally and basal ganglia bilaterally consistent with embolic infarcts from a central source   -Neurology following  -Continue aspirin alone for now, as anticoagulation would be high risk for hemorrhagic conversion due to septic emboli  -Echocardiogram with normal LV, no obvious valvular vegetation  -LUTHER was offered, family declined; this likely represents septic emboli given the clinical context.  -CT head 4/20 appears to be stable    Patient on 4/24/2019 more lethargic and confused and less responsive.  I did order the CT scan of the head to be done to rule out any acute bleeding or worsening edema.  Patient family as per the nurses does not want to have the CT scan done.    New onset A. fib with RVR:  -Discontinue amiodarone drip. Start amiodarone 400 mg p.o. daily  -Now converted to normal sinus rhythm  -Continue aspirin alone as anticoagulation would be high risk for CNS bleed given the concern for septic emboli.    palliative care consulted, consider EP evaluation if patient wants to continue restorative care.    Acute hypoxic respiratory failure  -Was intubated postop, successfully extubated (4/19)  -Respiratory status worse 4/20 despite diuretics, chest x-ray consistent with pulmonary edema  -Now off diuretics; continue to monitor ongoing needs for diuretics as needed  -Incentive spirometry as able  -Oxygenation improved,  currently on 2 L    Hyponatremia.   Now resolved  -Sodium at presentation was 129.  The patient reportedly has had problems with low sodium in the recent past.  She was on hydrochlorothiazide and reportedly was stopped just prior to admission.  -Continue to hold hydrochlorothiazide  -Improved, in fact hypernatremic at this time.     Benign essential hypertension.    -prior to admission on amlodipine and losartan   -Sharron-procedure was in shock-most likely a combination of septic and cardiogenic rapid A. Fib  -Blood pressure still have episodes of low blood pressure.  Continue to hold amlodipine and losartan at this time.     Hyperlipidemia.    -Continue Lipitor 40 mg daily when able to take orally.     Dysphagia:  -SLP following, currently on DD-1 diet with honey thickened liquids.    Acute renal failure  This is most likely prerenal, most likely secondary to decreased oral intake and appears of low blood pressures.  Check FENa less than 1%, check urinary eosinophils still pending, most likely is prerenal I did started her on IV fluids 75 mL/h.  But the kidney function get worse even though her blood pressure remained stable I will increase the fluid 200 mL/h at this time.      Need to rule out interstitial nephritis as she is on IV nafcillin, urine eosinophil is still pending.  Creatinine is worsening I will consult nephrology to evaluate the patient today ultrasound of the kidneys to rule out any hydronephrosis    Hypernatremia  Likely secondary to decreased oral intake of water, start on 5% dextrose with 0.45% normal saline at this time.    Sodium is still on the higher side I will increase to 200 mm/h today.    Goals of care:  Palliative care is consulted and following.        Worsening mental status and renal failure.  I will like to have the CT scan repeated today  Consult nephrology  Ultrasound renal to rule out any hydronephrosis  Increase IV fluids to 100 mL/h  Neurology needs to follow.  Last positive  culture culture were 4/19/2019 1 of 2+.  Blood culture on 4/20 and 4/21/2019 are negative so far.  I agree with removing the central line that was placed on  when she still have bacteremia.      Discussed in detail with the patient and family and all questions answered.  Discussed with the nursing staff taking care of the patient.  Discussed the case with infectious disease as well.  They wanted to continue with IV nafcillin as this is the best medication at this time for her high-grade bacteremia and CNS involvement.  Prognosis is guarded         Diet: Combination Diet Dysphagia Diet Level 1: Pureed; Honey Thickened Liquids (pre-thickened or use instant food thickener) (By spoon only)  Snacks/Supplements Adult: Other; Vanilla Magic Shake; With Meals     DVT Prophylaxis: Pneumatic Compression Devices   Aguilar Catheter: in place, indication: Anesthesia  Code Status: DNR/DNI     Disposition Plan    Expected discharge: 4 - 7 days    Entered: Ar Akins MD 04/24/2019, 12:29 PM        The patient's care was discussed with the nursing staff, patient family and Dr. Carrillo of infectious disease today.    Ar Akins MD  Hospitalist Service  [facility]    ______________________________________________________________________    Interval History   She is more lethargic and sleeping at the time of my visit today.  Not able to answer any questions.  Patient family do not want her to wake up.  Not eating very well since yesterday but as per the patient and family she did ate good yesterday.  Unable to take any medication at this morning as she has been too lethargic or sleepy.      Overnight events reviewed with the nursing staff taking care of the patient.        Data reviewed today: I reviewed all medications, new labs and imaging results over the last 24 hours. I personally reviewed no images or EKG's today.    Physical Exam   Vital signs:  Temp: 97.7  F (36.5  C) Temp src: Axillary BP: 127/51 Pulse: 91  "Heart Rate: 95 Resp: 22 SpO2: 98 % O2 Device: Nasal cannula Oxygen Delivery: 2 LPM Height: 172.7 cm (5' 8\") Weight: 83.9 kg (185 lb)  Estimated body mass index is 28.13 kg/m  as calculated from the following:    Height as of this encounter: 1.727 m (5' 8\").    Weight as of this encounter: 83.9 kg (185 lb).      Wt Readings from Last 2 Encounters:   04/24/19 83.9 kg (185 lb)       Gen: Lethargic, sedated.  HEENT:no pallor  Resp: Decreased air entry bilaterally, normal effort of breathing.  CVS: Regular, S1-S2 normal, no murmur on rub  Abd/GI: Soft, non-tender. BS- normoactive.   Skin: Warm, dry no rashes  MSK: Left lower extremity edema 1+.  Neuro-  have left-sided weakness.    Data   Recent Labs   Lab 04/24/19  0645 04/23/19  1515 04/23/19  0725 04/22/19  0400  04/17/19  1818   WBC 20.8*  --  22.3* 20.0*   < >  --    HGB 9.3*  --  10.3* 10.1*   < >  --    MCV 85  --  85 85   < >  --      --  145* 115*   < >  --    * 147* 148* 142   < >  --    POTASSIUM 3.9 3.9 3.9 3.7   < >  --    CHLORIDE 115* 116* 116* 109   < >  --    CO2 22 22 24 27   < >  --    BUN 83* 71* 64* 39*   < >  --    CR 2.19* 1.80* 1.53* 0.79   < >  --    ANIONGAP 12 9 8 6   < >  --    LULU 7.3* 7.8* 8.0* 8.1*   < >  --    * 194* 120* 133*   < >  --    ALBUMIN 1.3*  --   --   --   --  2.5*   PROTTOTAL  --   --   --   --   --  6.4*   BILITOTAL  --   --   --   --   --  0.8   ALKPHOS  --   --   --   --   --  122   ALT  --   --   --   --   --  29   AST  --   --   --   --   --  26    < > = values in this interval not displayed.       Recent Results (from the past 24 hour(s))   XR Chest Port 1 View    Narrative    XR CHEST PORT 1 VW  4/24/2019 6:05 AM     HISTORY:  Sepsis, Infective endocarditis.    COMPARISON: Film performed on 4/20/2019    FINDINGS:  A right jugular venous catheter is noted. The tip is  projected over the superior vena cava. The heart is enlarged.  Bilateral pleural effusions with associated basilar infiltrate " or  atelectasis is again noted. The pleural effusions have increased when  compared to the film of 4/20/2019. Right shoulder arthroplasty is  again noted. Severe degenerative change left shoulder joint.      Impression    IMPRESSION: Increase in the size of the bilateral pleural effusions an  associated basilar infiltrate or atelectasis.    ILEANA SOFIA MD   US Renal Complete    Narrative    ULTRASOUND RENAL COMPLETE 4/24/2019 10:14 AM    HISTORY:  89-year-old woman with acute renal failure.    COMPARISON: None. Patient had a CT examination on April 17, 2019.    FINDINGS: The right kidney is 8.3 x 4.9 x 5 cm with AP cortical  thickness of 1.6 cm. The left kidney is 12.2 x 5.7 x 5.6 cm with AP  cortical thickness of 1.8 cm. No hydronephrosis. A large simple cyst  is identified arising from the inferior aspect of the right kidney  measuring 6.7 x 6.3 x 7.1 cm.      Impression    IMPRESSION:  1. No hydronephrosis of either kidney.  2. Seemingly smaller right kidney than left, though with review of CT  exam, suspect kidneys are more similar in size than measurements  suggest given rotation of right kidney, perhaps related to a large 7.1  cm cyst.    MAURICIO SUBRAMANIAN MD     Medications     dextrose 5% and 0.45% NaCl 100 mL/hr at 04/24/19 1103     - MEDICATION INSTRUCTIONS -         albumin human  25 g Intravenous Once     amiodarone  400 mg Oral Daily     aspirin  325 mg Oral Daily    Or     aspirin  300 mg Rectal Daily     atorvastatin  10 mg Oral or NG Tube Daily at 8 pm     insulin aspart  1-7 Units Subcutaneous TID AC     insulin aspart  1-5 Units Subcutaneous At Bedtime     nafcillin  2 g Intravenous Q4H     sodium chloride (PF)  10 mL Intracatheter Q8H     sodium chloride (PF)  3 mL Intracatheter Q8H

## 2019-04-24 NOTE — CONSULTS
[facility]    Nephrology Consultation     Date of Admission:  4/17/2019    Assessment & Plan      Belgica Klein is a 89 year old female who was admitted on 4/17/2019.     1) Baseline Normal Kidney Function    2) MSSA Bacteremia    3) Presumed Infective Endocarditis.    4) WENDI:  Many possible causes.  She does appear dry.  Na up to 149.  BUN/cr ratio 38:1.  FENA 0.7%.  Endocarditis/bacteremia can cause WENDI in several ways: ATN, septic emboli, acute GN.  Nafcillin can cause allergic interstitial nephritis.  Normally it takes more than 3 days to cause this, but the timing of WENDI corresponds to timing of Nafcillin use.  The contrast load is too remote to explain WENDI.    Suggest:    UA c micro  Continue Nafcillin for now.  Agree with hydration.   I hope that her renal indices will improve with IV hydration.    If not she may need an alternative antibiotic.          Chao Ayala MD  [facility] - Nephrology  [phone]    Reason for Consult      I was asked to see the patient for WENDI.      Primary Care Physician      Physician No Ref-Primary    Chief Complaint      WENDI    History is obtained from chart review.      History of Present Illness      Belgica Klein is a 89 year old female who presents with WENDI.    She was admitted on 4/17/19 via ED after presenting with altered mental status and aphasia.    She has been found to have:  CVIs in several distributions suggesting emboli.  Infected L GURJIT.  MSSA bacteremia  Suspected infectious endocarditis    Her baseline kidney function is normal. Cr/gfr 0.7/76.    She developed WENDI beginning on 4/23 which has progressed today to cr/gfr of 2.19/19.  Her urine output is reduced from 2-3 L per day to 600 mL yesterday.    She did begin Nafcillin 2 grams IV Q4H on 4/21/19.      She has CTA of Head and Neck on 4/17/19 with 70 mL contrast.    She had vancomycin 1500 mg x 1 dose 4/17/19.    No NSAIDs.    Renal US shows no explanation for WENDI.      Past  Medical History   I have reviewed this patient's medical history and updated it with pertinent information if needed.   HTN  Hyponatremia  Hypercholesterolemia    Past Surgical History   I have reviewed this patient's surgical history and updated it with pertinent information if needed.  Past Surgical History:   Procedure Laterality Date     IRRIGATION AND DEBRIDEMENT HIP, COMBINED Left 4/18/2019    Procedure: COMBINED IRRIGATION AND DEBRIDEMENT LEFT HIP.;  Surgeon: Chalino Zhang MD;  Location:  OR       Prior to Admission Medications   Prior to Admission Medications   Prescriptions Last Dose Informant Patient Reported? Taking?   amLODIPine (NORVASC) 5 MG tablet 4/14/2019 at am Pharmacy Yes Yes   Sig: Take 5 mg by mouth daily   aspirin 81 MG EC tablet 4/14/2019 at am Daughter Yes Yes   Sig: Take 81 mg by mouth daily   atorvastatin (LIPITOR) 10 MG tablet 4/14/2019 at am Pharmacy Yes Yes   Sig: Take 10 mg by mouth daily   hydrochlorothiazide (HYDRODIURIL) 12.5 MG tablet 4/14/2019 at am Pharmacy Yes Yes   Sig: Take 12.5 mg by mouth daily   losartan (COZAAR) 100 MG tablet 4/14/2019 at am Pharmacy Yes Yes   Sig: Take 100 mg by mouth daily      Facility-Administered Medications: None     Allergies   Allergies   Allergen Reactions     Penicillins Hives       Social History   I have reviewed this patient's social history and updated it with pertinent information if needed.  Former smoker.  Occ glass of wine      Family History   I have reviewed this patient's family history and updated it with pertinent information if needed.   No family history on file.    Review of Systems   The 10 point Review of Systems is negative other than noted in the HPI.      Physical Exam   Temp: 98  F (36.7  C) Temp src: Axillary BP: 116/48   Heart Rate: 97 Resp: 20 SpO2: 100 % O2 Device: Nasal cannula Oxygen Delivery: 2 LPM  Vital Signs with Ranges  Temp:  [97.7  F (36.5  C)-98.8  F (37.1  C)] 98  F (36.7  C)  Heart Rate:  [90-98]  97  Resp:  [16-26] 20  BP: (110-130)/(48-60) 116/48  SpO2:  [95 %-100 %] 100 %  185 lbs 0 oz    GENERAL: unconscious  HEENT:  Normocephalic. No gross abnormalities.  MM dry.  CV: RRR, no murmurs, no clicks, gallops, or rubs, tr BLE edema, no carotid bruits  RESP: shallow breathing  GI: Abdomen soft/nt/nd, BS normal. No masses, organomegaly  MUSCULOSKELETAL: extremities nl - no gross deformities noted  SKIN: Both Osler's Nodes and Janeway Lesions are present on hands.    NEURO:  Responds to pain when I examined her hands.    PSYCH: unable.    LYMPH: No palpable ant/post cervical and supraclavicular adenopathy    Data   BMP  Recent Labs   Lab 04/24/19  0645 04/23/19  1515 04/23/19  0725 04/22/19  0400   * 147* 148* 142   POTASSIUM 3.9 3.9 3.9 3.7   CHLORIDE 115* 116* 116* 109   LULU 7.3* 7.8* 8.0* 8.1*   CO2 22 22 24 27   BUN 83* 71* 64* 39*   CR 2.19* 1.80* 1.53* 0.79   * 194* 120* 133*     Phos@LABRCNTIPR(phos:4)  CBC)  Recent Labs   Lab 04/24/19  0645 04/23/19  0725 04/22/19  0400 04/21/19  0410   WBC 20.8* 22.3* 20.0* 17.9*   HGB 9.3* 10.3* 10.1* 10.1*   HCT 25.6* 28.4* 27.7* 28.1*   MCV 85 85 85 85    145* 115* 108*     Recent Labs   Lab 04/17/19  1818   AST 26   ALT 29   ALKPHOS 122   BILITOTAL 0.8     No lab results found in last 7 days.  No results found for: D2VIT, D3VIT, DTOT  Recent Labs   Lab 04/24/19  0645   HGB 9.3*   HCT 25.6*   MCV 85     No results for input(s): PTHI in the last 168 hours.

## 2019-04-24 NOTE — PROVIDER NOTIFICATION
"Call placed to Dr. Akins: \"FYI: Unable to given patient AM meds, too lethargic for po intake.\"  "

## 2019-04-24 NOTE — PROGRESS NOTES
"CLINICAL NUTRITION SERVICES  -  ASSESSMENT NOTE      Recommendations Ordered by Registered Dietitian (RD):   Medical Food Supplement: Gelatein Plus at 10am and 2pm   Malnutrition:   % Weight Loss:  None noted  % Intake:  Decreased intake does not meet criteria for malnutrition   Subcutaneous Fat Loss:  Unable to assess  Muscle Loss: Unable to assess  Fluid Retention:  None noted    Malnutrition Diagnosis: Unable to determine due to lack of nutrition focused physical exam         REASON FOR ASSESSMENT  Belgica Klein is a 89 year old female seen by Registered Dietitian for [facility]      NUTRITION HISTORY  - Information obtained from family (daughter and family)  -Pt lives on her own (resides in [facility]), does her own cooking and grocery shopping.     CURRENT NUTRITION ORDERS  Diet Order:     Dysphagia Diet level 1 + Honey Thickened liquids        Current Intake/Tolerance:  -Per family pt has an okay appetite, great appetite yesterday but a little lower today. Daughter reports it is close to baseline   -Per RN notes- BM x 4 overnight - soft/loose - C. Diff pending       NUTRITION FOCUSED PHYSICAL ASSESSMENT (NFPA)  Completed:  No, pt resting after procedure, covers up to neck          Observed:    See above    Obtained from Chart/Interdisciplinary Team:  None  -Per MD note: mild abdominal distention     ANTHROPOMETRICS  Height: 5' 8\"  Weight: 185 lbs 0 oz  Body mass index is 28.13 kg/m .  Weight Status:  Overweight BMI 25-29.9  IBW: 63.6 kg   % IBW: 132%  Weight History: limited wt on file   Wt Readings from Last 10 Encounters:   04/24/19 83.9 kg (185 lb)       LABS  Labs reviewed    MEDICATIONS  Medications reviewed      ASSESSED NUTRITION NEEDS PER APPROVED PRACTICE GUIDELINES:    Dosing Weight 70.8 kg (lowest wt this admission)   Estimated Energy Needs: 5500-1452 kcals (25-30 Kcal/Kg)  Justification: maintenance  Estimated Protein Needs:  grams protein (1.2-1.5 g pro/Kg)  Justification: preservation of lean " body mass  Estimated Fluid Needs: 0822-9356 mL (1 mL/Kcal)  Justification: maintenance    MALNUTRITION:  % Weight Loss:  None noted  % Intake:  Decreased intake does not meet criteria for malnutrition   Subcutaneous Fat Loss:  Unable to assess  Muscle Loss: Unable to assess  Fluid Retention:  None noted    Malnutrition Diagnosis: Unable to determine due to lack of nutrition focused physical exam       NUTRITION DIAGNOSIS:  Predicted inadequate nutrient (protein - energy) intake related to difficulty swallowing (dysphagia diet level one + nectar thickened liquids) and prolonged hospital stay       NUTRITION INTERVENTIONS  Recommendations / Nutrition Prescription  Diet per MD and SLP  Oral nutrition supplements at with meals and between   .      Implementation  Nutrition education: Provided education on protein and oral nutrition supplements   Medical Food Supplement: Gelatein Plus at 10am and 2pm  .      Nutrition Goals  Pt to consume at least 75% of meals ordered and 50% of oral nutrition supplements TID  .      MONITORING AND EVALUATION:  Progress towards goals will be monitored and evaluated per protocol and Practice Guidelines        Norma Wong RD, LD

## 2019-04-25 LAB
ALBUMIN SERPL-MCNC: 1.7 G/DL (ref 3.4–5)
ANION GAP SERPL CALCULATED.3IONS-SCNC: 12 MMOL/L (ref 3–14)
BACTERIA SPEC CULT: ABNORMAL
BACTERIA SPEC CULT: NO GROWTH
BASOPHILS # BLD AUTO: 0 10E9/L (ref 0–0.2)
BASOPHILS NFR BLD AUTO: 0.1 %
BUN SERPL-MCNC: 92 MG/DL (ref 7–30)
CALCIUM SERPL-MCNC: 7.6 MG/DL (ref 8.5–10.1)
CHLORIDE SERPL-SCNC: 118 MMOL/L (ref 94–109)
CO2 SERPL-SCNC: 19 MMOL/L (ref 20–32)
CREAT SERPL-MCNC: 2.5 MG/DL (ref 0.52–1.04)
DIFFERENTIAL METHOD BLD: ABNORMAL
EOSINOPHIL # BLD AUTO: 0.1 10E9/L (ref 0–0.7)
EOSINOPHIL NFR BLD AUTO: 0.7 %
ERYTHROCYTE [DISTWIDTH] IN BLOOD BY AUTOMATED COUNT: 16.2 % (ref 10–15)
GFR SERPL CREATININE-BSD FRML MDRD: 16 ML/MIN/{1.73_M2}
GLUCOSE BLDC GLUCOMTR-MCNC: 115 MG/DL (ref 70–99)
GLUCOSE SERPL-MCNC: 130 MG/DL (ref 70–99)
HCT VFR BLD AUTO: 25.5 % (ref 35–47)
HGB BLD-MCNC: 9.2 G/DL (ref 11.7–15.7)
IMM GRANULOCYTES # BLD: 0.3 10E9/L (ref 0–0.4)
IMM GRANULOCYTES NFR BLD: 1.9 %
LYMPHOCYTES # BLD AUTO: 1 10E9/L (ref 0.8–5.3)
LYMPHOCYTES NFR BLD AUTO: 5.2 %
Lab: ABNORMAL
Lab: NORMAL
MAGNESIUM SERPL-MCNC: 2.7 MG/DL (ref 1.6–2.3)
MCH RBC QN AUTO: 30.6 PG (ref 26.5–33)
MCHC RBC AUTO-ENTMCNC: 36.1 G/DL (ref 31.5–36.5)
MCV RBC AUTO: 85 FL (ref 78–100)
MONOCYTES # BLD AUTO: 0.5 10E9/L (ref 0–1.3)
MONOCYTES NFR BLD AUTO: 2.5 %
NEUTROPHILS # BLD AUTO: 16.5 10E9/L (ref 1.6–8.3)
NEUTROPHILS NFR BLD AUTO: 89.6 %
NRBC # BLD AUTO: 0 10*3/UL
NRBC BLD AUTO-RTO: 0 /100
PHOSPHATE SERPL-MCNC: 6.7 MG/DL (ref 2.5–4.5)
PLATELET # BLD AUTO: 179 10E9/L (ref 150–450)
POTASSIUM SERPL-SCNC: 3.7 MMOL/L (ref 3.4–5.3)
RBC # BLD AUTO: 3.01 10E12/L (ref 3.8–5.2)
SODIUM SERPL-SCNC: 149 MMOL/L (ref 133–144)
SPECIMEN SOURCE: ABNORMAL
SPECIMEN SOURCE: NORMAL
WBC # BLD AUTO: 18.4 10E9/L (ref 4–11)

## 2019-04-25 PROCEDURE — 25000125 ZZHC RX 250: Performed by: INTERNAL MEDICINE

## 2019-04-25 PROCEDURE — 25000128 H RX IP 250 OP 636: Performed by: NURSE PRACTITIONER

## 2019-04-25 PROCEDURE — 85025 COMPLETE CBC W/AUTO DIFF WBC: CPT | Performed by: INTERNAL MEDICINE

## 2019-04-25 PROCEDURE — 00000146 ZZHCL STATISTIC GLUCOSE BY METER IP

## 2019-04-25 PROCEDURE — 25800025 ZZH RX 258: Performed by: INTERNAL MEDICINE

## 2019-04-25 PROCEDURE — 80069 RENAL FUNCTION PANEL: CPT | Performed by: INTERNAL MEDICINE

## 2019-04-25 PROCEDURE — 12000000 ZZH R&B MED SURG/OB

## 2019-04-25 PROCEDURE — 83735 ASSAY OF MAGNESIUM: CPT | Performed by: INTERNAL MEDICINE

## 2019-04-25 PROCEDURE — 36415 COLL VENOUS BLD VENIPUNCTURE: CPT | Performed by: INTERNAL MEDICINE

## 2019-04-25 PROCEDURE — 25000132 ZZH RX MED GY IP 250 OP 250 PS 637: Performed by: INTERNAL MEDICINE

## 2019-04-25 PROCEDURE — 25000128 H RX IP 250 OP 636: Performed by: INTERNAL MEDICINE

## 2019-04-25 PROCEDURE — A9270 NON-COVERED ITEM OR SERVICE: HCPCS | Performed by: INTERNAL MEDICINE

## 2019-04-25 PROCEDURE — 86160 COMPLEMENT ANTIGEN: CPT | Performed by: INTERNAL MEDICINE

## 2019-04-25 PROCEDURE — 99233 SBSQ HOSP IP/OBS HIGH 50: CPT | Performed by: INTERNAL MEDICINE

## 2019-04-25 RX ORDER — ACETAMINOPHEN 325 MG/1
650 TABLET ORAL EVERY 6 HOURS PRN
Status: DISCONTINUED | OUTPATIENT
Start: 2019-04-25 | End: 2019-04-30 | Stop reason: HOSPADM

## 2019-04-25 RX ORDER — HYDROMORPHONE HYDROCHLORIDE 10 MG/ML
1-2 INJECTION INTRAMUSCULAR; INTRAVENOUS; SUBCUTANEOUS
Status: DISCONTINUED | OUTPATIENT
Start: 2019-04-25 | End: 2019-04-26

## 2019-04-25 RX ORDER — HALOPERIDOL 5 MG/ML
.5-1 INJECTION INTRAMUSCULAR
Status: DISCONTINUED | OUTPATIENT
Start: 2019-04-25 | End: 2019-04-30 | Stop reason: HOSPADM

## 2019-04-25 RX ORDER — LORAZEPAM 0.5 MG/1
.5-1 TABLET ORAL
Status: DISCONTINUED | OUTPATIENT
Start: 2019-04-25 | End: 2019-04-30 | Stop reason: HOSPADM

## 2019-04-25 RX ORDER — ATROPINE SULFATE 10 MG/ML
1-2 SOLUTION/ DROPS OPHTHALMIC
Status: DISCONTINUED | OUTPATIENT
Start: 2019-04-25 | End: 2019-04-30 | Stop reason: HOSPADM

## 2019-04-25 RX ORDER — METOCLOPRAMIDE 5 MG/1
5 TABLET ORAL EVERY 6 HOURS PRN
Status: DISCONTINUED | OUTPATIENT
Start: 2019-04-25 | End: 2019-04-30 | Stop reason: HOSPADM

## 2019-04-25 RX ORDER — MORPHINE SULFATE 100 MG/5ML
5-10 SOLUTION ORAL
Status: DISCONTINUED | OUTPATIENT
Start: 2019-04-25 | End: 2019-04-25

## 2019-04-25 RX ORDER — MORPHINE SULFATE 2 MG/ML
1-2 INJECTION, SOLUTION INTRAMUSCULAR; INTRAVENOUS
Status: DISCONTINUED | OUTPATIENT
Start: 2019-04-25 | End: 2019-04-25

## 2019-04-25 RX ORDER — ACETAMINOPHEN 650 MG/1
650 SUPPOSITORY RECTAL EVERY 6 HOURS PRN
Status: DISCONTINUED | OUTPATIENT
Start: 2019-04-25 | End: 2019-04-30 | Stop reason: HOSPADM

## 2019-04-25 RX ORDER — ONDANSETRON 4 MG/1
4 TABLET, ORALLY DISINTEGRATING ORAL EVERY 6 HOURS PRN
Status: DISCONTINUED | OUTPATIENT
Start: 2019-04-25 | End: 2019-04-30 | Stop reason: HOSPADM

## 2019-04-25 RX ORDER — GLYCOPYRROLATE 1 MG/1
2-4 TABLET ORAL EVERY 4 HOURS PRN
Status: DISCONTINUED | OUTPATIENT
Start: 2019-04-25 | End: 2019-04-30 | Stop reason: HOSPADM

## 2019-04-25 RX ORDER — GLYCOPYRROLATE 0.2 MG/ML
.2-.4 INJECTION, SOLUTION INTRAMUSCULAR; INTRAVENOUS EVERY 4 HOURS PRN
Status: DISCONTINUED | OUTPATIENT
Start: 2019-04-25 | End: 2019-04-30 | Stop reason: HOSPADM

## 2019-04-25 RX ORDER — PROCHLORPERAZINE MALEATE 5 MG
5 TABLET ORAL EVERY 6 HOURS PRN
Status: DISCONTINUED | OUTPATIENT
Start: 2019-04-25 | End: 2019-04-30 | Stop reason: HOSPADM

## 2019-04-25 RX ORDER — PROCHLORPERAZINE 25 MG
12.5 SUPPOSITORY, RECTAL RECTAL EVERY 12 HOURS PRN
Status: DISCONTINUED | OUTPATIENT
Start: 2019-04-25 | End: 2019-04-30 | Stop reason: HOSPADM

## 2019-04-25 RX ORDER — MORPHINE SULFATE 10 MG/5ML
5-10 SOLUTION ORAL
Status: DISCONTINUED | OUTPATIENT
Start: 2019-04-25 | End: 2019-04-25

## 2019-04-25 RX ORDER — METOCLOPRAMIDE HYDROCHLORIDE 5 MG/ML
5 INJECTION INTRAMUSCULAR; INTRAVENOUS EVERY 6 HOURS PRN
Status: DISCONTINUED | OUTPATIENT
Start: 2019-04-25 | End: 2019-04-30 | Stop reason: HOSPADM

## 2019-04-25 RX ORDER — HYDROMORPHONE HYDROCHLORIDE 1 MG/ML
.3-.5 INJECTION, SOLUTION INTRAMUSCULAR; INTRAVENOUS; SUBCUTANEOUS
Status: DISCONTINUED | OUTPATIENT
Start: 2019-04-25 | End: 2019-04-26

## 2019-04-25 RX ORDER — BISACODYL 10 MG
10 SUPPOSITORY, RECTAL RECTAL
Status: DISCONTINUED | OUTPATIENT
Start: 2019-04-28 | End: 2019-04-30 | Stop reason: HOSPADM

## 2019-04-25 RX ORDER — LORAZEPAM 2 MG/ML
.5-1 INJECTION INTRAMUSCULAR
Status: DISCONTINUED | OUTPATIENT
Start: 2019-04-25 | End: 2019-04-30 | Stop reason: HOSPADM

## 2019-04-25 RX ORDER — ONDANSETRON 2 MG/ML
4 INJECTION INTRAMUSCULAR; INTRAVENOUS EVERY 6 HOURS PRN
Status: DISCONTINUED | OUTPATIENT
Start: 2019-04-25 | End: 2019-04-30 | Stop reason: HOSPADM

## 2019-04-25 RX ADMIN — HYDROMORPHONE HYDROCHLORIDE 0.3 MG: 1 INJECTION, SOLUTION INTRAMUSCULAR; INTRAVENOUS; SUBCUTANEOUS at 19:18

## 2019-04-25 RX ADMIN — NAFCILLIN SODIUM 2 G: 2 INJECTION, POWDER, LYOPHILIZED, FOR SOLUTION INTRAMUSCULAR; INTRAVENOUS at 00:31

## 2019-04-25 RX ADMIN — NAFCILLIN SODIUM 2 G: 2 INJECTION, POWDER, LYOPHILIZED, FOR SOLUTION INTRAMUSCULAR; INTRAVENOUS at 10:31

## 2019-04-25 RX ADMIN — ASPIRIN 300 MG: 300 SUPPOSITORY RECTAL at 10:34

## 2019-04-25 RX ADMIN — HYDROMORPHONE HYDROCHLORIDE 0.5 MG: 1 INJECTION, SOLUTION INTRAMUSCULAR; INTRAVENOUS; SUBCUTANEOUS at 22:01

## 2019-04-25 RX ADMIN — DEXTROSE AND SODIUM CHLORIDE: 5; 450 INJECTION, SOLUTION INTRAVENOUS at 08:38

## 2019-04-25 RX ADMIN — HYDROMORPHONE HYDROCHLORIDE 0.3 MG: 1 INJECTION, SOLUTION INTRAMUSCULAR; INTRAVENOUS; SUBCUTANEOUS at 10:41

## 2019-04-25 RX ADMIN — HYDROMORPHONE HYDROCHLORIDE 0.3 MG: 1 INJECTION, SOLUTION INTRAMUSCULAR; INTRAVENOUS; SUBCUTANEOUS at 00:31

## 2019-04-25 RX ADMIN — HYDROMORPHONE HYDROCHLORIDE 0.3 MG: 1 INJECTION, SOLUTION INTRAMUSCULAR; INTRAVENOUS; SUBCUTANEOUS at 06:11

## 2019-04-25 RX ADMIN — NAFCILLIN SODIUM 2 G: 2 INJECTION, POWDER, LYOPHILIZED, FOR SOLUTION INTRAMUSCULAR; INTRAVENOUS at 06:06

## 2019-04-25 ASSESSMENT — ACTIVITIES OF DAILY LIVING (ADL)
ADLS_ACUITY_SCORE: 16
ADLS_ACUITY_SCORE: 20
ADLS_ACUITY_SCORE: 16
ADLS_ACUITY_SCORE: 16

## 2019-04-25 NOTE — PLAN OF CARE
Physical Therapy Discharge Summary    Reason for therapy discharge:    Patient/family request discontinuation of services.    Progress towards therapy goal(s). See goals on Care Plan in [facility] electronic health record for goal details.  Goals not met.  Barriers to achieving goals:   limited tolerance for therapy and family requesting discontinuation of IP therapy services .    Therapy recommendation(s):    No further therapy is recommended.

## 2019-04-25 NOTE — PLAN OF CARE
"Speech Language Therapy Discharge Summary    Reason for therapy discharge:    Patient/family request discontinuation of services.    Progress towards therapy goal(s). See goals on Care Plan in [facility] electronic health record for goal details.  Goals not met.  Barriers to achieving goals:   PT notified SLP that family would like to discontinue all therapies .    Therapy recommendation(s):    No further therapy is recommended.  Per last SLP note, \"Continue dysphagia diet 1 and honey thick liquids by spoon or single straw sip. Feed only when fully alert, upright, small bites, and alternate liquids/solids. Hold diet if increased coughing noted or vocal changes. Nursing to ensure adequate thickness from items coming from the kitchen \"       "

## 2019-04-25 NOTE — PROGRESS NOTES
Orthopedic Surgery  Belgicatoby Maddenn  2019  Admit Date:  2019  POD 7 Days Post-Op  S/P Procedure(s):  COMBINED IRRIGATION AND DEBRIDEMENT LEFT HIP.    Patient somnolent and sleeping on my arrival  Discussed with daughters that they have moved forward with decision of comfort care    Vital Sign Ranges  Temperature Temp  Av.9  F (36.6  C)  Min: 97.7  F (36.5  C)  Max: 98  F (36.7  C)   Blood pressure Systolic (24hrs), Av , Min:116 , Max:142        Diastolic (24hrs), Av, Min:48, Max:63      Pulse No data recorded   Respirations Resp  Av.7  Min: 16  Max: 20   Pulse oximetry SpO2  Av.2 %  Min: 98 %  Max: 100 %       Deferred exam    Labs:  Recent Labs   Lab Test 19  0758 19  0645 19  1515   POTASSIUM 3.7 3.9 3.9     Recent Labs   Lab Test 19  0758 19  0645 19  0725   HGB 9.2* 9.3* 10.3*     Recent Labs   Lab Test 19  0805   INR 1.20*     Recent Labs   Lab Test 19  0758 19  0645 19  0725    158 145*       Ortho signing off  Aquacel should be removed POD #14, staples removed between POD #14 and #21  Please call with any orthopedic questions or concerns that may arise    Shyla Suggs PA-C

## 2019-04-25 NOTE — PLAN OF CARE
OT: pt lethargic, family declining for pt and requested to discontinue all therapies. Nursing and MD informed of family decision to discontinue therapies.    Will discharge per family request, see discharge summary, GOALS NOT MET,

## 2019-04-25 NOTE — PROGRESS NOTES
[facility]    Medicine Progress Note - Hospitalist Service        Date of Admission:  4/17/2019  7:56 AM    Assessment & Plan:     This is an 89-year-old female with past medical history significant for hypertension, TIA, hyperlipidemia and recurrent UTIs initially presented with altered mental status and aphasia.  Her hospitalization was complicated by severe sepsis secondary to a septic left total hip arthroplasty and infective endocarditis.  He was found to have MSSA bacteremia.  ID and orthopedic was consulted and are following.  She is status post left total hip arthroplasty IND on 4/18/2019.  Culture growing a staph aureus from there as well.  She was found to have numerous acute/subacute infarct to her brain which are suspected secondary to septic emboli.  Neurology was consulted as well.  She was found to have A. fib with RVR which is now controlled with amiodarone.  Initially she was given IV amiodaroneto oral amiodarone currently in sinus rhythm.  No anticoagulation at this time given septic emboli and high risk for hemorrhagic conversion.  Her course was complicated with acute hypoxic respiratory failure requiring intubation and mechanical ventilation.  Postoperatively now she is extubated and stable.  Infectious disease following and she is started on IV nafcillin.  Infectious disease, orthopedics, neurology and palliative care consulted and following.    Comfort care now  Patient family decided to keep the patient comfort care only.  They do not want any IV antibiotics on lab work at this time.  I will discontinue IV nafcillin, aspirin, Lipitor and IV fluids at this time.  Started on comfort care protocol.  As per patient and family wishes.  Bedside nurse did confirm with the family about the comfort care measures only.     MSSA bacteremia  Septic left total hip arthroplasty status post I&D  Septic shock  Infective endocarditis  -Patient presenting with fever up to 101 at home and  worsening left hip pain, concerns for septic left hip  -Blood culture growing staph aureus  -Orthopedic surgery consulted, patient underwent I&D of septic left GURJIT on 4/18  -Blood Culture and left hip culture growing staph aureus, blood culture from 4/19 and 4/20 negative so far.  -high concerns for infective endocarditis given the positive blood culture, septic cerebral emboli and Janeway lesion  -IV antibiotics now changed to nafcillin, infectious disease following.  Now the patient is comfort care only and IV antibiotics are discontinued.    Numerous acute/subacute infarcts with suspected septic emboli   Acute encephalopathy   -The patient presents with expressive aphasia, some confusion and possibly left visual field cut.    -CT head was read as 2-3 cm area of hypoattenuation and gray-white differentiation loss involving the mid right postcentral gyrus extending into the right parietal white matter.   -MRI of the brain done on 4/18 reveals numerous scattered infarcts involving the cerebral and cerebellar hemispheres bilaterally and basal ganglia bilaterally consistent with embolic infarcts from a central source   -Neurology following  -Continue aspirin alone for now, as anticoagulation would be high risk for hemorrhagic conversion due to septic emboli  -Echocardiogram with normal LV, no obvious valvular vegetation  -LUTHER was offered, family declined; this likely represents septic emboli given the clinical context.  -CT head 4/20 appears to be stable    Patient on 4/24/2019 more lethargic and confused and less responsive.  I did order the CT scan of the head to be done to rule out any acute bleeding or worsening edema.  Patient family as per the nurses does not want to have the CT scan done.  She is now comfort care only.    New onset A. fib with RVR:  -Discontinue amiodarone drip. Start amiodarone 400 mg p.o. daily  -Now converted to normal sinus rhythm  -Continue aspirin alone as anticoagulation would be high  risk for CNS bleed given the concern for septic emboli.    palliative care consulted, consider EP evaluation if patient wants to continue restorative care.    Acute hypoxic respiratory failure  -Was intubated postop, successfully extubated (4/19)  -Respiratory status worse 4/20 despite diuretics, chest x-ray consistent with pulmonary edema  -Now off diuretics; continue to monitor ongoing needs for diuretics as needed  -Incentive spirometry as able  -Oxygenation improved, currently on 2 L    Hyponatremia.   Now resolved  -Sodium at presentation was 129.  The patient reportedly has had problems with low sodium in the recent past.  She was on hydrochlorothiazide and reportedly was stopped just prior to admission.  -Continue to hold hydrochlorothiazide  -Improved, in fact hypernatremic at this time.     Benign essential hypertension.    -prior to admission on amlodipine and losartan   -Sharron-procedure was in shock-most likely a combination of septic and cardiogenic rapid A. Fib  -Blood pressure still have episodes of low blood pressure.  Continue to hold amlodipine and losartan at this time.     Hyperlipidemia.    -Continue Lipitor 40 mg daily when able to take orally.     Dysphagia:  -SLP following, currently on DD-1 diet with honey thickened liquids.    Acute renal failure  This is most likely prerenal, most likely secondary to decreased oral intake and appears of low blood pressures.  Check FENa less than 1%, check urinary eosinophils still pending, most likely is prerenal I did started her on IV fluids 75 mL/h.  But the kidney function get worse even though her blood pressure remained stable I will increase the fluid 100 mL/h at this time.      Need to rule out interstitial nephritis as she is on IV nafcillin, urine eosinophil is still pending.  Creatinine is worsening I will consult nephrology to evaluate the patient  ultrasound of the kidneys to rule out any hydronephrosis    Patient is comfort care  only.    Hypernatremia  Likely secondary to decreased oral intake of water, start on 5% dextrose with 0.45% normal saline at this time.    Sodium is still on the higher side I will increase to 200 mm/h today.    Goals of care:  Palliative care is consulted and following.        Worsening mental status and renal failure.    Patient family wants only comfort care measures at this time.      Discussed in detail with the patient and family and all questions answered.  Discussed with the nursing staff taking care of the patient.    Prognosis is guarded          Diet: Combination Diet Dysphagia Diet Level 1: Pureed; Honey Thickened Liquids (pre-thickened or use instant food thickener) (By spoon only)  Snacks/Supplements Adult: Other; Vanilla Magic Shake; With Meals  Snacks/Supplements Adult: Gelatein Plus; Between Meals     DVT Prophylaxis: Pneumatic Compression Devices   Aguilar Catheter: in place, indication: Anesthesia  Code Status: Special Code     Disposition Plan    Expected discharge: 2 - 3 days    Entered: Ar Akins MD 04/25/2019, 1:05 PM        The patient's care was discussed with the nursing staff, patient family and Dr. Carrillo of infectious disease today.    Ar Akins MD  Hospitalist Service  [facility]    ______________________________________________________________________    Interval History   Patient remains lethargic, confused with minimally responsive.  Patient family does not want any rehab.  I had a family conference with the patient daughters including Velma who is POA.  Patient family does not want any aggressive treatment anymore.  They do not want anyone to poker for the labs.  They wanted to be comfortable as well.    I would long discussion regarding aggressive treatment versus comfort care.  And the aim of comfort case was just keep her comfortable.  Stopping the antibiotics and other medication to keep her on the medication that we will keep her pain-free and comfortable.   "And help her in the breathing.  This is exactly what they want and they want patient to be comfort care only at this time.  They agreed to stop all the IV antibiotics at this time.    Overnight events reviewed with the nursing staff taking care of the patient.        Data reviewed today: I reviewed all medications, new labs and imaging results over the last 24 hours. I personally reviewed no images or EKG's today.    Physical Exam   Vital signs:  Temp: 97.7  F (36.5  C) Temp src: Axillary BP: 142/61   Heart Rate: 83 Resp: 16 SpO2: 98 % O2 Device: Nasal cannula Oxygen Delivery: 2 LPM Height: 172.7 cm (5' 8\") Weight: 83.9 kg (185 lb)  Estimated body mass index is 28.13 kg/m  as calculated from the following:    Height as of this encounter: 1.727 m (5' 8\").    Weight as of this encounter: 83.9 kg (185 lb).      Wt Readings from Last 2 Encounters:   04/24/19 83.9 kg (185 lb)       Gen: Lethargic, sedated.  HEENT:no pallor  Resp: Decreased air entry bilaterally, normal effort of breathing.  CVS: Regular, S1-S2 normal, no murmur on rub  Abd/GI: Soft, non-tender. BS- normoactive.   Skin: Warm, dry no rashes  MSK: Left lower extremity edema 1+.  Neuro-  have left-sided weakness.    Data   Recent Labs   Lab 04/25/19  0758 04/24/19  0645 04/23/19  1515 04/23/19  0725   WBC 18.4* 20.8*  --  22.3*   HGB 9.2* 9.3*  --  10.3*   MCV 85 85  --  85    158  --  145*   * 149* 147* 148*   POTASSIUM 3.7 3.9 3.9 3.9   CHLORIDE 118* 115* 116* 116*   CO2 19* 22 22 24   BUN 92* 83* 71* 64*   CR 2.50* 2.19* 1.80* 1.53*   ANIONGAP 12 12 9 8   LULU 7.6* 7.3* 7.8* 8.0*   * 143* 194* 120*   ALBUMIN 1.7* 1.3*  --   --        No results found for this or any previous visit (from the past 24 hour(s)).  Medications     - MEDICATION INSTRUCTIONS -         amiodarone  400 mg Oral Daily     sodium chloride (PF)  10 mL Intracatheter Q8H     sodium chloride (PF)  3 mL Intracatheter Q8H     "

## 2019-04-25 NOTE — PROGRESS NOTES
Patient now on comfort oriented care.   I will sign off.   I did speak to multiple family members and answered questions.     Bertram Carrillo MD

## 2019-04-25 NOTE — PLAN OF CARE
Lethargic, opens eyes occasionally. Patient now on comfort cares. Turned and repositioned frequently. IV Dilaudid given for pain. Aguilar catheter in place and patent. Daughters at bedside.

## 2019-04-25 NOTE — PLAN OF CARE
AZUCENA orientation due to lethargy. Occasionally opens eyes and says last name.  Not participating in assessments- withdraws to pain, PERRLA. VSS on 2 L O2. RR 16-20, labored breathing at times. Tele SR with BBB and PACs. DD1 diet, honey thick liquids. No intake given due to lethargy and unsafe swallow. Frequent oral cares. Up with A2 lif- not up on shift. Turn and repositioned. IV dilaudid given before turns. Plan for nursing to continue to [facility] . Discharge plan pending.

## 2019-04-25 NOTE — PROGRESS NOTES
Change to comfort care noted.  I agree this is a most reasonable path.    I will sign off.    Chao Ayala MD

## 2019-04-25 NOTE — PLAN OF CARE
Difficult to assess neuro's due to lethargy.  States birthday this AM, opens eyes intermittently for family, does not follow commands.  Appears to have a left facial droop, withdraws from pain.  internal jugular pulled, dressing C/D/I, continues on Naficillin.  Incontinent of soft BM x4, c-diff negative, williamson patient, 275cc output.  NPO due to lethargy, oral cares provided, turned q2h, premedicated with Dilaudid for pain.  Palliative following.

## 2019-04-25 NOTE — PROGRESS NOTES
Received a phone call from bedside RN notifying me that pt has been transitioned to comfort measures. This seems very appropriate, given her overall condition, and decline and recent days.     Per RN, symptoms appear to be well managed. I did look at the orders, comfort care orders are in place. I did replace morphine products for dilaudid (IV/SL), given reduced CrCl (better option).     Family does not have any questions for me, per RN, or concerns. I am available if needs arise in the coming days.    Thanks,  Maria Fernanda STRONG, [facility]  Palliative Medicine   Pager: [phone]

## 2019-04-26 LAB
BACTERIA SPEC CULT: NO GROWTH
C3 SERPL-MCNC: 91 MG/DL (ref 76–169)
C4 SERPL-MCNC: 23 MG/DL (ref 15–50)
Lab: NORMAL
SPECIMEN SOURCE: NORMAL

## 2019-04-26 PROCEDURE — 99233 SBSQ HOSP IP/OBS HIGH 50: CPT | Performed by: NURSE PRACTITIONER

## 2019-04-26 PROCEDURE — 25000128 H RX IP 250 OP 636: Performed by: NURSE PRACTITIONER

## 2019-04-26 PROCEDURE — 12000000 ZZH R&B MED SURG/OB

## 2019-04-26 PROCEDURE — 25000132 ZZH RX MED GY IP 250 OP 250 PS 637: Performed by: NURSE PRACTITIONER

## 2019-04-26 PROCEDURE — 99232 SBSQ HOSP IP/OBS MODERATE 35: CPT | Performed by: INTERNAL MEDICINE

## 2019-04-26 PROCEDURE — 99207 ZZC CDG-MDM COMPONENT: MEETS MODERATE - UP CODED: CPT | Performed by: INTERNAL MEDICINE

## 2019-04-26 RX ORDER — HYDROMORPHONE HYDROCHLORIDE 10 MG/ML
2-4 INJECTION INTRAMUSCULAR; INTRAVENOUS; SUBCUTANEOUS
Status: DISCONTINUED | OUTPATIENT
Start: 2019-04-26 | End: 2019-04-26

## 2019-04-26 RX ORDER — HYDROMORPHONE HYDROCHLORIDE 10 MG/ML
4-6 INJECTION INTRAMUSCULAR; INTRAVENOUS; SUBCUTANEOUS
Status: DISCONTINUED | OUTPATIENT
Start: 2019-04-26 | End: 2019-04-30 | Stop reason: HOSPADM

## 2019-04-26 RX ORDER — HYDROMORPHONE HYDROCHLORIDE 1 MG/ML
.5-1 INJECTION, SOLUTION INTRAMUSCULAR; INTRAVENOUS; SUBCUTANEOUS
Status: DISCONTINUED | OUTPATIENT
Start: 2019-04-26 | End: 2019-04-30 | Stop reason: HOSPADM

## 2019-04-26 RX ADMIN — HYDROMORPHONE HYDROCHLORIDE 0.5 MG: 1 INJECTION, SOLUTION INTRAMUSCULAR; INTRAVENOUS; SUBCUTANEOUS at 04:39

## 2019-04-26 RX ADMIN — HYDROMORPHONE HYDROCHLORIDE 2 MG: 10 INJECTION INTRAMUSCULAR; INTRAVENOUS; SUBCUTANEOUS at 12:36

## 2019-04-26 RX ADMIN — HYDROMORPHONE HYDROCHLORIDE 4 MG: 10 INJECTION INTRAMUSCULAR; INTRAVENOUS; SUBCUTANEOUS at 16:46

## 2019-04-26 RX ADMIN — HYDROMORPHONE HYDROCHLORIDE 0.5 MG: 1 INJECTION, SOLUTION INTRAMUSCULAR; INTRAVENOUS; SUBCUTANEOUS at 00:24

## 2019-04-26 ASSESSMENT — ACTIVITIES OF DAILY LIVING (ADL)
ADLS_ACUITY_SCORE: 20
ADLS_ACUITY_SCORE: 22
ADLS_ACUITY_SCORE: 20
ADLS_ACUITY_SCORE: 20

## 2019-04-26 NOTE — PLAN OF CARE
Somnolent. VS deferred d/t comfort cares. FLACC=0 at rest, 4 with repositioning, given 2mg PO hydromorphone x1, moderately effective. Aguilar patent. No BMs overnight. PIV SL. L hip dressing CDI. Bedrest, repo q4hr per family request.  Family at bedside.

## 2019-04-26 NOTE — PLAN OF CARE
Somnolent. Full VS deferred, comfort cares. Moans occasionally, given IV Dilaudid. Aguilar patent. No BMs overnight. PIV SL. L hip dressing CDI. Bedrest. Family at bedside.

## 2019-04-26 NOTE — PROGRESS NOTES
[facility]    Medicine Progress Note - Hospitalist Service        Date of Admission:  4/17/2019  7:56 AM    Assessment & Plan:     This is an 89-year-old female with past medical history significant for hypertension, TIA, hyperlipidemia and recurrent UTIs initially presented with altered mental status and aphasia.  Her hospitalization was complicated by severe sepsis secondary to a septic left total hip arthroplasty and infective endocarditis.  He was found to have MSSA bacteremia.  ID and orthopedic was consulted and are following.  She is status post left total hip arthroplasty IND on 4/18/2019.  Culture growing a staph aureus from there as well.  She was found to have numerous acute/subacute infarct to her brain which are suspected secondary to septic emboli.  Neurology was consulted as well.  She was found to have A. fib with RVR which is now controlled with amiodarone.  Initially she was given IV amiodaroneto oral amiodarone currently in sinus rhythm.  No anticoagulation at this time given septic emboli and high risk for hemorrhagic conversion.  Her course was complicated with acute hypoxic respiratory failure requiring intubation and mechanical ventilation.  Postoperatively now she is extubated and stable.  Infectious disease following and she is started on IV nafcillin.  Infectious disease, orthopedics, neurology and palliative care consulted and following.    Comfort care now  Patient family decided to keep the patient comfort care only.  They do not want any IV antibiotics on lab work at this time.  I will discontinue IV nafcillin, aspirin, Lipitor and IV fluids at this time.  Started on comfort care protocol.  As per patient and family wishes.  Bedside nurse did confirm with the family about the comfort care measures only.     MSSA bacteremia  Septic left total hip arthroplasty status post I&D  Septic shock  Infective endocarditis  -Patient presenting with fever up to 101 at home and  worsening left hip pain, concerns for septic left hip  -Blood culture growing staph aureus  -Orthopedic surgery consulted, patient underwent I&D of septic left GURJIT on 4/18  -Blood Culture and left hip culture growing staph aureus, blood culture from 4/19 and 4/20 negative so far.  -high concerns for infective endocarditis given the positive blood culture, septic cerebral emboli and Janeway lesion  -IV antibiotics now changed to nafcillin, infectious disease following.  Now the patient is comfort care only and IV antibiotics are discontinued.    Numerous acute/subacute infarcts with suspected septic emboli   Acute encephalopathy   -The patient presents with expressive aphasia, some confusion and possibly left visual field cut.    -CT head was read as 2-3 cm area of hypoattenuation and gray-white differentiation loss involving the mid right postcentral gyrus extending into the right parietal white matter.   -MRI of the brain done on 4/18 reveals numerous scattered infarcts involving the cerebral and cerebellar hemispheres bilaterally and basal ganglia bilaterally consistent with embolic infarcts from a central source   -Neurology following  -Continue aspirin alone for now, as anticoagulation would be high risk for hemorrhagic conversion due to septic emboli  -Echocardiogram with normal LV, no obvious valvular vegetation  -LUTHER was offered, family declined; this likely represents septic emboli given the clinical context.  -CT head 4/20 appears to be stable    Patient on 4/24/2019 more lethargic and confused and less responsive.  I did order the CT scan of the head to be done to rule out any acute bleeding or worsening edema.  Patient family as per the nurses does not want to have the CT scan done.  She is now comfort care only.    New onset A. fib with RVR:  -Discontinue amiodarone drip. Start amiodarone 400 mg p.o. daily  -Now converted to normal sinus rhythm  -Continue aspirin alone as anticoagulation would be high  risk for CNS bleed given the concern for septic emboli.    palliative care consulted, consider EP evaluation if patient wants to continue restorative care.    Acute hypoxic respiratory failure  -Was intubated postop, successfully extubated (4/19)  -Respiratory status worse 4/20 despite diuretics, chest x-ray consistent with pulmonary edema  -Now off diuretics; continue to monitor ongoing needs for diuretics as needed  -Incentive spirometry as able  -Oxygenation improved, currently on 2 L    Hyponatremia.   Now resolved  -Sodium at presentation was 129.  The patient reportedly has had problems with low sodium in the recent past.  She was on hydrochlorothiazide and reportedly was stopped just prior to admission.  -Continue to hold hydrochlorothiazide  -Improved, in fact hypernatremic at this time.     Benign essential hypertension.    -prior to admission on amlodipine and losartan   -Sharron-procedure was in shock-most likely a combination of septic and cardiogenic rapid A. Fib  -Blood pressure still have episodes of low blood pressure.  Continue to hold amlodipine and losartan at this time.     Hyperlipidemia.    -Continue Lipitor 40 mg daily when able to take orally.     Dysphagia:  -SLP following, currently on DD-1 diet with honey thickened liquids.    Acute renal failure  This is most likely prerenal, most likely secondary to decreased oral intake and appears of low blood pressures.  Check FENa less than 1%, check urinary eosinophils still pending, most likely is prerenal I did started her on IV fluids 75 mL/h.  But the kidney function get worse even though her blood pressure remained stable I will increase the fluid 100 mL/h at this time.      Need to rule out interstitial nephritis as she is on IV nafcillin, urine eosinophil is still pending.  Creatinine is worsening I will consult nephrology to evaluate the patient  ultrasound of the kidneys to rule out any hydronephrosis    Patient is comfort care  "only.    Hypernatremia  Likely secondary to decreased oral intake of water, start on 5% dextrose with 0.45% normal saline at this time.    Sodium is still on the higher side I will increase to 200 mm/h today.    Goals of care:  Palliative care is consulted and following.        Worsening mental status and renal failure.    Patient family wants only comfort care measures at this time.  Consult  for disposition.      Discussed in detail with the patient and family and all questions answered.  Discussed with the nursing staff taking care of the patient.    Prognosis is guarded          Diet: Combination Diet Dysphagia Diet Level 1: Pureed; Honey Thickened Liquids (pre-thickened or use instant food thickener) (By spoon only)  Snacks/Supplements Adult: Other; Vanilla Magic Shake; With Meals  Snacks/Supplements Adult: Gelatein Plus; Between Meals     DVT Prophylaxis: Pneumatic Compression Devices   Aguilar Catheter: in place, indication: End of Life  Code Status: Special Code     Disposition Plan    Expected discharge: 2 - 3 days    Entered: Ar Akins MD 04/26/2019, 12:00 PM        The patient's care was discussed with the nursing staff, patient family and Dr. Carrillo of infectious disease today.    Ar Akins MD  Hospitalist Service  [facility]    ______________________________________________________________________    Interval History   Remains comfortable at this time.  Family present at bedside all questions answered.  No concern at this time.    Overnight events reviewed with the nursing staff taking care of the patient.        Data reviewed today: I reviewed all medications, new labs and imaging results over the last 24 hours. I personally reviewed no images or EKG's today.    Physical Exam   Vital signs:            Resp: 22   O2 Device: None (Room air) Oxygen Delivery: 2 LPM Height: 172.7 cm (5' 8\") Weight: 83.9 kg (185 lb)  Estimated body mass index is 28.13 kg/m  as calculated from " "the following:    Height as of this encounter: 1.727 m (5' 8\").    Weight as of this encounter: 83.9 kg (185 lb).      Wt Readings from Last 2 Encounters:   04/24/19 83.9 kg (185 lb)       Gen: Lethargic, sedated.  HEENT:no pallor  Resp: Decreased air entry bilaterally, normal effort of breathing.  CVS: Regular, S1-S2 normal, no murmur on rub  Abd/GI: Soft, non-tender. BS- normoactive.   Skin: Warm, dry no rashes  MSK: Left lower extremity edema 1+.  Neuro-  have left-sided weakness.    Data   Recent Labs   Lab 04/25/19  0758 04/24/19  0645 04/23/19  1515 04/23/19  0725   WBC 18.4* 20.8*  --  22.3*   HGB 9.2* 9.3*  --  10.3*   MCV 85 85  --  85    158  --  145*   * 149* 147* 148*   POTASSIUM 3.7 3.9 3.9 3.9   CHLORIDE 118* 115* 116* 116*   CO2 19* 22 22 24   BUN 92* 83* 71* 64*   CR 2.50* 2.19* 1.80* 1.53*   ANIONGAP 12 12 9 8   LULU 7.6* 7.3* 7.8* 8.0*   * 143* 194* 120*   ALBUMIN 1.7* 1.3*  --   --        No results found for this or any previous visit (from the past 24 hour(s)).  Medications     - MEDICATION INSTRUCTIONS -         amiodarone  400 mg Oral Daily     sodium chloride (PF)  3 mL Intracatheter Q8H     "

## 2019-04-26 NOTE — PROGRESS NOTES
[facility]    Palliative Care Progress Note    Belgica Klein  MRN# [id number]  Date of Admission:  2019  Date of Service (when I saw the patient): 2019    Assessment & Plan   Belgica Klein is a 89 year old female with PMH significant for HTN, TIA, HLD, and recurrent UTIs, who presents with AMS and aphasia. Her hospitalization has been complicated by severe sepsis 2/2 septic left total hip arthroplasty and infective endocarditis, found to have MSSA bacteremia, followed by ID and Ortho. She is s/p  I&D septic left GURJIT on . She was also found to have numerous acute/subacute infarcts to her brain, which are suspected septic emboli, Neurology involved. Other complications include a.fib with RVR and acute hypoxic respiratory failure requiring intubation post-op, now successfully extubated and stable. SLP is following for proper diet, on modified textured diet. Overall, it appeared pt's condition was a bit more stable in recent days, but now with worsening WENDI and recurrent encephalopathy. Pt officially transitioned to comfort measures on . We are following for goals of care and pt and family support.      Symptoms/Recommendations   -Continue comfort measures only   -Dilaudid  0.5-1mg IV Q1hr PRN or dilaudid 4-6mg SL every 2hrs PRN pain, SOB   -Other comfort measure orders in place per hospitalist   -Pt appears to be actively dying and possibly may die inpatient. Did inform family that discussions may take place about hospice planning, should pt plateau and appear to be stable to transfer out of the hospital. Family is very hopeful she can remain in the hospital and die here      Support/Coping  -  in . Supportive children (5), 2 local; Chalino, Jatin, Karla, Paula, and Kelsy   -Family declining spiritual health support at this time. They will call upon request      Decisional Support, Goals of Care, Counseling & Coordination  Decisional Capacity Intact?  -No  Health Care  Directive on File?  -No  Code Status/Resuscitation Preferences?  -DNR/DNI     Case reviewed with bedside RN Mary.     Thank you for involving us in the care of this patient and family. We will continue to follow. Please do not hesitate to contact me with questions or concerns or the on-call provider for our team if evening or weekend.    Maria Fernanda STRONG, CNP  Palliative Medicine   Pager [phone]    Attestation:  Total time on the floor involved in the patient's care: 35 minutes  Total time spent in counseling/care coordination: >50%    Interval History   Pt transitioned to comfort measures yesterday. I replaced morphine with dilaudid products given her reduced CrCl. Per family request, I visited with daughters in the sharp to discuss what the dying process looks like. Family feels she is very comfortable. We talked about various breathing pattern changes and mottling/cooling/cyanosis of extremities in the final days of life. I prepared family that we do not truly know how long a person may live, and should Belgica's condition plateau, there may be conversations about transferring out of the hospital with hospice. At this time, family is very hopeful that Belgica can remain in the hospital and die here.     Medications   Current Facility-Administered Medications Ordered in Epic   Medication Dose Route Frequency Last Rate Last Dose     acetaminophen (TYLENOL) Suppository 650 mg  650 mg Rectal Q6H PRN         acetaminophen (TYLENOL) tablet 650 mg  650 mg Oral Q6H PRN         albuterol (PROAIR HFA/PROVENTIL HFA/VENTOLIN HFA) 108 (90 Base) MCG/ACT inhaler 2 puff  2 puff Inhalation Q6H PRN         albuterol (PROVENTIL) neb solution 2.5 mg  2.5 mg Nebulization Q2H PRN   2.5 mg at 04/20/19 2312     amiodarone (PACERONE/CODARONE) tablet 400 mg  400 mg Oral Daily   400 mg at 04/23/19 0852     atropine 1 % ophthalmic solution 1-2 drop  1-2 drop Sublingual Q1H PRN         benzocaine-menthol (CHLORASEPTIC) 6-10 MG lozenge 1-2  lozenge  1-2 lozenge Buccal Q1H PRN         benztropine (COGENTIN) tablet 1-2 mg  1-2 mg Oral TID PRN         [START ON 4/28/2019] bisacodyl (DULCOLAX) Suppository 10 mg  10 mg Rectal Once PRN         glucose gel 15-30 g  15-30 g Oral Q15 Min PRN        Or     dextrose 50 % injection 25-50 mL  25-50 mL Intravenous Q15 Min PRN        Or     glucagon injection 1 mg  1 mg Subcutaneous Q15 Min PRN         glycopyrrolate (ROBINUL) injection 0.2-0.4 mg  0.2-0.4 mg Intravenous Q4H PRN         glycopyrrolate (ROBINUL) tablet 2-4 mg  2-4 mg Oral Q4H PRN         haloperidol lactate (HALDOL) injection 0.5-1 mg  0.5-1 mg Intravenous Q1H PRN         HYDROmorphone (DILAUDID) (HIGH CONC) oral solution 4-6 mg  4-6 mg Sublingual Q2H PRN         HYDROmorphone (PF) (DILAUDID) injection 0.5-1 mg  0.5-1 mg Intravenous Q1H PRN         hypromellose-dextran (ARTIFICAL TEARS) 0.1-0.3 % ophthalmic solution 1-2 drop  1-2 drop Both Eyes Q8H PRN         labetalol (NORMODYNE/TRANDATE) syringe 10-40 mg  10-40 mg Intravenous Q10 Min PRN         lidocaine (LMX4) cream   Topical Q1H PRN         lidocaine 1 % 0.1-1 mL  0.1-1 mL Other Q1H PRN         LORazepam (ATIVAN) injection 0.5-1 mg  0.5-1 mg Intravenous Q3H PRN        Or     LORazepam (ATIVAN) tablet 0.5-1 mg  0.5-1 mg Oral Q3H PRN        Or     LORazepam (ATIVAN) tablet 0.5-1 mg  0.5-1 mg Sublingual Q3H PRN         magnesium sulfate 2 g in water intermittent infusion  2 g Intravenous Daily PRN 50 mL/hr at 04/21/19 0515 2 g at 04/21/19 0515     magnesium sulfate 4 g in 100 mL sterile water (premade)  4 g Intravenous Q4H PRN         Medication Instruction   Does not apply Continuous PRN         melatonin tablet 1 mg  1 mg Oral At Bedtime PRN         metoclopramide (REGLAN) tablet 5 mg  5 mg Oral Q6H PRN        Or     metoclopramide (REGLAN) injection 5 mg  5 mg Intravenous Q6H PRN         naloxone (NARCAN) injection 0.1-0.4 mg  0.1-0.4 mg Intravenous Q2 Min PRN         nitroGLYcerin (NITROSTAT)  sublingual tablet 0.4 mg  0.4 mg Sublingual Q5 Min PRN         OLANZapine zydis (zyPREXA) ODT half-tab 2.5-5 mg  2.5-5 mg Oral Q6H PRN         ondansetron (ZOFRAN-ODT) ODT tab 4 mg  4 mg Oral Q6H PRN        Or     ondansetron (ZOFRAN) injection 4 mg  4 mg Intravenous Q6H PRN         prochlorperazine (COMPAZINE) injection 5 mg  5 mg Intravenous Q6H PRN        Or     prochlorperazine (COMPAZINE) tablet 5 mg  5 mg Oral Q6H PRN        Or     prochlorperazine (COMPAZINE) Suppository 12.5 mg  12.5 mg Rectal Q12H PRN         senna-docusate (SENOKOT-S/PERICOLACE) 8.6-50 MG per tablet 1 tablet  1 tablet Oral BID PRN        Or     senna-docusate (SENOKOT-S/PERICOLACE) 8.6-50 MG per tablet 2 tablet  2 tablet Oral BID PRN         sodium chloride (PF) 0.9% PF flush 10-20 mL  10-20 mL Intracatheter q1 min prn         sodium chloride (PF) 0.9% PF flush 3 mL  3 mL Intracatheter q1 min prn   3 mL at 04/25/19 1924     sodium chloride (PF) 0.9% PF flush 3 mL  3 mL Intracatheter Q8H   3 mL at 04/25/19 1805     No current Epic-ordered outpatient medications on file.       Physical Exam             Resp: 22   O2 Device: None (Room air)    Vitals:    04/21/19 0400 04/22/19 0400 04/24/19 0500   Weight: 77.1 kg (169 lb 15.6 oz) 77.3 kg (170 lb 6.7 oz) 83.9 kg (185 lb)     CONSTITUTIONAL: Chronically ill elderly woman seen resting in bed comfortably, somnolent, family present    Data   No results found for this or any previous visit (from the past 24 hour(s)).

## 2019-04-27 LAB
BACTERIA SPEC CULT: ABNORMAL
Lab: ABNORMAL
SPECIMEN SOURCE: ABNORMAL

## 2019-04-27 PROCEDURE — 12000000 ZZH R&B MED SURG/OB

## 2019-04-27 PROCEDURE — 25000132 ZZH RX MED GY IP 250 OP 250 PS 637: Performed by: NURSE PRACTITIONER

## 2019-04-27 PROCEDURE — 99231 SBSQ HOSP IP/OBS SF/LOW 25: CPT | Performed by: INTERNAL MEDICINE

## 2019-04-27 RX ADMIN — HYDROMORPHONE HYDROCHLORIDE 4 MG: 10 INJECTION INTRAMUSCULAR; INTRAVENOUS; SUBCUTANEOUS at 18:52

## 2019-04-27 RX ADMIN — HYDROMORPHONE HYDROCHLORIDE 4 MG: 10 INJECTION INTRAMUSCULAR; INTRAVENOUS; SUBCUTANEOUS at 10:28

## 2019-04-27 ASSESSMENT — ACTIVITIES OF DAILY LIVING (ADL)
ADLS_ACUITY_SCORE: 22
ADLS_ACUITY_SCORE: 18
ADLS_ACUITY_SCORE: 22

## 2019-04-27 NOTE — PLAN OF CARE
Comfort cares VS and assessment deferred. Somnolent. FLACC=2 with repositioning, PRN dilaudid 4mg po given x1 with relief. FLACC=0 at rest. Bedrest. Pt repositioned q4h per family request. Appears comfortable. PIV removed per family request. Aguilar patent with clear yellow urine output. L hip dressing CDI with small amount old drainage. Bedrest. +2 generalized edema, +3 edema in hands and feet, elevated on pillows. Family at bedside, supportive. Will continue to monitor.

## 2019-04-27 NOTE — PLAN OF CARE
Family at bedside with patient, very attentive and active with cares.  Pt remains mostly nonverbal.  Turn q 2-4 hours at families request.  Shoulders very tender, upper extremities very edematous.  Dilaudid given x1 for pain. Aguilar in place.  Palliative care following.

## 2019-04-27 NOTE — PROGRESS NOTES
[facility]    Medicine Progress Note - Hospitalist Service        Date of Admission:  4/17/2019  7:56 AM    Assessment & Plan:     This is an 89-year-old female with past medical history significant for hypertension, TIA, hyperlipidemia and recurrent UTIs initially presented with altered mental status and aphasia.  Her hospitalization was complicated by severe sepsis secondary to a septic left total hip arthroplasty and infective endocarditis.  He was found to have MSSA bacteremia.  ID and orthopedic was consulted and are following.  She is status post left total hip arthroplasty IND on 4/18/2019.  Culture growing a staph aureus from there as well.  She was found to have numerous acute/subacute infarct to her brain which are suspected secondary to septic emboli.  Neurology was consulted as well.  She was found to have A. fib with RVR which is now controlled with amiodarone.  Initially she was given IV amiodaroneto oral amiodarone currently in sinus rhythm.  No anticoagulation at this time given septic emboli and high risk for hemorrhagic conversion.  Her course was complicated with acute hypoxic respiratory failure requiring intubation and mechanical ventilation.  Postoperatively now she is extubated and stable.  Infectious disease following and she is started on IV nafcillin.  Infectious disease, orthopedics, neurology and palliative care consulted and following.    Comfort care now  Patient family decided to keep the patient comfort care only.  They do not want any IV antibiotics on lab work at this time.  I will discontinue IV nafcillin, aspirin, Lipitor and IV fluids at this time.  Started on comfort care protocol.  As per patient and family wishes.  Bedside nurse did confirm with the family about the comfort care measures only.  Palliative care is following as well.     MSSA bacteremia  Septic left total hip arthroplasty status post I&D  Septic shock  Infective endocarditis  -Patient presenting  with fever up to 101 at home and worsening left hip pain, concerns for septic left hip  -Blood culture growing staph aureus  -Orthopedic surgery consulted, patient underwent I&D of septic left GURJIT on 4/18  -Blood Culture and left hip culture growing staph aureus, blood culture from 4/19 and 4/20 negative so far.  -high concerns for infective endocarditis given the positive blood culture, septic cerebral emboli and Janeway lesion  -IV antibiotics now changed to nafcillin, infectious disease following.  Now the patient is comfort care only and IV antibiotics are discontinued.    Numerous acute/subacute infarcts with suspected septic emboli   Acute encephalopathy   -The patient presents with expressive aphasia, some confusion and possibly left visual field cut.    -CT head was read as 2-3 cm area of hypoattenuation and gray-white differentiation loss involving the mid right postcentral gyrus extending into the right parietal white matter.   -MRI of the brain done on 4/18 reveals numerous scattered infarcts involving the cerebral and cerebellar hemispheres bilaterally and basal ganglia bilaterally consistent with embolic infarcts from a central source   -Neurology following  -Continue aspirin alone for now, as anticoagulation would be high risk for hemorrhagic conversion due to septic emboli  -Echocardiogram with normal LV, no obvious valvular vegetation  -LUTHER was offered, family declined; this likely represents septic emboli given the clinical context.  -CT head 4/20 appears to be stable    Patient on 4/24/2019 more lethargic and confused and less responsive.  I did order the CT scan of the head to be done to rule out any acute bleeding or worsening edema.  Patient family as per the nurses does not want to have the CT scan done.  She is now comfort care only.    New onset A. fib with RVR:  -Discontinue amiodarone drip. Start amiodarone 400 mg p.o. daily  -Now converted to normal sinus rhythm  -Continue aspirin alone as  anticoagulation would be high risk for CNS bleed given the concern for septic emboli.    palliative care consulted,     Acute hypoxic respiratory failure  -Was intubated postop, successfully extubated (4/19)  -Respiratory status worse 4/20 despite diuretics, chest x-ray consistent with pulmonary edema  -Now off diuretics; continue to monitor ongoing needs for diuretics as needed  -Incentive spirometry as able  -Oxygenation improved, currently on room air    Hyponatremia.   Now resolved  -Sodium at presentation was 129.  The patient reportedly has had problems with low sodium in the recent past.  She was on hydrochlorothiazide and reportedly was stopped just prior to admission.  -Continue to hold hydrochlorothiazide  -Improved, in fact hypernatremic at this time.     Benign essential hypertension.    -prior to admission on amlodipine and losartan   -Sharron-procedure was in shock-most likely a combination of septic and cardiogenic rapid A. Fib  -Blood pressure still have episodes of low blood pressure.  Continue to hold amlodipine and losartan at this time.     Hyperlipidemia.    -Was not Lipitor 40 mg daily when able to take orally.     Dysphagia:  -SLP following, currently on DD-1 diet with honey thickened liquids.    Acute renal failure  This is most likely prerenal, most likely secondary to decreased oral intake and appears of low blood pressures.  Check FENa less than 1%, check urinary eosinophils still pending, most likely is prerenal I did started her on IV fluids    Need to rule out interstitial nephritis as she is on IV nafcillin, urine eosinophil.  Creatinine is worsening consulted nephrology to evaluate the patient  ultrasound of the kidneys to rule out any hydronephrosis  Patient is comfort care only now.    Hypernatremia  Likely secondary to decreased oral intake of water, start on 5% dextrose with 0.45% normal saline at this time.    Sodium is still on the higher side I will increase to 200 mm/h  "today.    Goals of care:  Comfort care      Worsening mental status and renal failure.    Patient family wants only comfort care measures at this time.  Consult  for disposition.      Discussed in detail with the patient and family and all questions answered.  Discussed with the nursing staff taking care of the patient.    Prognosis is poor         Diet: Combination Diet Dysphagia Diet Level 1: Pureed; Honey Thickened Liquids (pre-thickened or use instant food thickener) (By spoon only)  Snacks/Supplements Adult: Other; Vanilla Magic Shake; With Meals  Snacks/Supplements Adult: Gelatein Plus; Between Meals     DVT Prophylaxis: Pneumatic Compression Devices   Aguilar Catheter: in place, indication: End of Life  Code Status: Special Code     Disposition Plan    Expected discharge: 2 - 3 days    Entered: Ar Akins MD 04/27/2019, 11:01 AM        The patient's care was discussed with the nursing staff, patient family and Dr. Carrillo of infectious disease today.    Ar Akins MD  Hospitalist Service  [facility]    ______________________________________________________________________    Interval History   Family present at bedside, patient remained comfortable, sleeping and minimally responsive.    Overnight events reviewed with the nursing staff taking care of the patient.        Data reviewed today: I reviewed all medications, new labs and imaging results over the last 24 hours. I personally reviewed no images or EKG's today.    Physical Exam   Vital signs:            Resp: 20     Oxygen Delivery: 2 LPM Height: 172.7 cm (5' 8\") Weight: 83.9 kg (185 lb)  Estimated body mass index is 28.13 kg/m  as calculated from the following:    Height as of this encounter: 1.727 m (5' 8\").    Weight as of this encounter: 83.9 kg (185 lb).      Wt Readings from Last 2 Encounters:   04/24/19 83.9 kg (185 lb)       Gen: Lethargic, sedated.  Minimally responsive  HEENT:no pallor  Resp: Decreased air entry " bilaterally, normal effort of breathing.  CVS: Regular, S1-S2 normal, no murmur on rub  Abd/GI: Soft, non-tender. BS- normoactive.   Skin: Warm, dry no rashes  MSK: Left lower extremity edema 1+.  Neuro-  have left-sided weakness.    Data   Recent Labs   Lab 04/25/19  0758 04/24/19  0645 04/23/19  1515 04/23/19  0725   WBC 18.4* 20.8*  --  22.3*   HGB 9.2* 9.3*  --  10.3*   MCV 85 85  --  85    158  --  145*   * 149* 147* 148*   POTASSIUM 3.7 3.9 3.9 3.9   CHLORIDE 118* 115* 116* 116*   CO2 19* 22 22 24   BUN 92* 83* 71* 64*   CR 2.50* 2.19* 1.80* 1.53*   ANIONGAP 12 12 9 8   LULU 7.6* 7.3* 7.8* 8.0*   * 143* 194* 120*   ALBUMIN 1.7* 1.3*  --   --        No results found for this or any previous visit (from the past 24 hour(s)).  Medications     - MEDICATION INSTRUCTIONS -         amiodarone  400 mg Oral Daily

## 2019-04-27 NOTE — PLAN OF CARE
No events overnight. Comfort cares continue. Somnolent. Slight repositioning every 4 hours per family request. No non-verbal signs of discomfort. Daughter Tea at bedside.

## 2019-04-27 NOTE — CONSULTS
Care Transition Initial Assessment - SW     Met with: FAMILY   Active Problems:    Acute CVA (cerebrovascular accident) (H)    Septic hip (H)       DATA  Lives With: alone   Living Arrangements: house  Description of Support System: Supportive, Involved  Who is your support system?: Children  Support Assessment: Adequate family and caregiver support.   Identified issues/concerns regarding health management: SW following for end of life/hospice planning    ASSESSMENT  Cognitive Status:  AZUCENA  Concerns to be addressed: patient is a 89 year old female who was admitted to the hospital for acute CVA. Prior to hospitalization, patient was living in [place] where she was managing well. Patient's daughter's understand that patient's condition is deteriorating and they are in agreement for hospice. SW met with daughters to discuss hospice. Daughters are agreeable to using  Hospice. SW explained hospice benefit, where patient can discharge with hospice, and the private fee for room and board if facility is needed. SW called  hospice and arranged a consult for 4/28 at 930. SW will follow up after consult.      PLAN  Financial costs for the patient includes   Patient given options and choices for discharge with hospice  Patient/family is agreeable to the plan?  YES  Patient Goals and Preferences: [facility]  Patient anticipates discharging to: [facility]   *[phone]

## 2019-04-28 ENCOUNTER — DOCUMENTATION ONLY (OUTPATIENT)
Dept: CARE COORDINATION | Facility: CLINIC | Age: 84
End: 2019-04-28

## 2019-04-28 PROCEDURE — 25000132 ZZH RX MED GY IP 250 OP 250 PS 637: Performed by: NURSE PRACTITIONER

## 2019-04-28 PROCEDURE — 12000000 ZZH R&B MED SURG/OB

## 2019-04-28 PROCEDURE — 99231 SBSQ HOSP IP/OBS SF/LOW 25: CPT | Performed by: INTERNAL MEDICINE

## 2019-04-28 RX ADMIN — HYDROMORPHONE HYDROCHLORIDE 4 MG: 10 INJECTION INTRAMUSCULAR; INTRAVENOUS; SUBCUTANEOUS at 22:48

## 2019-04-28 ASSESSMENT — ACTIVITIES OF DAILY LIVING (ADL)
ADLS_ACUITY_SCORE: 20
ADLS_ACUITY_SCORE: 18

## 2019-04-28 NOTE — PROGRESS NOTES
RN writer met with pts dtrs Kelsy, Karla and Tea to review hospice philosophy of care, available services and medicare benefit.  Pt did not participate in visit.  Family has limited experience with hospice as their father had hospice services for only a couple of hours before he passed away.  We talked about how our services would look in the home setting vs hospice residential or SNF.  Family is interested in hospice residential facility at this time and [facility] specific for its location and understand the daily room and board/caregiving rate is a private expense not covered by hospice.  They also understand pt will transport via stretcher and that transport may not be covered by insurance and to follow up with hospital SW for more detailed info on this.  Per report with Susie NORMAN pt was somewhat lucid this am, eating and drinking and a bit conversive.  Writer did peek in hospital room and pt is noted to be lying supine sleeping in bed and appears to be diaphoretic.  Tea and Karla live locally and Kelsy is here for at least a week from [place].  There is also 2 brothers who live in [facility] and [place] who were here last week visiting.  All seem to be supportive of comfort focused approach.  Pt has 94 y/o independent sister living in [place] who may need extra support thru this.  All IVs have been discontinued per family request and goals of care.  Karla reports she is the decision maker although pt does not seem to have HCD or HCPOA appointed.  Pt is .  Please generate POLST indicating comfort cares.    No consents signed today given it is undetermined when pt will discharge from hospital.  Per hospitalist report 4/27 it notes 2-3 days or if she will be stable for transport.  Ciara MCBRIDE will work on referrals to [facility] and other agencies per family request and family will tour this afternoon.   Once discharge plan confirmed, please update hospice so that consents may be signed.  If pt discharging to SNF or  hospice residential no equipment indicated.      Please fill the following recommended comfort meds at discharge.  Bubble wrapped and no ranges    Hydromorphone 10 mg/mL  Give 1 mg/0.1 mL PO/SL every 2 hours as needed pain/SOB #30 mL  Lorazepam 2 mg/mL Give 0.25/0.125 mL PO/SL every 4 hours as needed anxiety/restlessness  Haloperidol 2 mg/mL Give 0.5 mg/0.25 mL PO/SL every 6 hours as needed nausea/agitation  Acetaminophen 650 mg supp MI every 4 hrs as needed for pain/fever #2  bisacodyl 10 mg supp MI daily as needed for constipation #2,   atropine sulfate 1% 2 to 4 drops SL as needed every 2 hrs for terminal congestion/secretions # 2ml   Senna 8.6 mg  Give 1 tab PO BID as needed for constipation  #30    Care coordinated today with pts dtrs, Ciara MCBRIDE and Susie NORMAN    Thank you for referral.    Domenica Medina RN  Admissions Clinician   [facility]   [phone]

## 2019-04-28 NOTE — PROGRESS NOTES
RAE  I: RAE was updated that patient's family would like SW to look into residential hospice homes in the area. RAE sent referral to [facility]. RAE will update family once assessments are complete and bed is confirmed. If no bed is available at [facility], RAE will send additional referrals.    ADDENDUM  I: RAE spoke with family and they are planning to tour [facility] today. RAE called and updated staff. Staff could not confirm that a bed was available at this time.     P: RAE will continue to follow and assist as needed.    Ciara Blandon, [facility]IS[facility]S   *[phone]

## 2019-04-28 NOTE — PLAN OF CARE
Comfort care patient. Formal assessment and VS deferred. Patient somnolent. FLACC used to assess pain, 0 with rest, 1-2 when repositioning - PRN SL Dilaudid (4mg) x1 prior to repositioning this evening. Heat pack applied to neck due to stiffness per daughter request. Patient turned and repositioned q2-4h per family request. BUE and BLE +2-4, elevated with pillows. Breathing non-labored, no signs of distress, patient on RA.  Aguilar catheter patient with minimal clear yellow urine output. No IV access. Plan for hospice meeting at 0930 tomorrow morning. Patients daughters at bedside throughout shift helping with cares, daughter Karla plans on staying overnight. Nursing to continue to monitor.

## 2019-04-28 NOTE — PROGRESS NOTES
Patient is somnolent, AZUCENA orientation. Does arouse at times. Bedrest, repositioned as requested per family. Patient does not appear to be in any pain, Flacc 0. DD1 + honey thick, being fed by family. Aguilar patent with adequate output. Incontinent of bowel. PIV removed per family request. Hospice meeting today. Plan: Discharge pending.

## 2019-04-28 NOTE — PROGRESS NOTES
[facility]    Medicine Progress Note - Hospitalist Service        Date of Admission:  4/17/2019  7:56 AM    Assessment & Plan:     This is an 89-year-old female with past medical history significant for hypertension, TIA, hyperlipidemia and recurrent UTIs initially presented with altered mental status and aphasia.  Her hospitalization was complicated by severe sepsis secondary to a septic left total hip arthroplasty and infective endocarditis.  He was found to have MSSA bacteremia.  ID and orthopedic was consulted and are following.  She is status post left total hip arthroplasty IND on 4/18/2019.  Culture growing a staph aureus from there as well.  She was found to have numerous acute/subacute infarct to her brain which are suspected secondary to septic emboli.  Neurology was consulted as well.  She was found to have A. fib with RVR which is now controlled with amiodarone.  Initially she was given IV amiodaroneto oral amiodarone currently in sinus rhythm.  No anticoagulation at this time given septic emboli and high risk for hemorrhagic conversion.  Her course was complicated with acute hypoxic respiratory failure requiring intubation and mechanical ventilation.  Postoperatively now she is extubated and stable.  Infectious disease following and she is started on IV nafcillin.  Infectious disease, orthopedics, neurology and palliative care consulted and following.    Comfort care now  Patient family decided to keep the patient comfort care only.  They do not want any IV antibiotics on lab work at this time.  I will discontinue IV nafcillin, aspirin, Lipitor and IV fluids at this time.  Started on comfort care protocol.  As per patient and family wishes.  Bedside nurse did confirm with the family about the comfort care measures only.  Palliative care is following as well.     MSSA bacteremia  Septic left total hip arthroplasty status post I&D  Septic shock  Infective endocarditis  -Patient presenting  with fever up to 101 at home and worsening left hip pain, concerns for septic left hip  -Blood culture growing staph aureus  -Orthopedic surgery consulted, patient underwent I&D of septic left GURJIT on 4/18  -Blood Culture and left hip culture growing staph aureus, blood culture from 4/19 and 4/20 negative so far.  -high concerns for infective endocarditis given the positive blood culture, septic cerebral emboli and Janeway lesion  -IV antibiotics now changed to nafcillin, infectious disease following.  Now the patient is comfort care only and IV antibiotics are discontinued.    Numerous acute/subacute infarcts with suspected septic emboli   Acute encephalopathy   -The patient presents with expressive aphasia, some confusion and possibly left visual field cut.    -CT head was read as 2-3 cm area of hypoattenuation and gray-white differentiation loss involving the mid right postcentral gyrus extending into the right parietal white matter.   -MRI of the brain done on 4/18 reveals numerous scattered infarcts involving the cerebral and cerebellar hemispheres bilaterally and basal ganglia bilaterally consistent with embolic infarcts from a central source   -Neurology following  -Continue aspirin alone for now, as anticoagulation would be high risk for hemorrhagic conversion due to septic emboli  -Echocardiogram with normal LV, no obvious valvular vegetation  -LUTHER was offered, family declined; this likely represents septic emboli given the clinical context.  -CT head 4/20 appears to be stable    Patient on 4/24/2019 more lethargic and confused and less responsive.  I did order the CT scan of the head to be done to rule out any acute bleeding or worsening edema.  Patient family as per the nurses does not want to have the CT scan done.  She is now comfort care only.    New onset A. fib with RVR:  -Discontinue amiodarone drip. Start amiodarone 400 mg p.o. daily  -Now converted to normal sinus rhythm  -Continue aspirin alone as  anticoagulation would be high risk for CNS bleed given the concern for septic emboli.    palliative care consulted,     Acute hypoxic respiratory failure  -Was intubated postop, successfully extubated (4/19)  -Respiratory status worse 4/20 despite diuretics, chest x-ray consistent with pulmonary edema  -Now off diuretics; continue to monitor ongoing needs for diuretics as needed  -Incentive spirometry as able  -Oxygenation improved, currently on room air    Hyponatremia.   Now resolved  -Sodium at presentation was 129.  The patient reportedly has had problems with low sodium in the recent past.  She was on hydrochlorothiazide and reportedly was stopped just prior to admission.  -Continue to hold hydrochlorothiazide  -Improved, in fact hypernatremic at this time.     Benign essential hypertension.    -prior to admission on amlodipine and losartan   -Sharron-procedure was in shock-most likely a combination of septic and cardiogenic rapid A. Fib  -Blood pressure still have episodes of low blood pressure.  Continue to hold amlodipine and losartan at this time.     Hyperlipidemia.    -Was not Lipitor 40 mg daily when able to take orally.     Dysphagia:  -SLP following, currently on DD-1 diet with honey thickened liquids.    Acute renal failure  This is most likely prerenal, most likely secondary to decreased oral intake and appears of low blood pressures.  Check FENa less than 1%, check urinary eosinophils still pending, most likely is prerenal I did started her on IV fluids    Need to rule out interstitial nephritis as she is on IV nafcillin, urine eosinophil.  Creatinine is worsening consulted nephrology to evaluate the patient  ultrasound of the kidneys to rule out any hydronephrosis  Patient is comfort care only now.    Hypernatremia  Likely secondary to decreased oral intake of water, start on 5% dextrose with 0.45% normal saline at this time.    Sodium is still on the higher side I will increase to 200 mm/h  "today.    Goals of care:  Comfort care      Worsening mental status and renal failure.    Patient family wants only comfort care measures at this time.  Consult  for disposition.      Discussed in detail with the patient and family and all questions answered.  Discussed with the nursing staff taking care of the patient.    Prognosis is poor         Diet: Combination Diet Dysphagia Diet Level 1: Pureed; Honey Thickened Liquids (pre-thickened or use instant food thickener) (By spoon only)  Snacks/Supplements Adult: Other; Vanilla Magic Shake; With Meals  Snacks/Supplements Adult: Gelatein Plus; Between Meals     DVT Prophylaxis: Pneumatic Compression Devices   Aguilar Catheter: in place, indication: End of Life  Code Status: Special Code     Disposition Plan    Expected discharge: 2 - 3 days    Entered: Ar Akins MD 04/28/2019, 11:27 AM        The patient's care was discussed with the nursing staff, patient family and Dr. Carrillo of infectious disease today.    Ar Akins MD  Hospitalist Service  [facility]    ______________________________________________________________________    Interval History   Patient remained comfortable, unresponsive at this time.  No change in clinical status at this time.    Overnight events reviewed with the nursing staff taking care of the patient.        Data reviewed today: I reviewed all medications, new labs and imaging results over the last 24 hours. I personally reviewed no images or EKG's today.    Physical Exam   Vital signs:                  Oxygen Delivery: 2 LPM Height: 172.7 cm (5' 8\") Weight: 83.9 kg (185 lb)  Estimated body mass index is 28.13 kg/m  as calculated from the following:    Height as of this encounter: 1.727 m (5' 8\").    Weight as of this encounter: 83.9 kg (185 lb).      Wt Readings from Last 2 Encounters:   04/24/19 83.9 kg (185 lb)       Gen: Lethargic, sedated.  Minimally responsive  HEENT:no pallor  Resp: Decreased air entry " bilaterally, normal effort of breathing.  CVS: Regular, S1-S2 normal, no murmur on rub  Abd/GI: Soft, non-tender. BS- normoactive.   Skin: Warm, dry no rashes  MSK: Left lower extremity edema 1+.  Neuro-  have left-sided weakness.    Data   Recent Labs   Lab 04/25/19  0758 04/24/19  0645 04/23/19  1515 04/23/19  0725   WBC 18.4* 20.8*  --  22.3*   HGB 9.2* 9.3*  --  10.3*   MCV 85 85  --  85    158  --  145*   * 149* 147* 148*   POTASSIUM 3.7 3.9 3.9 3.9   CHLORIDE 118* 115* 116* 116*   CO2 19* 22 22 24   BUN 92* 83* 71* 64*   CR 2.50* 2.19* 1.80* 1.53*   ANIONGAP 12 12 9 8   LULU 7.6* 7.3* 7.8* 8.0*   * 143* 194* 120*   ALBUMIN 1.7* 1.3*  --   --        No results found for this or any previous visit (from the past 24 hour(s)).  Medications     - MEDICATION INSTRUCTIONS -         amiodarone  400 mg Oral Daily

## 2019-04-29 VITALS
WEIGHT: 185 LBS | RESPIRATION RATE: 22 BRPM | HEART RATE: 91 BPM | HEIGHT: 68 IN | SYSTOLIC BLOOD PRESSURE: 142 MMHG | TEMPERATURE: 97.7 F | OXYGEN SATURATION: 98 % | DIASTOLIC BLOOD PRESSURE: 61 MMHG | BODY MASS INDEX: 28.04 KG/M2

## 2019-04-29 PROCEDURE — 99231 SBSQ HOSP IP/OBS SF/LOW 25: CPT | Performed by: INTERNAL MEDICINE

## 2019-04-29 PROCEDURE — 25000132 ZZH RX MED GY IP 250 OP 250 PS 637: Performed by: NURSE PRACTITIONER

## 2019-04-29 PROCEDURE — 12000000 ZZH R&B MED SURG/OB

## 2019-04-29 RX ORDER — SENNOSIDES 8.6 MG
1 TABLET ORAL 2 TIMES DAILY PRN
Qty: 100 TABLET | Refills: 1 | Status: SHIPPED | OUTPATIENT
Start: 2019-04-29 | End: 2019-04-29

## 2019-04-29 RX ORDER — ACETAMINOPHEN 650 MG/1
650 SUPPOSITORY RECTAL EVERY 4 HOURS PRN
Qty: 12 SUPPOSITORY | Refills: 1 | Status: SHIPPED | OUTPATIENT
Start: 2019-04-29

## 2019-04-29 RX ORDER — BISACODYL 10 MG
SUPPOSITORY, RECTAL RECTAL
Qty: 12 SUPPOSITORY | Refills: 1 | Status: SHIPPED | OUTPATIENT
Start: 2019-04-29 | End: 2019-04-29

## 2019-04-29 RX ORDER — HYDROMORPHONE HYDROCHLORIDE 1 MG/ML
1 SOLUTION ORAL
Qty: 30 ML | Refills: 0 | Status: SHIPPED | OUTPATIENT
Start: 2019-04-29 | End: 2019-04-29

## 2019-04-29 RX ORDER — HALOPERIDOL 0.5 MG/1
0.5 TABLET ORAL EVERY 6 HOURS PRN
Qty: 30 TABLET | Refills: 1 | Status: SHIPPED | OUTPATIENT
Start: 2019-04-29 | End: 2019-04-29

## 2019-04-29 RX ORDER — HYDROMORPHONE HYDROCHLORIDE 1 MG/ML
4-6 SOLUTION ORAL
Qty: 30 ML | Refills: 0 | Status: SHIPPED | OUTPATIENT
Start: 2019-04-29

## 2019-04-29 RX ORDER — BISACODYL 10 MG
SUPPOSITORY, RECTAL RECTAL
Qty: 12 SUPPOSITORY | Refills: 1 | Status: SHIPPED | OUTPATIENT
Start: 2019-04-29

## 2019-04-29 RX ORDER — HYDROMORPHONE HYDROCHLORIDE 1 MG/ML
4-6 SOLUTION ORAL
Qty: 30 ML | Refills: 0 | Status: SHIPPED | DISCHARGE
Start: 2019-04-29 | End: 2019-04-29

## 2019-04-29 RX ORDER — LORAZEPAM 0.5 MG/1
0.25 TABLET ORAL EVERY 4 HOURS PRN
Qty: 30 TABLET | Refills: 1 | Status: SHIPPED | OUTPATIENT
Start: 2019-04-29 | End: 2019-04-29

## 2019-04-29 RX ORDER — LORAZEPAM 0.5 MG/1
0.25 TABLET ORAL EVERY 4 HOURS PRN
Qty: 30 TABLET | Refills: 1 | Status: SHIPPED | OUTPATIENT
Start: 2019-04-29

## 2019-04-29 RX ORDER — HALOPERIDOL 0.5 MG/1
0.5 TABLET ORAL EVERY 6 HOURS PRN
Qty: 30 TABLET | Refills: 1 | Status: SHIPPED | OUTPATIENT
Start: 2019-04-29

## 2019-04-29 RX ORDER — SENNOSIDES 8.6 MG
1 TABLET ORAL 2 TIMES DAILY PRN
Qty: 100 TABLET | Refills: 1 | Status: SHIPPED | OUTPATIENT
Start: 2019-04-29

## 2019-04-29 RX ORDER — ATROPINE SULFATE 10 MG/ML
2-4 SOLUTION/ DROPS OPHTHALMIC
Qty: 5 ML | Refills: 1 | Status: SHIPPED | OUTPATIENT
Start: 2019-04-29 | End: 2019-04-29

## 2019-04-29 RX ORDER — ATROPINE SULFATE 10 MG/ML
2-4 SOLUTION/ DROPS OPHTHALMIC
Qty: 5 ML | Refills: 1 | Status: SHIPPED | OUTPATIENT
Start: 2019-04-29

## 2019-04-29 RX ORDER — ACETAMINOPHEN 650 MG/1
650 SUPPOSITORY RECTAL EVERY 4 HOURS PRN
Qty: 12 SUPPOSITORY | Refills: 1 | Status: SHIPPED | OUTPATIENT
Start: 2019-04-29 | End: 2019-04-29

## 2019-04-29 RX ADMIN — HYDROMORPHONE HYDROCHLORIDE 4 MG: 10 INJECTION INTRAMUSCULAR; INTRAVENOUS; SUBCUTANEOUS at 02:12

## 2019-04-29 ASSESSMENT — ACTIVITIES OF DAILY LIVING (ADL)
ADLS_ACUITY_SCORE: 20

## 2019-04-29 NOTE — DISCHARGE SUMMARY
[facility]  Discharge Summary        Belgica Klein MRN# [id number]   YOB: 1928 Age: 89 year old     Date of Admission: 4/17/2019  Date of Discharge: 4/30/2019  Admitting Physician: Chalino Zhang MD  Discharge Physician: Sim Eddy MD     Primary Provider: No Ref-Primary, Physician  Primary Care Physician Phone Number: None         Discharge Diagnoses:   1. MSSA bacteremia.   2. Septic left total hip arthroplasty - status post I&D 4/18/2019.  3. Suspected infective endocarditis.  4. Septic shock due to above.  5. Numerous acute/subacute brain infarcts, suspect cardioembolic and/or septic related to above.  6. Acute encephalopathy, suspect infectious.  7. New onset A. fib with RVR.  8. Acute hypoxic respiratory failure due to acute pulmonary edema.  9. Acute renal failure stage II, suspect prerenal.  10. Dysphagia.   11. Hyponatremia.  12. Hypernatremia due to decreased PO/dehydration.         Other Chronic Medical Problems:      1. Benign essential hypertension.    2. Hyperlipidemia.         Allergies:         Allergies   Allergen Reactions     Penicillins Hives           Discharge Medications:        Current Discharge Medication List      START taking these medications    Details   acetaminophen (TYLENOL) 650 MG suppository Place 1 suppository (650 mg) rectally every 4 hours as needed for fever or mild pain (Do not exceed 4000 mg total acetaminophen per day.) Unwrap prior to insertion.  Qty: 12 suppository, Refills: 1    Associated Diagnoses: Acute CVA (cerebrovascular accident) (H)      atropine 1 % ophthalmic solution Take 2-4 drops by mouth, place under tongue or place inside cheek every 2 hours as needed for other (terminal respiratory secretions) Not for ophthalmic use.  Qty: 5 mL, Refills: 1    Associated Diagnoses: Acute CVA (cerebrovascular accident) (H)      bisacodyl (DULCOLAX) 10 MG suppository Unwrap and insert 1 suppository rectally daily as needed for  constipation.  Qty: 12 suppository, Refills: 1    Associated Diagnoses: Acute CVA (cerebrovascular accident) (H)      haloperidol (HALDOL) 0.5 MG tablet Take 1 tablet (0.5 mg) by mouth, place under tongue or insert rectally every 6 hours as needed for agitation (nausea)  Qty: 30 tablet, Refills: 1    Associated Diagnoses: Acute CVA (cerebrovascular accident) (H)      HYDROmorphone, HIGH CONC, (DILAUDID) 10 mg/mL LIQD oral Take 0.4-0.6 mLs (4-6 mg) by mouth or place under tongue every 2 hours as needed for moderate to severe pain (and/or  shortness of breath)  Qty: 30 mL, Refills: 0    Associated Diagnoses: Acute CVA (cerebrovascular accident) (H)      LORazepam (ATIVAN) 0.5 MG tablet Take 0.5 tablets (0.25 mg) by mouth, place under tongue or insert rectally every 4 hours as needed for anxiety (restlessness)  Qty: 30 tablet, Refills: 1    Associated Diagnoses: Acute CVA (cerebrovascular accident) (H)      !! MEDICATION INSTRUCTION If care facility cannot accept or use ranges, facility is instructed to use lower end of dosing range    Associated Diagnoses: Acute CVA (cerebrovascular accident) (H)      !! MEDICATION INSTRUCTION If care facility cannot accept or use ranges, facility is instructed to use lower end of dosing range    Associated Diagnoses: Acute CVA (cerebrovascular accident) (H)      sennosides (SENOKOT) 8.6 MG tablet Take 1 tablet by mouth 2 times daily as needed for constipation  Qty: 100 tablet, Refills: 1    Associated Diagnoses: Acute CVA (cerebrovascular accident) (H)       !! - Potential duplicate medications found. Please discuss with provider.      STOP taking these medications       amLODIPine (NORVASC) 5 MG tablet Comments:   Reason for Stopping:         aspirin 81 MG EC tablet Comments:   Reason for Stopping:         atorvastatin (LIPITOR) 10 MG tablet Comments:   Reason for Stopping:         hydrochlorothiazide (HYDRODIURIL) 12.5 MG tablet Comments:   Reason for Stopping:         losartan  (COZAAR) 100 MG tablet Comments:   Reason for Stopping:                   Discharge Instructions and Follow-Up:      Follow-up Appointments     Follow Up and recommended labs and tests      Follow-up with Hospice providers.                   Consultations This Hospital Stay:      1. Neurology.  2. Orthopedic Surgery.  3. Infectious Disease.  4. Intensivist.  5. Palliative Care.  6. Nephrology.        Admission History:      Please see the H&P by Dr. Li on 4/17/2019 for complete details. Briefly, Mrs. Belgica Klein is a 89 yo female with history including HTN, HLD, TIA and recurrent UTI's, who presented 4/27 with AMS with recent fevers and left hip pain and found with evidence of acute CVI.         Problem Oriented Hospital Course:      1. MSSA bacteremia.   2. Septic left total hip arthroplasty - status post I&D 4/18/2019.  3. Suspected infective endocarditis.  4. Septic shock due to above.  Pt initially presented 4/17 with AMS with fevers with recent left hip pain. BC's on admit quickly positive for gram positive cocci and started on broad antibiotics. Seen by Ortho and left hip aspirated 4/17 and showed purulent fluid with 240K WBC's. TTE 4/17 did not reveal vegetations. Taken to OR 4/18 for I&D of septic left hip. ID consulted. Cultures returned MSSA and antibiotics narrowed to cefazolin 4/18. High concerns for infective endocarditis given positive blood cultures, septic cerebral emboli and [place]ay lesion; LUTHER offered but pt declined. Antibiotics changed to nafcillin 4/21. Given worsened encephalopathy, transitioned to comfort cares 4/25.      5. Numerous acute/subacute brain infarcts, suspect cardioembolic and/or septic related to above.  6. Acute encephalopathy, suspect infectious.  Pt presented 4/17 with expressive aphasia, some confusion and possibly left visual field cut. Head CT 4/17 showed 2-3 cm area of hypoattenuation and gray-white differentiation loss involving the mid right postcentral gyrus  extending into the right parietal white matter. Echo 4/17 with preserved EF, negative Bubble. MRI of the brain 4/18 showed numerous scattered infarcts involving the cerebral and cerebellar hemispheres bilaterally and basal ganglia bilaterally consistent with embolic infarcts from a central embolic source. Started on ASA. Infection managed as above. Seen by Neurology and felt that anticoagulation would be high risk for hemorrhagic conversion due to septic emboli. Worsened mentation 4/24; CT ordered but ultimately declined by family due to focus on comfort.     7. New onset A. fib with RVR.  Management included amiodarone gtt with conversion to NSR. Transitioned to PO amiodarone. Decided against AC as above given concern for hemorrhagic transformation.     8. Acute hypoxic respiratory failure due to acute pulmonary edema.  Was intubated postop, successfully extubated 4/19. Respiratory status worse 4/20 despite diuretics, chest x-ray consistent with pulmonary edema, gradually improved and taken off diuretics.     9. Acute renal failure stage II, suspect prerenal.  FENa less than 1%. Nephrology consulted.     10. Dysphagia.   Seen by Speech and required DD1 diet with honey thickened liquids.     11. Hyponatremia.  Sodium at presentation was 129.  The patient reportedly has had problems with low sodium in the recent past.  She was on hydrochlorothiazide and reportedly was stopped just prior to admission. Hyponatremia resolved and later developed hypernatremia.     12. Hypernatremia due to decreased PO/dehydration.  Pt initially hyponatremic as above and later became hypernatremic.     Overall, given pt's declining status and after further discussion by family with Hospitalist and Palliative Care, it was decided to transition to comfort cares on 4/25.         Code Status:      Comfort Care        Pending Results:        Unresulted Labs Ordered in the Past 30 Days of this Admission     Date and Time Order Name Status  Description    4/18/2019 1543 Anaerobic bacterial culture Preliminary                 Discharge Disposition:      Discharged to hospice.        Discharge Time:      Less than 30 minutes.        Key Imaging Studies, Lab Findings and Procedures/Surgeries:        Results for orders placed or performed during the hospital encounter of 04/17/19   CT Head w/o Contrast    Narrative    CT SCAN OF THE HEAD WITHOUT CONTRAST   4/17/2019 8:35 AM     HISTORY: Ataxia, stroke suspected. Code stroke.    TECHNIQUE:  Axial images of the head and coronal reformations without  IV contrast material. Radiation dose for this scan was reduced using  automated exposure control, adjustment of the mA and/or kV according  to patient size, or iterative reconstruction technique.    COMPARISON: None.    FINDINGS:  Mild volume loss is present. A 2 to 3 cm area of gray-white  differentiation loss is present involving the right mid postcentral  gyrus (series 3 image 24). This is in continuity with hypoattenuation  extending into the deep right parietal white matter. Background of  patchy white matter hypoattenuation is present likely reflecting  chronic small vessel ischemic change. No evidence of acute hemorrhage,  mass effect, or hydrocephalus. The visualized calvarium, skull base,  paranasal sinuses, and extracranial soft tissues are unremarkable.  Bilateral lens replacements are present.      Impression    IMPRESSION:  A 2-3 cm area of hypoattenuation and gray-white  differentiation loss involving the mid right postcentral gyrus  extending into the right parietal white matter. This is suspicious for  area of acute/subacute ischemia. MRI could be performed. No evidence  of hemorrhage.    Findings were discussed by phone between Dr. Pérez and Dr. Addison at  8:40 AM on 4/17/2019.    BELINDA PÉREZ MD   CTA Head Neck with Contrast     Value    Radiologist flags Incidental thyroid nodule and aneurysm (Urgent)    Narrative    CT ANGIOGRAM OF THE  HEAD AND NECK WITH CONTRAST  4/17/2019 8:37 AM     HISTORY: Ataxia, stroke suspected. Code stroke.    TECHNIQUE:  CT angiography with an injection of 70 mL [id number]-370 IV  with scans through the head and neck. Images were transferred to a  separate 3-D workstation where multiplanar reformations and 3-D images  were created. Estimates of carotid stenoses are made relative to the  distal internal carotid artery diameters except as noted. Radiation  dose for this scan was reduced using automated exposure control,  adjustment of the mA and/or kV according to patient size, or iterative  reconstruction technique.    COMPARISON: None.     CT ANGIOGRAM HEAD FINDINGS:  The vertebral arteries, basilar artery,  internal carotid arteries, anterior cerebral arteries, and middle  cerebral arteries are patent. Scattered vascular calcifications are  present. There is fetal origin of the right posterior cerebral artery.  Patent left posterior communicating artery is present. Patent anterior  communicating artery is present. No evidence of large vessel  occlusion. A broad-based 4 mm saccular aneurysm is present off the  distal left middle cerebral artery M1 segment. The aneurysm projects  posteriorly (series 9 image 164). Dural venous sinuses are without  appreciable abnormality.     CT ANGIOGRAM NECK FINDINGS:  A three-vessel aortic arch is present.  The bilateral common carotid, internal carotid, external carotid, and  vertebral arteries are patent. There is severe atherosclerotic  plaquing at the bilateral carotid bifurcations. There is approximately  30% narrowing of the proximal right internal carotid artery and  approximately 50% narrowing of the proximal left internal carotid  artery.     An enhancing right thyroid nodule is present measuring up to 3.9 cm.  There is a retropharyngeal course of the right internal carotid  artery. Degenerative changes are present throughout the spine. There  is fusion across multiple bilateral  cervical spine facet joints.      Impression    IMPRESSION:  CTA HEAD:  1. Patent intracranial circulation.  2. 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    CTA NECK:  1. Severe atherosclerotic plaquing at the bilateral carotid  bifurcations with approximately 20-30% narrowing of the proximal right  internal carotid artery and approximately 40-50% narrowing of the  proximal left internal carotid artery.  2. Enhancing right thyroid nodule measuring up to 3.9 cm. Recommend  correlation with ultrasound-guided fine-needle aspiration.    [Access Center: Incidental thyroid nodule and aneurysm]    This report will be copied to the [facility] to ensure a  provider acknowledges the finding. Access Center is available Monday  through Friday 8am-3:30 pm.     BELINDA DAVIS MD   CT Abdomen Pelvis w/o Contrast    Narrative    CT ABDOMEN AND PELVIS WITHOUT CONTRAST  4/17/2019 8:51 AM    HISTORY: Abdominal distention and abdominal pain.    COMPARISON: None.    TECHNIQUE: Routine transverse CT imaging of the abdomen and pelvis was  performed without contrast. Radiation dose for this scan was reduced  using automated exposure control, adjustment of the mA and/or kV  according to patient size, or iterative reconstruction technique.    FINDINGS: There is a 0.3 cm noncalcified nodule in the posterior  aspect of the left lung base. There is mild atelectasis elsewhere in  both lung bases. There is also a small left pleural effusion. The  liver, spleen, pancreas, gallbladder, and adrenal glands are normal.  There is a 7.0 cm simple-appearing cyst of the right kidney. No other  renal abnormality is identified. The bladder is obscured by artifact  from bilateral hip arthroplasties. However, no abnormality is seen  within the limited visualized portion of the bladder. No enlarged  lymph node or  other abnormal mass is demonstrated. No free fluid is  seen. No free intraperitoneal gas is identified. The gastrointestinal  tract is unremarkable. The appendix is not identified. There is no  additional evidence of appendicitis. There is calcification in the  vascular structures. There are degenerative changes of the spine. No  other osseous abnormality is seen. There appears to be atrophy of the  anterior abdominal wall musculature with a slight outpouching of the  peritoneal lining adjacent to the musculature. However, no focal  hernia is seen. No other abdominal or pelvic wall pathology is  demonstrated.       Impression    IMPRESSION:   1. No acute abnormality is seen. Specifically, no gastrointestinal  tract pathology is noted.   2. There is a 0.3 cm noncalcified nodule in the posterior left lung  base. Recommendations for one or multiple incidental lung nodules <  6mm :    Low risk patients: No routine follow-up.    High risk patients: Optional follow-up CT at 12 months; if  unchanged, no further follow-up.    *Low Risk: Minimal or absent history of smoking or other known risk  factors.  *Nonsolid (ground glass) or partly solid nodules may require longer  follow-up to exclude indolent adenocarcinoma.  *Recommendations based on Guidelines for the Management of Incidental  Pulmonary Nodules Detected at CT: From the Fleischner Society 2017,  Radiology 2017.    NAZARIO LEAVITT MD   XR Chest 2 Views    Narrative    CHEST TWO VIEWS  4/17/2019 9:59 AM    HISTORY: Cough.    COMPARISON: None.      Impression    IMPRESSION: The heart size is normal. There is a small left pleural  effusion. No right effusion or pneumothorax is seen. There are  surgical changes of the right shoulder with advanced degenerative  changes of the left shoulder. There is moderate diffuse vascular  prominence suggesting vascular congestion. There may be mild  atelectasis in the left lung base. The lungs are otherwise clear. No  other  abnormality is seen.     NAZARIO LEAVITT MD   XR Knee Right 3 Views    Narrative    RIGHT KNEE THREE VIEWS   4/17/2019 9:59 AM    HISTORY: Right knee pain.    COMPARISON: None.    FINDINGS: No fracture or acute abnormality is demonstrated. There is  moderate medial and lateral compartment joint space loss with mild  chondrocalcinosis. There are similar or slightly less extensive  degenerative changes of the patellofemoral joint. No other abnormality  is seen.      Impression    IMPRESSION: Degenerative changes as described above.     NAZARIO LEAVITT MD   Echo    4/17/2019:    Interpretation Summary     1. The left ventricle is normal in structure, function and size. The visual  ejection fraction is estimated at 55%.  2. The right ventricle is normal in structure, function and size.  3. There is mild to moderate mitral stenosis. Mean 8mmHg.  4. There is mild (1+) tricuspid regurgitation. The right ventricular systolic  pressure is approximated at 42mmHg plus the right atrial pressure.  5. Dilation of the inferior vena cava is present with abnormal respiratory  variation in diameter.  6. There is no atrial shunt seen. A contrast injection (Bubble Study) was  performed that was negative for flow across the interatrial septum.     No changes from outside echo report from 12/2018.    MRI Brain w & w/o contrast    Narrative    MRI OF THE BRAIN WITHOUT AND WITH CONTRAST 4/18/2019 12:15 PM     COMPARISON: Head CT 4/17/2019.    HISTORY: Focal neuro deficit, more than 6 hours, stroke suspected.     TECHNIQUE: Axial diffusion-weighted with ADC map, axial T2-weighted  with fat saturation, axial T1-weighted, axial turboFLAIR and coronal  T1-weighted images of the brain were acquired without intravenous  contrast.  Following intravenous administration of gadolinium (7 mL  Gadavist), axial T1-weighted images of the brain were acquired.     FINDINGS: There are numerous tiny and small ischemic infarcts  scattered throughout  the cerebral and cerebellar hemispheres  bilaterally including moderate-sized cortical infarcts at the  frontoparietal junction on the right and posterior occipital lobe on  the left. There are also infarcts involving the right thalamus and  left caudate head. Given these infarcts are scattered throughout  multiple vascular distributions, embolic infarcts from a central  embolic source should be considered.    There is moderate diffuse cerebral volume loss. There are numerous  scattered focal and patchy periventricular areas of abnormal T2 signal  hyperintensity in the cerebral white matter bilaterally that are  consistent with sequela of chronic small vessel ischemic disease. Most  of the recent ischemic infarcts also demonstrate abnormal T2 signal  hyperintensity suggesting they are greater than 6 hours old.    The ventricles and basal cisterns are within normal limits in  configuration given the degree of cerebral volume loss. There is no  midline shift. There are no extra-axial fluid collections. There is no  evidence for acute intracranial hemorrhage. There is no abnormal  contrast enhancement in the brain or its coverings.    There is no sinusitis or mastoiditis.      Impression    IMPRESSION:  1. Numerous scattered infarcts involving the cerebral and cerebellar  hemispheres bilaterally and basal ganglia bilaterally consistent with  embolic infarcts from a central embolic source.  2. Diffuse cerebral volume loss and cerebral white matter changes  consistent with chronic small vessel ischemic disease.    BARON ZEPEDA MD   XR Joint Aspiration Major Left    Narrative    EXAM: XR JOINT ASPIRATION MAJOR LEFT  4/18/2019 11:32 AM       History:  Left hip pain - left total hip arthroplasty - fever - rule  out septic hip.     PROCEDURE: The risks (including bleeding, infection, and allergy to  contrast and medications) and benefits of the procedure were explained  to the patient and consent was obtained from the  patient's daughter  due to the patient's mental status.  Using sterile technique and  fluoroscopic guidance, a #20 gauge needle was placed into the Left hip  joint using an anterior approach.  2 mL of pink-tinged white thick  cloudy fluid was aspirated.  No initial complication. Fluid sent to  lab for analysis.      Fluoroscopy time: 0.1 minutes  Number of Images: 1  Medications used: 3 mL Lidocaine 1% intradermally       Impression    IMPRESSION:  Technically successful left hip aspiration.     LISY HYDE PA-C   XR Pelvis w Hip Left 1 View    Narrative    PELVIS AND HIP LEFT ONE VIEW 4/17/2019 3:42 PM     HISTORY: Evaluate left total hip prosthesis, painful.    COMPARISON: None.      Impression    IMPRESSION: Bilateral total hip arthroplasties. No acute bony  abnormality or radiographic evidence for loosening with respect to the  left hip arthroplasty. The distal tip of the femoral component is not  included on the right side, but the remainder of the right  arthroplasty appears normal. Soft tissue calcifications adjacent to  the left ischial tuberosity are most likely secondary to chronic  tendinosis of the left hamstring origins. Some contrast material is  noted in the urinary bladder from CT abdomen and pelvis done one day  earlier.    DAHIANA PALOMO MD     SURGERY     4/18/2019:  Direct anterior approach irrigation and debridement left septic total hip arthroplasty        XR Chest Port 1 View    Narrative    CHEST ONE VIEW UPRIGHT 4/18/2019 5:45 PM     HISTORY: ET tube and central line placement.    COMPARISON: April 17, 2019      Impression    IMPRESSION: Endotracheal tube tip approximately 7 cm above the humza,  consider advancement. Probable left upper lobe infiltrate. Right IJ  line noted tip in the mid SVC. Retrocardiac infiltrate as well.    ANA PAULA ALANIS MD   XR Chest Port 1 View    Narrative    CHEST ONE VIEW PORTABLE   4/18/2019 8:16 PM     HISTORY: Endotracheal tube placement.    COMPARISON:  4/18/2019 at 1745 hours      Impression    IMPRESSION: Endotracheal tube has been advanced slightly and is in  good position. Central venous catheter is unchanged. Cardiomegaly is  unchanged. New infiltrate at the right lung base probably atelectasis.  No change in left lower lobe infiltrate. Decreased left upper lobe  atelectasis.    BELINDA OJEDA MD   CT Head w/o Contrast    Narrative    CT OF THE HEAD WITHOUT CONTRAST April 20, 2019 12:56 PM     HISTORY: Stroke.     TECHNIQUE: Axial CT images of the head from the skull base to the  vertex were acquired without IV contrast. Radiation dose for this scan  was reduced using automated exposure control, adjustment of the mA  and/or kV according to patient size, or iterative reconstruction  technique.    COMPARISON: Head MRI 4/18/2019.    FINDINGS:   INTRACRANIAL CONTENTS: No intracranial hemorrhage or abnormal fluid  collection. Evolving acute infarcts scattered throughout the supra and  infratentorial parenchyma. They are best visualized in the right  frontoparietal cortex, medial left occipital lobe, left caudate, right  thalamus and right cerebellum. No gross mass effect associated with  these infarcts. No CT evidence of a new abnormality compared to the  prior MRI. Superimposed mild presumed chronic small vessel ischemic  change and mild generalized volume loss.    VISUALIZED ORBITS/SINUSES/MASTOIDS: No significant orbital  abnormality. No significant paranasal sinus mucosal disease. No  significant middle ear or mastoid effusion.    OSSEOUS STRUCTURES/SOFT TISSUES: No significant abnormality.      Impression    IMPRESSION:  1.  No change compared to the prior head MRI.  2.  Overall moderate burden of acute infarcts in the supra and  infratentorial parenchyma. The largest infarcts are in the right  frontoparietal parenchyma in the medial left occipital lobe.  3.  No gross mass effect. No hemorrhagic transformation.    JOZEF GOLDSTEIN MD   XR Chest Port 1 View     Narrative    XR PORTABLE CHEST ONE VIEW 4/20/2019 8:35 PM     COMPARISON: Frontal chest x-ray 4/18/2019.    HISTORY: Dyspnea.      Impression    IMPRESSION: Endotracheal tube has been removed. Right IJ catheter  again noted.    Moderate cardiomegaly persists. Obscuration of the left hemidiaphragm  again noted possibly representing atelectasis, pneumonia, pleural  effusion or combination of these. There is interstitial prominence in  both lungs likely representing at least mild pulmonary edema. There is  no pneumothorax. There is no definite pleural effusion on the right.    BARON ZEPEDA MD   XR Chest Port 1 View    Narrative    XR CHEST PORT 1 VW  4/24/2019 6:05 AM     HISTORY:  Sepsis, Infective endocarditis.    COMPARISON: Film performed on 4/20/2019    FINDINGS:  A right jugular venous catheter is noted. The tip is  projected over the superior vena cava. The heart is enlarged.  Bilateral pleural effusions with associated basilar infiltrate or  atelectasis is again noted. The pleural effusions have increased when  compared to the film of 4/20/2019. Right shoulder arthroplasty is  again noted. Severe degenerative change left shoulder joint.      Impression    IMPRESSION: Increase in the size of the bilateral pleural effusions an  associated basilar infiltrate or atelectasis.    ILEANA SOFIA MD   US Renal Complete    Narrative    ULTRASOUND RENAL COMPLETE 4/24/2019 10:14 AM    HISTORY:  89-year-old woman with acute renal failure.    COMPARISON: None. Patient had a CT examination on April 17, 2019.    FINDINGS: The right kidney is 8.3 x 4.9 x 5 cm with AP cortical  thickness of 1.6 cm. The left kidney is 12.2 x 5.7 x 5.6 cm with AP  cortical thickness of 1.8 cm. No hydronephrosis. A large simple cyst  is identified arising from the inferior aspect of the right kidney  measuring 6.7 x 6.3 x 7.1 cm.      Impression    IMPRESSION:  1. No hydronephrosis of either kidney.  2. Seemingly smaller right kidney than left,  though with review of CT  exam, suspect kidneys are more similar in size than measurements  suggest given rotation of right kidney, perhaps related to a large 7.1  cm cyst.    MAURICIO SUBRAMANIAN MD

## 2019-04-29 NOTE — PLAN OF CARE
AZUCENA orientation, pt somnolent during NOC, x2 doses SL dilaudid given for pain when repositioning. Per family request pt repositioned every 4 hours, family completed oral cares. VS and assessment deferred d/t comfort cares. Aguilar patent and secured. Pt appeared comfortable during NOC.

## 2019-04-29 NOTE — PROGRESS NOTES
Writer met with family to sign consents for pt to have hospice services upon discharge to [facility] on 4/30.  No equipment needs. Discussed comfort meds with Dr. Eddy, appreciate changing haldol/ativan to tabs per [facility] preference, and dosing of hydromorphone changed to current orders. Pt appears comfortable.  RAE [facility] arranged transport for 11am, and hospice team will meet pt/family at [facility] tomorrow at 1pm.  Please send comfort meds with pt upon discharge.  POLST completed and signed by MD, left on pt chart to be sent with pt upon discharge.  Thank you for this consult.  Please contact [facility] with any changes to the discharge plans.    [phone]

## 2019-04-29 NOTE — PROGRESS NOTES
Chart reviewed  Pt now on comfort cares  Diet: DD1, HTL  No nutrition intervention at this time  Will provide po intake as pt desires/tolerates    Conchita Crooks RD, LD  Clinical Dietitian - [facility]  Pager - [phone]

## 2019-04-29 NOTE — PROGRESS NOTES
RAE  I: RAE spoke with Sue from [facility] and she stated she can accept patient tomorrow. SW will discuss with family. SW arranged a stretcher transport for 1100 on 4/30 due to patient being somnolent and d/cing on hospice. Family is aware that patient may receive a bill for transport. Meds will be filled at [facility] and sent with patient. Orders will be faxed in the morning on day of discharge.     P: SW will continue to follow and assist as needed.    Ciara Blandon, MOISÉS   *[phone]

## 2019-04-30 PROCEDURE — 25000132 ZZH RX MED GY IP 250 OP 250 PS 637: Performed by: INTERNAL MEDICINE

## 2019-04-30 PROCEDURE — 99238 HOSP IP/OBS DSCHRG MGMT 30/<: CPT | Performed by: INTERNAL MEDICINE

## 2019-04-30 PROCEDURE — 25000132 ZZH RX MED GY IP 250 OP 250 PS 637: Performed by: NURSE PRACTITIONER

## 2019-04-30 RX ORDER — HYDROMORPHONE HYDROCHLORIDE 10 MG/ML
1 INJECTION INTRAMUSCULAR; INTRAVENOUS; SUBCUTANEOUS
Status: COMPLETED | OUTPATIENT
Start: 2019-04-30 | End: 2019-04-30

## 2019-04-30 RX ADMIN — HYDROMORPHONE HYDROCHLORIDE 1 MG: 10 INJECTION INTRAMUSCULAR; INTRAVENOUS; SUBCUTANEOUS at 10:10

## 2019-04-30 RX ADMIN — HYDROMORPHONE HYDROCHLORIDE 2 MG: 10 INJECTION INTRAMUSCULAR; INTRAVENOUS; SUBCUTANEOUS at 11:17

## 2019-04-30 ASSESSMENT — ACTIVITIES OF DAILY LIVING (ADL)
ADLS_ACUITY_SCORE: 20

## 2019-04-30 NOTE — PLAN OF CARE
AZUCENA orientation, pt somnolent, responds to pain. Comfort cares maintained. Appeared comfortable overnight, no indicators of pain. Aguilar patent. Repositioned q4h per family request. Plan for transfer to [facility] at 11am today. Daughter at bedside, supportive.

## 2019-04-30 NOTE — PLAN OF CARE
Pt on comfort cares- responding only to pain (grimacing, moaning) with repositioning but appears to be resting comfortably.  Extremities warm, breathing unlabored.  Family refuses pain meds at this time-requests repo Q4.  Family states L shoulder, neck are most painful for her, hot pack given for neck. Stretcher transport to [facility] arranged for 1100 tomorrow.

## 2019-04-30 NOTE — PROGRESS NOTES
[facility]    Internal Medicine Hospitalist Progress Note  04/30/2019  I evaluated patient on the above date.    Sim Eddy Jr., MD  [phone] (p)  Text Page        Assessment & Plan New actions/orders today (04/30/2019) are underlined.    Mrs. Belgica Klein is a 89 yo female with history including HTN, HLD, TIA and recurrent UTI's, who presented 4/27 with AMS with recent fevers and left hip pain and found with evidence of acute CVI.      ASSESSMENT:  1. MSSA bacteremia.   2. Septic left total hip arthroplasty - status post I&D 4/18/2019.  3. Suspected infective endocarditis.  4. Septic shock due to above.  Pt initially presented 4/17 with AMS with fevers with recent left hip pain. BC's on admit quickly positive for gram positive cocci and started on broad antibiotics. Seen by Ortho and left hip aspirated 4/17 and showed purulent fluid with 240K WBC's. TTE 4/17 did not reveal vegetations. Taken to OR 4/18 for I&D of septic left hip. ID consulted. Cultures returned MSSA and antibiotics narrowed to cefazolin 4/18. High concerns for infective endocarditis given positive blood cultures, septic cerebral emboli and [place]ay lesion; LUTHER offered but pt declined. Antibiotics changed to nafcillin 4/21. Given worsened encephalopathy, transitioned to comfort cares 4/25.     5. Numerous acute/subacute brain infarcts, suspect cardioembolic and/or septic related to above.  6. Acute encephalopathy, suspect infectious.  Pt presented 4/17 with expressive aphasia, some confusion and possibly left visual field cut. Head CT 4/17 showed 2-3 cm area of hypoattenuation and gray-white differentiation loss involving the mid right postcentral gyrus extending into the right parietal white matter. Echo 4/17 with preserved EF, negative Bubble. MRI of the brain 4/18 showed numerous scattered infarcts involving the cerebral and cerebellar hemispheres bilaterally and basal ganglia bilaterally consistent with embolic infarcts from a  central embolic source. Started on ASA. Infection managed as above. Seen by Neurology and felt that anticoagulation would be high risk for hemorrhagic conversion due to septic emboli. Worsened mentation 4/24; CT ordered but ultimately declined by family due to focus on comfort.     7. New onset A. fib with RVR.  Management included amiodarone gtt with conversion to NSR. Transitioned to PO amiodarone. Decided against AC as above given concern for hemorrhagic transformation.     8. Acute hypoxic respiratory failure due to acute pulmonary edema.  Was intubated postop, successfully extubated 4/19. Respiratory status worse 4/20 despite diuretics, chest x-ray consistent with pulmonary edema, gradually improved and taken off diuretics.    9. Acute renal failure stage II, suspect prerenal.  FENa less than 1%. Nephrology was consulted.     10. Dysphagia.   Seen by Speech and required DD1 diet with honey thickened liquids.     11. Hyponatremia.  Sodium at presentation was 129.  The patient reportedly has had problems with low sodium in the recent past.  She was on hydrochlorothiazide and reportedly was stopped just prior to admission. Hyponatremia resolved and later developed hypernatremia.    12. Hypernatremia due to decreased PO/dehydration.  Pt initially hyponatremic as above and later became hypernatremic.    Overall, given pt's declining status and after further discussion by family with Hospitalist and Palliative Care, it was decided to transition to comfort cares on 4/25.     OTHER CHRONIC ISSUES:  1. H/o benign essential hypertension.    Prior to admission on amlodipine and losartan. Issues with low BP's/shock thi stay and BP meds held.  2. Hyperlipidemia.       PLAN:  - Continue comfort cares.  - Discharge to [facility] today 4/30.        Diet: Regular Diet Adult  Advance Diet as Tolerated    Prophylaxis: PCD's.  Aguilar Catheter: in place, indication: End of Life  Code Status: DNR/DNI      Disposition Plan   Expected  "discharge: when hospice facility bed available.  Entered: Sim Eddy MD 04/30/2019, 7:50 AM       Communication.  - I d/w pt's daughters 4/29.    Interval History   No acute events.  I d/w pt's daughter.    -Data reviewed today: I reviewed all new labs and imaging over the last 24 hours. I personally reviewed no images or EKG's today.    Physical Exam   Heart Rate: 83, Blood pressure 142/61, pulse 91, temperature 97.7  F (36.5  C), temperature source Axillary, resp. rate 22, height 1.727 m (5' 8\"), weight 83.9 kg (185 lb), SpO2 98 %.  Vitals:    04/21/19 0400 04/22/19 0400 04/24/19 0500   Weight: 77.1 kg (169 lb 15.6 oz) 77.3 kg (170 lb 6.7 oz) 83.9 kg (185 lb)     Vital Signs with Ranges     No data found.  I/O's Last 24 hours  I/O last 3 completed shifts:  In: -   Out: 1900 [Urine:1900]    Constitutional: Eyes closed, appears comfortable.  Respiratory:   Cardiovascular:   GI:   Skin/Integumen:   Other:        Data   Recent Labs   Lab 04/25/19  0758 04/24/19  0645 04/23/19  1515   WBC 18.4* 20.8*  --    HGB 9.2* 9.3*  --    MCV 85 85  --     158  --    * 149* 147*   POTASSIUM 3.7 3.9 3.9   CHLORIDE 118* 115* 116*   CO2 19* 22 22   BUN 92* 83* 71*   CR 2.50* 2.19* 1.80*   ANIONGAP 12 12 9   LULU 7.6* 7.3* 7.8*   * 143* 194*   ALBUMIN 1.7* 1.3*  --      Recent Labs   Lab Test 04/25/19  0758 04/25/19  0750 04/24/19  2243 04/24/19  0645 04/24/19  0157 04/23/19  2121 04/23/19  1659 04/23/19  1515  04/23/19  0725  04/22/19  0400   *  --   --  143*  --   --   --  194*  --  120*  --  133*   BGM  --  115* 111*  --  131* 172* 204*  --    < >  --    < >  --     < > = values in this interval not displayed.         No results found for this or any previous visit (from the past 24 hour(s)).    Medications   All medications were reviewed.    - MEDICATION INSTRUCTIONS -         amiodarone  400 mg Oral Daily       "

## 2019-05-02 ENCOUNTER — DOCUMENTATION ONLY (OUTPATIENT)
Dept: OTHER | Facility: CLINIC | Age: 84
End: 2019-05-02

## 2019-05-02 LAB
BACTERIA SPEC CULT: NORMAL
Lab: NORMAL
SPECIMEN SOURCE: NORMAL

## 2019-07-18 ENCOUNTER — TELEPHONE (OUTPATIENT)
Dept: MEDSURG UNIT | Facility: CLINIC | Age: 84
End: 2019-07-18

## 2020-03-11 ENCOUNTER — HEALTH MAINTENANCE LETTER (OUTPATIENT)
Age: 85
End: 2020-03-11

## 2021-01-03 ENCOUNTER — HEALTH MAINTENANCE LETTER (OUTPATIENT)
Age: 86
End: 2021-01-03

## 2021-04-25 ENCOUNTER — HEALTH MAINTENANCE LETTER (OUTPATIENT)
Age: 86
End: 2021-04-25

## 2021-10-10 ENCOUNTER — HEALTH MAINTENANCE LETTER (OUTPATIENT)
Age: 86
End: 2021-10-10

## 2022-05-21 ENCOUNTER — HEALTH MAINTENANCE LETTER (OUTPATIENT)
Age: 87
End: 2022-05-21

## 2022-05-22 NOTE — OR NURSING
1345-BP low and atrial fib with uncontrolled rate of 140-159 - then goes down to 120 - MARSHA called   1355--MDA's here    1403--Esmolol 30 mg IV given per Dr Wilder  1405-Esmolol 20 mg IV given per Dr Wilder   1415 - Hospitalist called - waiting for return call   Dr Zhang here and discussing with MARSHA Chandra    Both MD's talking with family   
Esmolol 20 mg IV Esmolol given per Dr Simons -- to put in arterial line preop   
Hospitalist did call and talk with MDA   I also spoke to family with update -  
Yes

## 2022-09-18 ENCOUNTER — HEALTH MAINTENANCE LETTER (OUTPATIENT)
Age: 87
End: 2022-09-18

## 2023-06-04 ENCOUNTER — HEALTH MAINTENANCE LETTER (OUTPATIENT)
Age: 88
End: 2023-06-04

## (undated) DEVICE — SU PDS II 2-0 CT-1 27" Z339H

## (undated) DEVICE — PREP CHLORAPREP 26ML TINTED ORANGE  260815

## (undated) DEVICE — DRAIN ROUND W/RESERV KIT JACKSON PRATT 10FR 400ML SU130-402D

## (undated) DEVICE — GLOVE PROTEXIS BLUE W/NEU-THERA 7.0  2D73EB70

## (undated) DEVICE — SOL BENZOIN 0.5OZ

## (undated) DEVICE — MANIFOLD NEPTUNE 4 PORT 700-20

## (undated) DEVICE — GOWN IMPERVIOUS SPECIALTY XL/XLONG 39049

## (undated) DEVICE — SU ETHIBOND 5 V-37 4X30" MB66G

## (undated) DEVICE — GLOVE PROTEXIS POWDER FREE 8.0 ORTHOPEDIC 2D73ET80

## (undated) DEVICE — PACK TOTAL HIP W/POUCH SOP15HPFSM

## (undated) DEVICE — DRAPE CONVERTORS U-DRAPE 60X72" 8476

## (undated) DEVICE — HOOD FLYTE 0408-800-000

## (undated) DEVICE — GLOVE PROTEXIS W/NEU-THERA 6.5  2D73TE65

## (undated) DEVICE — LINEN TOWEL PACK X5 5464

## (undated) DEVICE — SOL WATER IRRIG 1000ML BOTTLE 2F7114

## (undated) DEVICE — SUCTION IRR SYSTEM W/O TIP INTERPULSE HANDPIECE 0210-100-000

## (undated) DEVICE — SU PDS II 1 CT MONOFIL Z353H

## (undated) DEVICE — GLOVE PROTEXIS BLUE W/NEU-THERA 8.0  2D73EB80

## (undated) DEVICE — SU VICRYL 2-0 CT-1 27" UND J259H

## (undated) DEVICE — PAD FOAM MCGUIRE KIT 0814-0150

## (undated) DEVICE — ESU GROUND PAD UNIVERSAL W/O CORD

## (undated) DEVICE — SOL NACL 0.9% IRRIG 1000ML BOTTLE 2F7124

## (undated) DEVICE — SU VICRYL 0 CT-1 36" J346H

## (undated) DEVICE — DRSG AQUACEL AG 3.5X9.75" HYDROFIBER 412011

## (undated) DEVICE — ESU PENCIL W/SMOKE EVAC NEPTUNE STRYKER 0703-046-000

## (undated) DEVICE — BONE CLEANING TIP INTERPULSE FEMORAL CANAL 0210-008-000

## (undated) DEVICE — SU MONOCRYL 3-0 PS-2 27" Y427H

## (undated) DEVICE — SPONGE LAP 18X18" X8435

## (undated) RX ORDER — LIDOCAINE HYDROCHLORIDE 20 MG/ML
INJECTION, SOLUTION EPIDURAL; INFILTRATION; INTRACAUDAL; PERINEURAL
Status: DISPENSED
Start: 2019-04-18

## (undated) RX ORDER — LIDOCAINE HYDROCHLORIDE 10 MG/ML
INJECTION, SOLUTION EPIDURAL; INFILTRATION; INTRACAUDAL; PERINEURAL
Status: DISPENSED
Start: 2019-04-18

## (undated) RX ORDER — LIDOCAINE HYDROCHLORIDE 10 MG/ML
INJECTION, SOLUTION EPIDURAL; INFILTRATION; INTRACAUDAL; PERINEURAL
Status: DISPENSED
Start: 2019-04-17

## (undated) RX ORDER — ALBUMIN, HUMAN INJ 5% 5 %
SOLUTION INTRAVENOUS
Status: DISPENSED
Start: 2019-04-18

## (undated) RX ORDER — FENTANYL CITRATE 50 UG/ML
INJECTION, SOLUTION INTRAMUSCULAR; INTRAVENOUS
Status: DISPENSED
Start: 2019-04-18

## (undated) RX ORDER — CLINDAMYCIN PHOSPHATE 900 MG/50ML
INJECTION, SOLUTION INTRAVENOUS
Status: DISPENSED
Start: 2019-04-18

## (undated) RX ORDER — VANCOMYCIN HYDROCHLORIDE 1 G/20ML
INJECTION, POWDER, LYOPHILIZED, FOR SOLUTION INTRAVENOUS
Status: DISPENSED
Start: 2019-04-18